# Patient Record
Sex: FEMALE | Race: WHITE | NOT HISPANIC OR LATINO | Employment: OTHER | URBAN - METROPOLITAN AREA
[De-identification: names, ages, dates, MRNs, and addresses within clinical notes are randomized per-mention and may not be internally consistent; named-entity substitution may affect disease eponyms.]

---

## 2017-01-04 ENCOUNTER — OFFICE VISIT - HEALTHEAST (OUTPATIENT)
Dept: INTERNAL MEDICINE | Facility: CLINIC | Age: 62
End: 2017-01-04

## 2017-01-04 DIAGNOSIS — J01.90 ACUTE SINUS INFECTION: ICD-10-CM

## 2017-01-04 ASSESSMENT — MIFFLIN-ST. JEOR: SCORE: 1388.23

## 2017-01-09 ENCOUNTER — COMMUNICATION - HEALTHEAST (OUTPATIENT)
Dept: INTERNAL MEDICINE | Facility: CLINIC | Age: 62
End: 2017-01-09

## 2017-01-16 ENCOUNTER — COMMUNICATION - HEALTHEAST (OUTPATIENT)
Dept: INTERNAL MEDICINE | Facility: CLINIC | Age: 62
End: 2017-01-16

## 2017-01-29 ENCOUNTER — RECORDS - HEALTHEAST (OUTPATIENT)
Dept: ADMINISTRATIVE | Facility: OTHER | Age: 62
End: 2017-01-29

## 2017-02-07 ENCOUNTER — COMMUNICATION - HEALTHEAST (OUTPATIENT)
Dept: SCHEDULING | Facility: CLINIC | Age: 62
End: 2017-02-07

## 2017-02-16 ENCOUNTER — OFFICE VISIT - HEALTHEAST (OUTPATIENT)
Dept: INTERNAL MEDICINE | Facility: CLINIC | Age: 62
End: 2017-02-16

## 2017-02-16 DIAGNOSIS — Z01.818 PRE-OP EXAM: ICD-10-CM

## 2017-02-16 DIAGNOSIS — N81.6 RECTOCELE: ICD-10-CM

## 2017-02-16 DIAGNOSIS — I10 ESSENTIAL HYPERTENSION: ICD-10-CM

## 2017-02-16 DIAGNOSIS — G47.33 OSA (OBSTRUCTIVE SLEEP APNEA): ICD-10-CM

## 2017-02-16 ASSESSMENT — MIFFLIN-ST. JEOR: SCORE: 1383.69

## 2017-02-17 ASSESSMENT — MIFFLIN-ST. JEOR: SCORE: 1379.16

## 2017-02-20 ENCOUNTER — ANESTHESIA - HEALTHEAST (OUTPATIENT)
Dept: SURGERY | Facility: CLINIC | Age: 62
End: 2017-02-20

## 2017-02-21 ENCOUNTER — SURGERY - HEALTHEAST (OUTPATIENT)
Dept: SURGERY | Facility: CLINIC | Age: 62
End: 2017-02-21

## 2017-02-22 ASSESSMENT — MIFFLIN-ST. JEOR: SCORE: 1405.92

## 2017-03-08 ENCOUNTER — COMMUNICATION - HEALTHEAST (OUTPATIENT)
Dept: INTERNAL MEDICINE | Facility: CLINIC | Age: 62
End: 2017-03-08

## 2017-09-24 ENCOUNTER — COMMUNICATION - HEALTHEAST (OUTPATIENT)
Dept: INTERNAL MEDICINE | Facility: CLINIC | Age: 62
End: 2017-09-24

## 2017-10-26 ENCOUNTER — OFFICE VISIT - HEALTHEAST (OUTPATIENT)
Dept: INTERNAL MEDICINE | Facility: CLINIC | Age: 62
End: 2017-10-26

## 2017-10-26 DIAGNOSIS — R50.9 FEVER AND CHILLS: ICD-10-CM

## 2017-10-26 DIAGNOSIS — H92.02 LEFT EAR PAIN: ICD-10-CM

## 2017-10-26 DIAGNOSIS — M54.2 NECK PAIN ON LEFT SIDE: ICD-10-CM

## 2017-10-26 ASSESSMENT — MIFFLIN-ST. JEOR: SCORE: 1342.87

## 2017-12-13 ENCOUNTER — COMMUNICATION - HEALTHEAST (OUTPATIENT)
Dept: INTERNAL MEDICINE | Facility: CLINIC | Age: 62
End: 2017-12-13

## 2018-01-18 ENCOUNTER — COMMUNICATION - HEALTHEAST (OUTPATIENT)
Dept: INTERNAL MEDICINE | Facility: CLINIC | Age: 63
End: 2018-01-18

## 2018-01-23 ENCOUNTER — RECORDS - HEALTHEAST (OUTPATIENT)
Dept: ADMINISTRATIVE | Facility: OTHER | Age: 63
End: 2018-01-23

## 2018-02-07 ENCOUNTER — COMMUNICATION - HEALTHEAST (OUTPATIENT)
Dept: INTERNAL MEDICINE | Facility: CLINIC | Age: 63
End: 2018-02-07

## 2018-02-08 ENCOUNTER — OFFICE VISIT - HEALTHEAST (OUTPATIENT)
Dept: INTERNAL MEDICINE | Facility: CLINIC | Age: 63
End: 2018-02-08

## 2018-02-08 DIAGNOSIS — Z11.59 ENCOUNTER FOR HEPATITIS C SCREENING TEST FOR LOW RISK PATIENT: ICD-10-CM

## 2018-02-08 DIAGNOSIS — Z00.00 ROUTINE GENERAL MEDICAL EXAMINATION AT A HEALTH CARE FACILITY: ICD-10-CM

## 2018-02-08 DIAGNOSIS — R05.9 COUGH WITH FEVER: ICD-10-CM

## 2018-02-08 DIAGNOSIS — M19.90 OSTEOARTHRITIS: ICD-10-CM

## 2018-02-08 DIAGNOSIS — I10 ESSENTIAL HYPERTENSION: ICD-10-CM

## 2018-02-08 DIAGNOSIS — R50.9 COUGH WITH FEVER: ICD-10-CM

## 2018-02-08 DIAGNOSIS — E03.9 HYPOTHYROIDISM: ICD-10-CM

## 2018-02-08 DIAGNOSIS — E78.5 HYPERLIPIDEMIA: ICD-10-CM

## 2018-02-08 DIAGNOSIS — S83.207A ACUTE TORN MENISCUS OF KNEE, LEFT, INITIAL ENCOUNTER: ICD-10-CM

## 2018-02-08 LAB
ALBUMIN SERPL-MCNC: 3.6 G/DL (ref 3.5–5)
ALBUMIN UR-MCNC: NEGATIVE MG/DL
ALP SERPL-CCNC: 109 U/L (ref 45–120)
ALT SERPL W P-5'-P-CCNC: 18 U/L (ref 0–45)
ANION GAP SERPL CALCULATED.3IONS-SCNC: 9 MMOL/L (ref 5–18)
APPEARANCE UR: CLEAR
AST SERPL W P-5'-P-CCNC: 18 U/L (ref 0–40)
BACTERIA #/AREA URNS HPF: ABNORMAL HPF
BILIRUB DIRECT SERPL-MCNC: 0.3 MG/DL
BILIRUB SERPL-MCNC: 0.8 MG/DL (ref 0–1)
BILIRUB UR QL STRIP: NEGATIVE
BUN SERPL-MCNC: 18 MG/DL (ref 8–22)
CALCIUM SERPL-MCNC: 9.7 MG/DL (ref 8.5–10.5)
CHLORIDE BLD-SCNC: 107 MMOL/L (ref 98–107)
CHOLEST SERPL-MCNC: 170 MG/DL
CO2 SERPL-SCNC: 25 MMOL/L (ref 22–31)
COLOR UR AUTO: YELLOW
CREAT SERPL-MCNC: 0.91 MG/DL (ref 0.6–1.1)
FASTING STATUS PATIENT QL REPORTED: YES
FLUAV AG SPEC QL IA: NORMAL
FLUBV AG SPEC QL IA: NORMAL
GFR SERPL CREATININE-BSD FRML MDRD: >60 ML/MIN/1.73M2
GLUCOSE BLD-MCNC: 102 MG/DL (ref 70–125)
GLUCOSE UR STRIP-MCNC: NEGATIVE MG/DL
HDLC SERPL-MCNC: 58 MG/DL
HGB BLD-MCNC: 13.5 G/DL (ref 12–16)
HGB UR QL STRIP: ABNORMAL
KETONES UR STRIP-MCNC: NEGATIVE MG/DL
LDLC SERPL CALC-MCNC: 89 MG/DL
LEUKOCYTE ESTERASE UR QL STRIP: NEGATIVE
NITRATE UR QL: NEGATIVE
PH UR STRIP: 6.5 [PH] (ref 5–8)
POTASSIUM BLD-SCNC: 4.6 MMOL/L (ref 3.5–5)
PROT SERPL-MCNC: 7.2 G/DL (ref 6–8)
RBC #/AREA URNS AUTO: ABNORMAL HPF
SODIUM SERPL-SCNC: 141 MMOL/L (ref 136–145)
SP GR UR STRIP: 1.02 (ref 1–1.03)
SQUAMOUS #/AREA URNS AUTO: ABNORMAL LPF
TRIGL SERPL-MCNC: 113 MG/DL
TSH SERPL DL<=0.005 MIU/L-ACNC: 0.21 UIU/ML (ref 0.3–5)
UROBILINOGEN UR STRIP-ACNC: ABNORMAL
WBC #/AREA URNS AUTO: ABNORMAL HPF

## 2018-02-08 ASSESSMENT — MIFFLIN-ST. JEOR: SCORE: 1347.4

## 2018-02-09 LAB — HCV AB SERPL QL IA: NEGATIVE

## 2018-02-13 ENCOUNTER — COMMUNICATION - HEALTHEAST (OUTPATIENT)
Dept: INTERNAL MEDICINE | Facility: CLINIC | Age: 63
End: 2018-02-13

## 2018-04-02 ENCOUNTER — HOSPITAL ENCOUNTER (OUTPATIENT)
Dept: MAMMOGRAPHY | Facility: CLINIC | Age: 63
Discharge: HOME | End: 2018-04-02
Attending: INTERNAL MEDICINE

## 2018-04-02 DIAGNOSIS — Z12.31 VISIT FOR SCREENING MAMMOGRAM: ICD-10-CM

## 2018-04-04 ENCOUNTER — COMMUNICATION - HEALTHEAST (OUTPATIENT)
Dept: INTERNAL MEDICINE | Facility: CLINIC | Age: 63
End: 2018-04-04

## 2018-04-04 DIAGNOSIS — I10 ESSENTIAL HYPERTENSION: ICD-10-CM

## 2018-10-04 ENCOUNTER — COMMUNICATION - HEALTHEAST (OUTPATIENT)
Dept: INTERNAL MEDICINE | Facility: CLINIC | Age: 63
End: 2018-10-04

## 2018-10-04 DIAGNOSIS — E03.9 HYPOTHYROIDISM: ICD-10-CM

## 2018-12-17 ENCOUNTER — RECORDS - HEALTHEAST (OUTPATIENT)
Dept: ADMINISTRATIVE | Facility: OTHER | Age: 63
End: 2018-12-17

## 2019-01-05 ENCOUNTER — COMMUNICATION - HEALTHEAST (OUTPATIENT)
Dept: INTERNAL MEDICINE | Facility: CLINIC | Age: 64
End: 2019-01-05

## 2019-01-05 DIAGNOSIS — E03.9 HYPOTHYROIDISM: ICD-10-CM

## 2019-01-06 ENCOUNTER — COMMUNICATION - HEALTHEAST (OUTPATIENT)
Dept: INTERNAL MEDICINE | Facility: CLINIC | Age: 64
End: 2019-01-06

## 2019-01-30 ENCOUNTER — COMMUNICATION - HEALTHEAST (OUTPATIENT)
Dept: INTERNAL MEDICINE | Facility: CLINIC | Age: 64
End: 2019-01-30

## 2019-01-30 DIAGNOSIS — E78.5 HYPERLIPIDEMIA LDL GOAL <100: ICD-10-CM

## 2019-03-01 ENCOUNTER — RECORDS - HEALTHEAST (OUTPATIENT)
Dept: ADMINISTRATIVE | Facility: OTHER | Age: 64
End: 2019-03-01

## 2019-04-03 ENCOUNTER — COMMUNICATION - HEALTHEAST (OUTPATIENT)
Dept: INTERNAL MEDICINE | Facility: CLINIC | Age: 64
End: 2019-04-03

## 2019-04-03 DIAGNOSIS — E03.9 HYPOTHYROIDISM: ICD-10-CM

## 2019-04-07 ENCOUNTER — COMMUNICATION - HEALTHEAST (OUTPATIENT)
Dept: INTERNAL MEDICINE | Facility: CLINIC | Age: 64
End: 2019-04-07

## 2019-04-07 DIAGNOSIS — I10 ESSENTIAL HYPERTENSION: ICD-10-CM

## 2019-04-08 ENCOUNTER — COMMUNICATION - HEALTHEAST (OUTPATIENT)
Dept: INTERNAL MEDICINE | Facility: CLINIC | Age: 64
End: 2019-04-08

## 2019-04-08 DIAGNOSIS — F32.A DEPRESSION, UNSPECIFIED DEPRESSION TYPE: ICD-10-CM

## 2019-05-08 ENCOUNTER — COMMUNICATION - HEALTHEAST (OUTPATIENT)
Dept: INTERNAL MEDICINE | Facility: CLINIC | Age: 64
End: 2019-05-08

## 2019-05-08 DIAGNOSIS — F32.A DEPRESSION, UNSPECIFIED DEPRESSION TYPE: ICD-10-CM

## 2019-05-14 ENCOUNTER — OFFICE VISIT - HEALTHEAST (OUTPATIENT)
Dept: INTERNAL MEDICINE | Facility: CLINIC | Age: 64
End: 2019-05-14

## 2019-05-14 DIAGNOSIS — Z01.818 ENCOUNTER FOR PREOPERATIVE EXAMINATION FOR GENERAL SURGICAL PROCEDURE: ICD-10-CM

## 2019-05-14 DIAGNOSIS — S83.207S ACUTE TORN MENISCUS OF KNEE, LEFT, SEQUELA: ICD-10-CM

## 2019-05-14 DIAGNOSIS — I10 ESSENTIAL HYPERTENSION: ICD-10-CM

## 2019-05-14 DIAGNOSIS — Z51.81 MEDICATION MONITORING ENCOUNTER: ICD-10-CM

## 2019-05-14 DIAGNOSIS — E03.9 HYPOTHYROIDISM, UNSPECIFIED TYPE: ICD-10-CM

## 2019-05-14 DIAGNOSIS — E78.5 HYPERLIPIDEMIA, UNSPECIFIED HYPERLIPIDEMIA TYPE: ICD-10-CM

## 2019-05-14 DIAGNOSIS — G47.33 OSA (OBSTRUCTIVE SLEEP APNEA): ICD-10-CM

## 2019-05-14 LAB
ALBUMIN SERPL-MCNC: 4 G/DL (ref 3.5–5)
ALP SERPL-CCNC: 112 U/L (ref 45–120)
ALT SERPL W P-5'-P-CCNC: 19 U/L (ref 0–45)
ANION GAP SERPL CALCULATED.3IONS-SCNC: 12 MMOL/L (ref 5–18)
AST SERPL W P-5'-P-CCNC: 18 U/L (ref 0–40)
ATRIAL RATE - MUSE: 66 BPM
BILIRUB DIRECT SERPL-MCNC: 0.2 MG/DL
BILIRUB SERPL-MCNC: 0.5 MG/DL (ref 0–1)
BUN SERPL-MCNC: 17 MG/DL (ref 8–22)
CALCIUM SERPL-MCNC: 10.3 MG/DL (ref 8.5–10.5)
CHLORIDE BLD-SCNC: 104 MMOL/L (ref 98–107)
CO2 SERPL-SCNC: 24 MMOL/L (ref 22–31)
CREAT SERPL-MCNC: 1.1 MG/DL (ref 0.6–1.1)
DIASTOLIC BLOOD PRESSURE - MUSE: NORMAL MMHG
ERYTHROCYTE [DISTWIDTH] IN BLOOD BY AUTOMATED COUNT: 11.5 % (ref 11–14.5)
GFR SERPL CREATININE-BSD FRML MDRD: 50 ML/MIN/1.73M2
GLUCOSE BLD-MCNC: 90 MG/DL (ref 70–125)
HCT VFR BLD AUTO: 43.1 % (ref 35–47)
HGB BLD-MCNC: 14.4 G/DL (ref 12–16)
INTERPRETATION ECG - MUSE: NORMAL
MCH RBC QN AUTO: 30.1 PG (ref 27–34)
MCHC RBC AUTO-ENTMCNC: 33.5 G/DL (ref 32–36)
MCV RBC AUTO: 90 FL (ref 80–100)
P AXIS - MUSE: 18 DEGREES
PLATELET # BLD AUTO: 276 THOU/UL (ref 140–440)
PMV BLD AUTO: 8.1 FL (ref 7–10)
POTASSIUM BLD-SCNC: 4.5 MMOL/L (ref 3.5–5)
PR INTERVAL - MUSE: 156 MS
PROT SERPL-MCNC: 7.1 G/DL (ref 6–8)
QRS DURATION - MUSE: 74 MS
QT - MUSE: 404 MS
QTC - MUSE: 423 MS
R AXIS - MUSE: -2 DEGREES
RBC # BLD AUTO: 4.79 MILL/UL (ref 3.8–5.4)
SODIUM SERPL-SCNC: 140 MMOL/L (ref 136–145)
SYSTOLIC BLOOD PRESSURE - MUSE: NORMAL MMHG
T AXIS - MUSE: 39 DEGREES
TSH SERPL DL<=0.005 MIU/L-ACNC: 0.24 UIU/ML (ref 0.3–5)
VENTRICULAR RATE- MUSE: 66 BPM
WBC: 7.3 THOU/UL (ref 4–11)

## 2019-05-14 ASSESSMENT — MIFFLIN-ST. JEOR: SCORE: 1410.91

## 2019-05-22 ENCOUNTER — COMMUNICATION - HEALTHEAST (OUTPATIENT)
Dept: INTERNAL MEDICINE | Facility: CLINIC | Age: 64
End: 2019-05-22

## 2019-05-22 DIAGNOSIS — E78.5 HYPERLIPIDEMIA LDL GOAL <100: ICD-10-CM

## 2019-07-07 ENCOUNTER — COMMUNICATION - HEALTHEAST (OUTPATIENT)
Dept: INTERNAL MEDICINE | Facility: CLINIC | Age: 64
End: 2019-07-07

## 2019-07-07 DIAGNOSIS — E03.9 HYPOTHYROIDISM: ICD-10-CM

## 2019-08-12 ENCOUNTER — COMMUNICATION - HEALTHEAST (OUTPATIENT)
Dept: INTERNAL MEDICINE | Facility: CLINIC | Age: 64
End: 2019-08-12

## 2019-08-12 DIAGNOSIS — F32.A DEPRESSION, UNSPECIFIED DEPRESSION TYPE: ICD-10-CM

## 2019-08-27 ENCOUNTER — OFFICE VISIT - HEALTHEAST (OUTPATIENT)
Dept: INTERNAL MEDICINE | Facility: CLINIC | Age: 64
End: 2019-08-27

## 2019-08-27 DIAGNOSIS — I10 ESSENTIAL HYPERTENSION: ICD-10-CM

## 2019-08-27 DIAGNOSIS — Z51.81 MEDICATION MONITORING ENCOUNTER: ICD-10-CM

## 2019-08-27 DIAGNOSIS — E03.9 HYPOTHYROIDISM, UNSPECIFIED TYPE: ICD-10-CM

## 2019-08-27 DIAGNOSIS — Z86.0100 HISTORY OF COLON POLYPS: ICD-10-CM

## 2019-08-27 DIAGNOSIS — Z00.00 ROUTINE GENERAL MEDICAL EXAMINATION AT A HEALTH CARE FACILITY: ICD-10-CM

## 2019-08-27 DIAGNOSIS — M15.0 PRIMARY OSTEOARTHRITIS INVOLVING MULTIPLE JOINTS: ICD-10-CM

## 2019-08-27 DIAGNOSIS — Z11.4 SCREENING FOR HIV (HUMAN IMMUNODEFICIENCY VIRUS): ICD-10-CM

## 2019-08-27 DIAGNOSIS — G47.33 OSA (OBSTRUCTIVE SLEEP APNEA): ICD-10-CM

## 2019-08-27 DIAGNOSIS — E78.5 HYPERLIPIDEMIA, UNSPECIFIED HYPERLIPIDEMIA TYPE: ICD-10-CM

## 2019-08-27 DIAGNOSIS — R13.19 ESOPHAGEAL DYSPHAGIA: ICD-10-CM

## 2019-08-27 LAB
ALBUMIN SERPL-MCNC: 3.9 G/DL (ref 3.5–5)
ALBUMIN UR-MCNC: NEGATIVE MG/DL
ALP SERPL-CCNC: 126 U/L (ref 45–120)
ALT SERPL W P-5'-P-CCNC: 20 U/L (ref 0–45)
ANION GAP SERPL CALCULATED.3IONS-SCNC: 12 MMOL/L (ref 5–18)
APPEARANCE UR: CLEAR
AST SERPL W P-5'-P-CCNC: 19 U/L (ref 0–40)
BILIRUB DIRECT SERPL-MCNC: 0.2 MG/DL
BILIRUB SERPL-MCNC: 0.5 MG/DL (ref 0–1)
BILIRUB UR QL STRIP: NEGATIVE
BUN SERPL-MCNC: 15 MG/DL (ref 8–22)
CALCIUM SERPL-MCNC: 9.5 MG/DL (ref 8.5–10.5)
CHLORIDE BLD-SCNC: 106 MMOL/L (ref 98–107)
CHOLEST SERPL-MCNC: 182 MG/DL
CO2 SERPL-SCNC: 22 MMOL/L (ref 22–31)
COLOR UR AUTO: YELLOW
CREAT SERPL-MCNC: 1.07 MG/DL (ref 0.6–1.1)
ERYTHROCYTE [DISTWIDTH] IN BLOOD BY AUTOMATED COUNT: 11.8 % (ref 11–14.5)
FASTING STATUS PATIENT QL REPORTED: YES
GFR SERPL CREATININE-BSD FRML MDRD: 52 ML/MIN/1.73M2
GLUCOSE BLD-MCNC: 91 MG/DL (ref 70–125)
GLUCOSE UR STRIP-MCNC: NEGATIVE MG/DL
HCT VFR BLD AUTO: 39.9 % (ref 35–47)
HDLC SERPL-MCNC: 57 MG/DL
HGB BLD-MCNC: 13.4 G/DL (ref 12–16)
HGB UR QL STRIP: NEGATIVE
HIV 1+2 AB+HIV1 P24 AG SERPL QL IA: NEGATIVE
KETONES UR STRIP-MCNC: NEGATIVE MG/DL
LDLC SERPL CALC-MCNC: 104 MG/DL
LEUKOCYTE ESTERASE UR QL STRIP: NEGATIVE
MCH RBC QN AUTO: 30.3 PG (ref 27–34)
MCHC RBC AUTO-ENTMCNC: 33.7 G/DL (ref 32–36)
MCV RBC AUTO: 90 FL (ref 80–100)
NITRATE UR QL: NEGATIVE
PH UR STRIP: 5.5 [PH] (ref 4.5–8)
PLATELET # BLD AUTO: 241 THOU/UL (ref 140–440)
PMV BLD AUTO: 8.3 FL (ref 7–10)
POTASSIUM BLD-SCNC: 4.3 MMOL/L (ref 3.5–5)
PROT SERPL-MCNC: 6.6 G/DL (ref 6–8)
RBC # BLD AUTO: 4.44 MILL/UL (ref 3.8–5.4)
SODIUM SERPL-SCNC: 140 MMOL/L (ref 136–145)
SP GR UR STRIP: 1.01 (ref 1–1.03)
TRIGL SERPL-MCNC: 107 MG/DL
TSH SERPL DL<=0.005 MIU/L-ACNC: 3.03 UIU/ML (ref 0.3–5)
UROBILINOGEN UR STRIP-ACNC: NORMAL
WBC: 5.3 THOU/UL (ref 4–11)

## 2019-08-27 ASSESSMENT — MIFFLIN-ST. JEOR: SCORE: 1429.05

## 2019-09-10 ENCOUNTER — RECORDS - HEALTHEAST (OUTPATIENT)
Dept: ADMINISTRATIVE | Facility: OTHER | Age: 64
End: 2019-09-10

## 2019-10-17 ENCOUNTER — RECORDS - HEALTHEAST (OUTPATIENT)
Dept: ADMINISTRATIVE | Facility: OTHER | Age: 64
End: 2019-10-17

## 2019-10-28 ENCOUNTER — COMMUNICATION - HEALTHEAST (OUTPATIENT)
Dept: INTERNAL MEDICINE | Facility: CLINIC | Age: 64
End: 2019-10-28

## 2019-10-29 ENCOUNTER — COMMUNICATION - HEALTHEAST (OUTPATIENT)
Dept: INTERNAL MEDICINE | Facility: CLINIC | Age: 64
End: 2019-10-29

## 2019-10-29 ENCOUNTER — OFFICE VISIT - HEALTHEAST (OUTPATIENT)
Dept: INTERNAL MEDICINE | Facility: CLINIC | Age: 64
End: 2019-10-29

## 2019-10-29 DIAGNOSIS — R42 DIZZINESS: ICD-10-CM

## 2019-10-29 DIAGNOSIS — R68.89 FULLNESS IN HEAD: ICD-10-CM

## 2019-10-29 ASSESSMENT — MIFFLIN-ST. JEOR: SCORE: 1442.66

## 2019-11-01 ENCOUNTER — HOSPITAL ENCOUNTER (OUTPATIENT)
Dept: MRI IMAGING | Facility: CLINIC | Age: 64
Discharge: HOME | End: 2019-11-01

## 2019-11-01 DIAGNOSIS — R42 DIZZINESS: ICD-10-CM

## 2019-11-01 LAB
CREAT BLD-MCNC: 1 MG/DL (ref 0.6–1.1)
GFR SERPL CREATININE-BSD FRML MDRD: 56 ML/MIN/1.73M2

## 2019-11-25 ENCOUNTER — HOSPITAL ENCOUNTER (OUTPATIENT)
Dept: MAMMOGRAPHY | Facility: CLINIC | Age: 64
Discharge: HOME | End: 2019-11-25
Attending: OBSTETRICS & GYNECOLOGY

## 2019-11-25 DIAGNOSIS — Z12.31 VISIT FOR SCREENING MAMMOGRAM: ICD-10-CM

## 2020-01-06 ENCOUNTER — OFFICE VISIT - HEALTHEAST (OUTPATIENT)
Dept: INTERNAL MEDICINE | Facility: CLINIC | Age: 65
End: 2020-01-06

## 2020-01-06 DIAGNOSIS — I10 ESSENTIAL HYPERTENSION: ICD-10-CM

## 2020-01-06 DIAGNOSIS — M17.12 PRIMARY OSTEOARTHRITIS OF LEFT KNEE: ICD-10-CM

## 2020-01-06 DIAGNOSIS — E03.9 HYPOTHYROIDISM, UNSPECIFIED TYPE: ICD-10-CM

## 2020-01-06 DIAGNOSIS — Z01.818 ENCOUNTER FOR PREOPERATIVE EXAMINATION FOR GENERAL SURGICAL PROCEDURE: ICD-10-CM

## 2020-01-06 DIAGNOSIS — E78.5 HYPERLIPIDEMIA, UNSPECIFIED HYPERLIPIDEMIA TYPE: ICD-10-CM

## 2020-01-06 DIAGNOSIS — K21.00 GERD WITH ESOPHAGITIS: ICD-10-CM

## 2020-01-06 DIAGNOSIS — G47.33 OSA (OBSTRUCTIVE SLEEP APNEA): ICD-10-CM

## 2020-01-06 LAB
ALBUMIN UR-MCNC: NEGATIVE MG/DL
ANION GAP SERPL CALCULATED.3IONS-SCNC: 9 MMOL/L (ref 5–18)
APPEARANCE UR: CLEAR
ATRIAL RATE - MUSE: 66 BPM
BILIRUB UR QL STRIP: NEGATIVE
BUN SERPL-MCNC: 12 MG/DL (ref 8–22)
CALCIUM SERPL-MCNC: 9.6 MG/DL (ref 8.5–10.5)
CHLORIDE BLD-SCNC: 107 MMOL/L (ref 98–107)
CO2 SERPL-SCNC: 24 MMOL/L (ref 22–31)
COLOR UR AUTO: YELLOW
CREAT SERPL-MCNC: 0.97 MG/DL (ref 0.6–1.1)
DIASTOLIC BLOOD PRESSURE - MUSE: NORMAL
ERYTHROCYTE [DISTWIDTH] IN BLOOD BY AUTOMATED COUNT: 11.8 % (ref 11–14.5)
GFR SERPL CREATININE-BSD FRML MDRD: 58 ML/MIN/1.73M2
GLUCOSE BLD-MCNC: 103 MG/DL (ref 70–125)
GLUCOSE UR STRIP-MCNC: NEGATIVE MG/DL
HCT VFR BLD AUTO: 42 % (ref 35–47)
HGB BLD-MCNC: 14.2 G/DL (ref 12–16)
HGB UR QL STRIP: NEGATIVE
INTERPRETATION ECG - MUSE: NORMAL
KETONES UR STRIP-MCNC: NEGATIVE MG/DL
LEUKOCYTE ESTERASE UR QL STRIP: NEGATIVE
MCH RBC QN AUTO: 29.7 PG (ref 27–34)
MCHC RBC AUTO-ENTMCNC: 33.8 G/DL (ref 32–36)
MCV RBC AUTO: 88 FL (ref 80–100)
NITRATE UR QL: NEGATIVE
P AXIS - MUSE: 23 DEGREES
PH UR STRIP: 6 [PH] (ref 5–8)
PLATELET # BLD AUTO: 224 THOU/UL (ref 140–440)
PMV BLD AUTO: 8.3 FL (ref 7–10)
POTASSIUM BLD-SCNC: 4.6 MMOL/L (ref 3.5–5)
PR INTERVAL - MUSE: 158 MS
QRS DURATION - MUSE: 72 MS
QT - MUSE: 402 MS
QTC - MUSE: 421 MS
R AXIS - MUSE: -4 DEGREES
RBC # BLD AUTO: 4.79 MILL/UL (ref 3.8–5.4)
SODIUM SERPL-SCNC: 140 MMOL/L (ref 136–145)
SP GR UR STRIP: <=1.005 (ref 1–1.03)
SYSTOLIC BLOOD PRESSURE - MUSE: NORMAL
T AXIS - MUSE: 32 DEGREES
UROBILINOGEN UR STRIP-ACNC: NORMAL
VENTRICULAR RATE- MUSE: 66 BPM
WBC: 5.4 THOU/UL (ref 4–11)

## 2020-01-06 ASSESSMENT — PATIENT HEALTH QUESTIONNAIRE - PHQ9: SUM OF ALL RESPONSES TO PHQ QUESTIONS 1-9: 0

## 2020-01-06 ASSESSMENT — MIFFLIN-ST. JEOR: SCORE: 1438.12

## 2020-03-31 ENCOUNTER — COMMUNICATION - HEALTHEAST (OUTPATIENT)
Dept: INTERNAL MEDICINE | Facility: CLINIC | Age: 65
End: 2020-03-31

## 2020-03-31 DIAGNOSIS — F32.A DEPRESSION, UNSPECIFIED DEPRESSION TYPE: ICD-10-CM

## 2020-04-10 ENCOUNTER — COMMUNICATION - HEALTHEAST (OUTPATIENT)
Dept: INTERNAL MEDICINE | Facility: CLINIC | Age: 65
End: 2020-04-10

## 2020-04-10 DIAGNOSIS — I10 ESSENTIAL HYPERTENSION: ICD-10-CM

## 2020-05-29 ENCOUNTER — COMMUNICATION - HEALTHEAST (OUTPATIENT)
Dept: INTERNAL MEDICINE | Facility: CLINIC | Age: 65
End: 2020-05-29

## 2020-05-29 DIAGNOSIS — E78.5 HYPERLIPIDEMIA LDL GOAL <100: ICD-10-CM

## 2020-06-22 ENCOUNTER — COMMUNICATION - HEALTHEAST (OUTPATIENT)
Dept: INTERNAL MEDICINE | Facility: CLINIC | Age: 65
End: 2020-06-22

## 2020-06-22 DIAGNOSIS — F32.A DEPRESSION, UNSPECIFIED DEPRESSION TYPE: ICD-10-CM

## 2020-07-02 ENCOUNTER — COMMUNICATION - HEALTHEAST (OUTPATIENT)
Dept: INTERNAL MEDICINE | Facility: CLINIC | Age: 65
End: 2020-07-02

## 2020-07-02 DIAGNOSIS — E03.9 HYPOTHYROIDISM: ICD-10-CM

## 2020-08-11 ENCOUNTER — OFFICE VISIT - HEALTHEAST (OUTPATIENT)
Dept: INTERNAL MEDICINE | Facility: CLINIC | Age: 65
End: 2020-08-11

## 2020-08-11 DIAGNOSIS — R13.19 ESOPHAGEAL DYSPHAGIA: ICD-10-CM

## 2020-08-11 DIAGNOSIS — R19.7 DIARRHEA, UNSPECIFIED TYPE: ICD-10-CM

## 2020-08-11 DIAGNOSIS — R10.84 ABDOMINAL PAIN, GENERALIZED: ICD-10-CM

## 2020-08-11 LAB
ALBUMIN SERPL-MCNC: 3.8 G/DL (ref 3.5–5)
ALBUMIN UR-MCNC: NEGATIVE MG/DL
ALP SERPL-CCNC: 143 U/L (ref 45–120)
ALT SERPL W P-5'-P-CCNC: 18 U/L (ref 0–45)
ANION GAP SERPL CALCULATED.3IONS-SCNC: 12 MMOL/L (ref 5–18)
APPEARANCE UR: CLEAR
AST SERPL W P-5'-P-CCNC: 14 U/L (ref 0–40)
BACTERIA #/AREA URNS HPF: ABNORMAL HPF
BILIRUB SERPL-MCNC: 0.5 MG/DL (ref 0–1)
BILIRUB UR QL STRIP: NEGATIVE
BUN SERPL-MCNC: 21 MG/DL (ref 8–22)
CALCIUM SERPL-MCNC: 9.7 MG/DL (ref 8.5–10.5)
CHLORIDE BLD-SCNC: 105 MMOL/L (ref 98–107)
CO2 SERPL-SCNC: 24 MMOL/L (ref 22–31)
COLOR UR AUTO: YELLOW
CREAT SERPL-MCNC: 1.21 MG/DL (ref 0.6–1.1)
ERYTHROCYTE [DISTWIDTH] IN BLOOD BY AUTOMATED COUNT: 11.7 % (ref 11–14.5)
GFR SERPL CREATININE-BSD FRML MDRD: 45 ML/MIN/1.73M2
GLUCOSE BLD-MCNC: 93 MG/DL (ref 70–125)
GLUCOSE UR STRIP-MCNC: NEGATIVE MG/DL
HCT VFR BLD AUTO: 42.8 % (ref 35–47)
HGB BLD-MCNC: 14.2 G/DL (ref 12–16)
HGB UR QL STRIP: ABNORMAL
KETONES UR STRIP-MCNC: NEGATIVE MG/DL
LEUKOCYTE ESTERASE UR QL STRIP: NEGATIVE
MCH RBC QN AUTO: 29.7 PG (ref 27–34)
MCHC RBC AUTO-ENTMCNC: 33.1 G/DL (ref 32–36)
MCV RBC AUTO: 90 FL (ref 80–100)
NITRATE UR QL: NEGATIVE
PH UR STRIP: 5 [PH] (ref 5–8)
PLATELET # BLD AUTO: 234 THOU/UL (ref 140–440)
PMV BLD AUTO: 8.1 FL (ref 7–10)
POTASSIUM BLD-SCNC: 4.2 MMOL/L (ref 3.5–5)
PROT SERPL-MCNC: 7.3 G/DL (ref 6–8)
RBC # BLD AUTO: 4.77 MILL/UL (ref 3.8–5.4)
RBC #/AREA URNS AUTO: ABNORMAL HPF
SODIUM SERPL-SCNC: 141 MMOL/L (ref 136–145)
SP GR UR STRIP: 1.01 (ref 1–1.03)
SQUAMOUS #/AREA URNS AUTO: ABNORMAL LPF
TSH SERPL DL<=0.005 MIU/L-ACNC: 0.33 UIU/ML (ref 0.3–5)
UROBILINOGEN UR STRIP-ACNC: ABNORMAL
WBC #/AREA URNS AUTO: ABNORMAL HPF
WBC: 7.6 THOU/UL (ref 4–11)

## 2020-08-11 RX ORDER — OMEPRAZOLE 20 MG/1
20 TABLET, DELAYED RELEASE ORAL
Qty: 90 TABLET | Refills: 3 | Status: SHIPPED | OUTPATIENT
Start: 2020-08-11 | End: 2021-09-28

## 2020-08-11 ASSESSMENT — MIFFLIN-ST. JEOR: SCORE: 1401.84

## 2020-08-11 ASSESSMENT — PATIENT HEALTH QUESTIONNAIRE - PHQ9: SUM OF ALL RESPONSES TO PHQ QUESTIONS 1-9: 2

## 2020-08-12 ENCOUNTER — COMMUNICATION - HEALTHEAST (OUTPATIENT)
Dept: INTERNAL MEDICINE | Facility: CLINIC | Age: 65
End: 2020-08-12

## 2020-08-12 ENCOUNTER — COMMUNICATION - HEALTHEAST (OUTPATIENT)
Dept: LAB | Facility: CLINIC | Age: 65
End: 2020-08-12

## 2020-08-14 ENCOUNTER — AMBULATORY - HEALTHEAST (OUTPATIENT)
Dept: LAB | Facility: CLINIC | Age: 65
End: 2020-08-14

## 2020-08-14 DIAGNOSIS — R19.7 DIARRHEA, UNSPECIFIED TYPE: ICD-10-CM

## 2020-08-14 DIAGNOSIS — R10.84 ABDOMINAL PAIN, GENERALIZED: ICD-10-CM

## 2020-08-15 LAB
SHIGA TOXIN 1: NEGATIVE
SHIGA TOXIN 2: NEGATIVE

## 2020-08-17 LAB — BACTERIA SPEC CULT: NORMAL

## 2020-10-26 ENCOUNTER — VIRTUAL VISIT (OUTPATIENT)
Dept: FAMILY MEDICINE | Facility: OTHER | Age: 65
End: 2020-10-26

## 2020-10-26 NOTE — PROGRESS NOTES
"Date: 10/26/2020 11:07:32  Clinician: Adams Richard  Clinician NPI: [id number]  Patient: Kandi Kim  Patient : 1955  Patient Address: [address] [place] [address]  Patient Phone: [phone]  Visit Protocol: URI  Patient Summary:  Kandi is a 65 year old ( : 1955 ) female who initiated a [facility] Visit for COVID-19 (Coronavirus) evaluation and screening. When asked the question \"Please sign me up to receive news, health information and promotions from [facility].\", Kandi responded \"No\".    Kandi states her symptoms started 1-2 days ago.   Her symptoms consist of ear pain, a headache, a cough, nasal congestion, rhinitis, myalgia, malaise, a sore throat, and diarrhea.   Symptom details     Nasal secretions: The color of her mucus is clear.    Cough: Kandi coughs a few times an hour and her cough is not more bothersome at night. Phlegm does not come into her throat when she coughs. She believes her cough is caused by post-nasal drip.     Sore throat: Kandi reports having mild throat pain (1-3 on a 10 point pain scale), does not have exudate on her tonsils, and can swallow liquids. She is not sure if the lymph nodes in her neck are enlarged. A rash has not appeared on the skin since the sore throat started.     Headache: She states the headache is mild (1-3 on a 10 point pain scale).      Kandi denies having wheezing, fever, anosmia, vomiting, nausea, facial pain or pressure, chills, teeth pain, and ageusia. She also denies taking antibiotic medication in the past month and having recent facial or sinus surgery in the past 60 days. She is not experiencing dyspnea.   Precipitating events  Within the past week, Kandi has not been exposed to someone with strep throat. She has not recently been exposed to someone with influenza. Kandi has not been in close contact with any high risk individuals.   Pertinent COVID-19 (Coronavirus) information  In the past 14 days, Kandi has not worked in a " congregate living setting.   She does not work or volunteer as healthcare worker or a  and does not work or volunteer in a healthcare facility.   Kandi also has not lived in a congregate living setting in the past 14 days. She does not live with a healthcare worker.   Kandi has not had a close contact with a laboratory-confirmed COVID-19 patient within 14 days of symptom onset.   Since December 2019, Kandi and has not had upper respiratory infection or influenza-like illness. Has not been diagnosed with lab-confirmed COVID-19 test   Pertinent medical history  Kandi does not get yeast infections when she takes antibiotics.   Kandi does not need a return to work/school note.   Weight: 185 lbs   Kandi does not smoke or use smokeless tobacco.   Weight: 185 lbs    MEDICATIONS: omeprazole oral, levothyroxine oral, atorvastatin oral, citalopram oral, lisinopril oral, ALLERGIES: NKDA  Clinician Response:  Dear Kandi,   Your symptoms show that you may have coronavirus (COVID-19). This illness can cause fever, cough and trouble breathing. Many people get a mild case and get better on their own. Some people can get very sick.  What should I do?  We would like to test you for this virus.   1. Please call [phone] to schedule your visit. Explain that [facility]. Be ready to share your [facility] visit ID number.  Please note that if you are assessed for Covid-19 testing and receive an order for testing from [facility], that the scheduling of your Covid test at [facility] may be delayed by three or four days or more due to limited availability for testing. Additional options for testing can be found on the [place] Covid-19 Response website. https://[place].gov/covid19/    The following will serve as your written order for this COVID Test, ordered by me, for the indication of suspected COVID [Z20.828]: The test will be ordered in [employer] our electronic health record, after you are  "scheduled. It will show as ordered and authorized by Donald Dixon MD.  Order: COVID-19 (Coronavirus) PCR for [facility]MPTOMATIC [facility].   2. When it's time for your COVID test:  Stay at least 6 feet away from others. (If someone will drive you to your test, stay in the backseat, as far away from the  as you can.)   Cover your mouth and nose with a mask, tissue or washcloth.  Go straight to the testing site. Don't make any stops on the way there or back.      3.Starting now: Stay home and away from others (self-isolate) until:   You've had no fever---and no medicine that reduces fever---for one full day (24 hours). And...   Your other symptoms have gotten better. For example, your cough or breathing has improved. And...   At least 10 days have passed since your symptoms started.       During this time, don't leave the house except for testing or medical care.   Stay in your own room, even for meals. Use your own bathroom if you can.   Stay away from others in your home. No hugging, kissing or shaking hands. No visitors.  Don't go to work, school or anywhere else.    Clean \"high touch\" surfaces often (doorknobs, counters, handles, etc.). Use a household cleaning spray or wipes. You'll find a full list of  on the EPA website: www.epa.gov/pesticide-registration/list-n-disinfectants-use-against-sars-cov-2.   Cover your mouth and nose with a mask, tissue or washcloth to avoid spreading germs.  Wash your hands and face often. Use soap and water.  Caregivers in these groups are at risk for severe illness due to COVID-19:  o People 65 years and older  o People who live in a nursing home or long-term care facility  o People with chronic disease (lung, heart, cancer, diabetes, kidney, liver, immunologic)  o People who have a weakened immune system, including those who:   Are in cancer treatment  Take medicine that weakens the immune system, such as corticosteroids  Had a bone marrow or organ transplant  Have " an immune deficiency  Have poorly controlled HIV or AIDS  Are obese (body mass index of 40 or higher)  Smoke regularly   o Caregivers should wear gloves while washing dishes, handling laundry and cleaning bedrooms and bathrooms.  o Use caution when washing and drying laundry: Don't shake dirty laundry, and use the warmest water setting that you can.  o For more tips, go to www.cdc.gov/coronavirus/2019-ncov/downloads/10Things.pdf.    How can I take care of myself?    Get lots of rest. Drink extra fluids (unless a doctor has told you not to).   Take Tylenol (acetaminophen) for fever or pain. If you have liver or kidney problems, ask your family doctor if it's okay to take Tylenol.   Adults can take either:    650 mg (two 325 mg pills) every 4 to 6 hours, or...   1,000 mg (two 500 mg pills) every 8 hours as needed.    Note: Don't take more than 3,000 mg in one day. Acetaminophen is found in many medicines (both prescribed and over-the-counter medicines). Read all labels to be sure you don't take too much.   For children, check the Tylenol bottle for the right dose. The dose is based on the child's age or weight.    If you have other health problems (like cancer, heart failure, an organ transplant or severe kidney disease): Call your specialty clinic if you don't feel better in the next 2 days.       Know when to call 911. Emergency warning signs include:    Trouble breathing or shortness of breath Pain or pressure in the chest that doesn't go away Feeling confused like you haven't felt before, or not being able to wake up Bluish-colored lips or face.  Where can I get more information?    [facility] -- About COVID-19: [email or web address]   CDC -- What to Do If You're Sick: www.cdc.gov/coronavirus/2019-ncov/about/steps-when-sick.html   CDC -- Ending Home Isolation: www.cdc.gov/coronavirus/2019-ncov/hcp/disposition-in-home-patients.html   CDC -- Caring for Someone:  www.cdc.gov/coronavirus/2019-ncov/if-you-are-sick/care-for-someone.html   [facility] -- Interim Guidance for Hospital Discharge to Home: www.health.[facility].[place].us/diseases/coronavirus/hcp/hospdischarge.pdf   [facility] clinical trials (COVID-19 research studies): clinicalaffairs.[facility]-clinical-trials    Below are the COVID-19 hotlines at the [place] Department of Health ([facility]). Interpreters are available.    For health questions: Call 422-568-4455 or 1-517.912.3233 (7 a.m. to 7 p.m.) For questions about schools and childcare: Call 291-137-0971 or 1-214.288.8818 (7 a.m. to 7 p.m.)    Diagnosis: Contact with and (suspected) exposure to other viral communicable diseases  Diagnosis ICD: Z20.828

## 2020-11-04 ENCOUNTER — COMMUNICATION - HEALTHEAST (OUTPATIENT)
Dept: INTERNAL MEDICINE | Facility: CLINIC | Age: 65
End: 2020-11-04

## 2020-11-30 ENCOUNTER — VIRTUAL VISIT (OUTPATIENT)
Dept: FAMILY MEDICINE | Facility: OTHER | Age: 65
End: 2020-11-30
Payer: COMMERCIAL

## 2020-11-30 PROCEDURE — 99421 OL DIG E/M SVC 5-10 MIN: CPT | Performed by: PHYSICIAN ASSISTANT

## 2020-11-30 NOTE — PROGRESS NOTES
"Date: 2020 12:25:54  Clinician: Adams Richard  Clinician NPI: [id number]  Patient: Kandi Kim  Patient : 1955  Patient Address: [address] [place] [address]  Patient Phone: [phone]  Visit Protocol: URI  Patient Summary:  Kandi is a 65 year old ( : 1955 ) female who initiated a [facility] Visit for COVID-19 (Coronavirus) evaluation and screening. When asked the question \"Please sign me up to receive news, health information and promotions from [facility].\", Kandi responded \"Yes\".    Kandi states her symptoms started 1-2 days ago.   Her symptoms consist of a cough, nasal congestion, myalgia, chills, malaise, a sore throat, tooth pain, and diarrhea.   Symptom details     Nasal secretions: The color of her mucus is clear.    Cough: Kandi coughs a few times an hour and her cough is not more bothersome at night. Phlegm does not come into her throat when she coughs. She does not believe her cough is caused by post-nasal drip.     Sore throat: Kandi reports having mild throat pain (1-3 on a 10 point pain scale), does not have exudate on her tonsils, and can swallow liquids. The lymph nodes in her neck are not enlarged. A rash has not appeared on the skin since the sore throat started.     Tooth pain: The tooth pain is not caused by a cavity, recent dental work, or other mouth problems.      Kandi denies having ear pain, headache, wheezing, fever, enlarged lymph nodes, nausea, vomiting, rhinitis, facial pain or pressure, ageusia, and anosmia. She also denies taking antibiotic medication in the past month and having recent facial or sinus surgery in the past 60 days. She is not experiencing dyspnea.   Precipitating events  Within the past week, Kandi has not been exposed to someone with strep throat. She has not recently been exposed to someone with influenza. Kandi has been in close contact with the following high risk individuals: adults 65 or older.   Pertinent COVID-19 " (Coronavirus) information  Kandi does not work or volunteer as healthcare worker or a . In the past 14 days, Kandi has not worked or volunteered at a healthcare facility or group living setting.   In the past 14 days, she also has not lived in a congregate living setting.   Kandi has had a close contact with a laboratory-confirmed COVID-19 patient within 14 days of symptom onset. She was not exposed at her work. Date Kandi was exposed to the laboratory-confirmed COVID-19 patient: 11/25/2020   Additional information about contact with COVID-19 (Coronavirus) patient as reported by the patient (free text): My .    Since December 2019, Kandi has been tested for COVID-19 and has had upper respiratory infection (URI) or influenza-like illness.      Result of COVID-19 test: Negative     Date of her COVID-19 test: 10/15/2020     Date(s) of previous URI or influenza-like illness (free-text): 10/14 - 10/20     Symptoms Kandi experienced during previous URI or influenza-like illness as reported by the patient (free-text): Cough, headache,cold        Pertinent medical history  She has not been told by her provider to avoid NSAIDs.   Kandi does not get yeast infections when she takes antibiotics.   Kandi does not have diabetes. She denies having immunosuppressive conditions (e.g., chemotherapy, HIV, organ transplant, long-term use of steroids or other immunosuppressive medications, splenectomy). She does not have severe COPD and congestive heart failure. She does not have asthma.   Kandi does not need a return to work/school note.   Weight: 190 lbs   Kandi does not smoke or use smokeless tobacco.   Weight: 190 lbs    MEDICATIONS: omeprazole oral, levothyroxine oral, atorvastatin oral, citalopram oral, lisinopril oral, ALLERGIES: NKDA  Clinician Response:  Dear Kandi,   Your symptoms show that you may have coronavirus (COVID-19). This illness can cause fever, cough and trouble breathing. Many people get a mild case and  "get better on their own. Some people can get very sick.  What should I do?  We would like to test you for this virus.   1. Please call [phone] to schedule your visit. Explain that you were referred by [facility] to have a COVID-19 test. Be ready to share your [facility].  * If you need to schedule in [facility] please call [phone] or for [employer] employees please call [phone].  * If you need to schedule in the [place] area please call [phone]. [place] employees call [phone].  The following will serve as your written order for this COVID Test, ordered by me, for the indication of suspected COVID [Z20.828]: The test will be ordered in [employer], our electronic health record, after you are scheduled. It will show as ordered and authorized by Donald Dixon MD.  Order: COVID-19 (Coronavirus) PCR for SYMPTOMATIC testing from [facility].   2. When it's time for your COVID test:  Stay at least 6 feet away from others. (If someone will drive you to your test, stay in the backseat, as far away from the  as you can.)   Cover your mouth and nose with a mask, tissue or washcloth.  Go straight to the testing site. Don't make any stops on the way there or back.      3.Starting now: Stay home and away from others (self-isolate) until:   You've had no fever---and no medicine that reduces fever---for one full day (24 hours). And...   Your other symptoms have gotten better. For example, your cough or breathing has improved. And...   At least 10 days have passed since your symptoms started.       During this time, don't leave the house except for testing or medical care.   Stay in your own room, even for meals. Use your own bathroom if you can.   Stay away from others in your home. No hugging, kissing or shaking hands. No visitors.  Don't go to work, school or anywhere else.    Clean \"high touch\" surfaces often (doorknobs, counters, handles, etc.). Use a household cleaning spray or wipes. You'll find a " full list of  on the EPA website: www.epa.gov/pesticide-registration/list-n-disinfectants-use-against-sars-cov-2.   Cover your mouth and nose with a mask, tissue or washcloth to avoid spreading germs.  Wash your hands and face often. Use soap and water.  Caregivers in these groups are at risk for severe illness due to COVID-19:  o People 65 years and older  o People who live in a nursing home or long-term care facility  o People with chronic disease (lung, heart, cancer, diabetes, kidney, liver, immunologic)  o People who have a weakened immune system, including those who:   Are in cancer treatment  Take medicine that weakens the immune system, such as corticosteroids  Had a bone marrow or organ transplant  Have an immune deficiency  Have poorly controlled HIV or AIDS  Are obese (body mass index of 40 or higher)  Smoke regularly   o Caregivers should wear gloves while washing dishes, handling laundry and cleaning bedrooms and bathrooms.  o Use caution when washing and drying laundry: Don't shake dirty laundry, and use the warmest water setting that you can.  o For more tips, go to www.cdc.gov/coronavirus/2019-ncov/downloads/10Things.pdf.    4.Sign up for "[employer]. We know it's scary to hear that you might have COVID-19. We want to track your symptoms to make sure you're okay over the next 2 weeks. Please look for an email from "[employer]---this is a free, online program that we'll use to keep in touch. To sign up, follow the link in the email. Learn more at [email or web address]  How can I take care of myself?   Get lots of rest. Drink extra fluids (unless a doctor has told you not to).   Take Tylenol (acetaminophen) for fever or pain. If you have liver or kidney problems, ask your family doctor if it's okay to take Tylenol.   Adults can take either:    650 mg (two 325 mg pills) every 4 to 6 hours, or...   1,000 mg (two 500 mg pills) every 8 hours as needed.    Note: Don't take more than  3,000 mg in one day. Acetaminophen is found in many medicines (both prescribed and over-the-counter medicines). Read all labels to be sure you don't take too much.   For children, check the Tylenol bottle for the right dose. The dose is based on the child's age or weight.    If you have other health problems (like cancer, heart failure, an organ transplant or severe kidney disease): Call your specialty clinic if you don't feel better in the next 2 days.       Know when to call 911. Emergency warning signs include:    Trouble breathing or shortness of breath Pain or pressure in the chest that doesn't go away Feeling confused like you haven't felt before, or not being able to wake up Bluish-colored lips or face.  Where can I get more information?   [facility] -- About COVID-19: [email or web address]   CDC -- What to Do If You're Sick: www.cdc.gov/coronavirus/2019-ncov/about/steps-when-sick.html   CDC -- Ending Home Isolation: www.cdc.gov/coronavirus/2019-ncov/hcp/disposition-in-home-patients.html   CDC -- Caring for Someone: www.cdc.gov/coronavirus/2019-ncov/if-you-are-sick/care-for-someone.html   [facility] -- Interim Guidance for Hospital Discharge to Home: www.health.[facility].mn.us/diseases/coronavirus/hcp/hospdischarge.pdf   [facility] clinical trials (COVID-19 research studies): clinicalaffairs.[facility]-clinical-trials    Below are the COVID-19 hotlines at the [facility] of Health ([facility]). Interpreters are available.    For health questions: Call 010-129-2872 or 1-910.932.6053 (7 a.m. to 7 p.m.) For questions about schools and childcare: Call 399-554-1926 or 1-789.482.7085 (7 a.m. to 7 p.m.)    Diagnosis: Contact with and (suspected) exposure to other viral communicable diseases  Diagnosis ICD: Z20.828

## 2020-12-03 ENCOUNTER — AMBULATORY - HEALTHEAST (OUTPATIENT)
Dept: INTERNAL MEDICINE | Facility: CLINIC | Age: 65
End: 2020-12-03

## 2020-12-03 DIAGNOSIS — Z20.822 EXPOSURE TO COVID-19 VIRUS: ICD-10-CM

## 2020-12-03 DIAGNOSIS — R05.9 COUGH: ICD-10-CM

## 2020-12-04 ENCOUNTER — AMBULATORY - HEALTHEAST (OUTPATIENT)
Dept: FAMILY MEDICINE | Facility: CLINIC | Age: 65
End: 2020-12-04

## 2020-12-04 DIAGNOSIS — R05.9 COUGH: ICD-10-CM

## 2020-12-04 DIAGNOSIS — Z20.822 EXPOSURE TO COVID-19 VIRUS: ICD-10-CM

## 2020-12-06 ENCOUNTER — COMMUNICATION - HEALTHEAST (OUTPATIENT)
Dept: SCHEDULING | Facility: CLINIC | Age: 65
End: 2020-12-06

## 2020-12-09 ENCOUNTER — COMMUNICATION - HEALTHEAST (OUTPATIENT)
Dept: LAB | Facility: CLINIC | Age: 65
End: 2020-12-09

## 2020-12-09 DIAGNOSIS — E03.9 HYPOTHYROIDISM: ICD-10-CM

## 2020-12-10 ENCOUNTER — AMBULATORY - HEALTHEAST (OUTPATIENT)
Dept: INTERNAL MEDICINE | Facility: CLINIC | Age: 65
End: 2020-12-10

## 2020-12-10 DIAGNOSIS — E03.9 HYPOTHYROIDISM: ICD-10-CM

## 2021-01-19 ENCOUNTER — RECORDS - HEALTHEAST (OUTPATIENT)
Dept: MAMMOGRAPHY | Facility: CLINIC | Age: 66
End: 2021-01-19

## 2021-01-19 DIAGNOSIS — Z12.31 ENCOUNTER FOR SCREENING MAMMOGRAM FOR MALIGNANT NEOPLASM OF BREAST: ICD-10-CM

## 2021-03-05 ENCOUNTER — COMMUNICATION - HEALTHEAST (OUTPATIENT)
Dept: INTERNAL MEDICINE | Facility: CLINIC | Age: 66
End: 2021-03-05

## 2021-03-05 DIAGNOSIS — E03.9 HYPOTHYROIDISM: ICD-10-CM

## 2021-03-06 ENCOUNTER — COMMUNICATION - HEALTHEAST (OUTPATIENT)
Dept: INTERNAL MEDICINE | Facility: CLINIC | Age: 66
End: 2021-03-06

## 2021-03-06 DIAGNOSIS — E78.5 HYPERLIPIDEMIA LDL GOAL <100: ICD-10-CM

## 2021-03-07 RX ORDER — ATORVASTATIN CALCIUM 20 MG/1
TABLET, FILM COATED ORAL
Qty: 90 TABLET | Refills: 1 | Status: SHIPPED | OUTPATIENT
Start: 2021-03-07 | End: 2021-09-02

## 2021-03-09 ENCOUNTER — COMMUNICATION - HEALTHEAST (OUTPATIENT)
Dept: INTERNAL MEDICINE | Facility: CLINIC | Age: 66
End: 2021-03-09

## 2021-03-09 DIAGNOSIS — I10 ESSENTIAL HYPERTENSION: ICD-10-CM

## 2021-03-09 DIAGNOSIS — F32.A DEPRESSION, UNSPECIFIED DEPRESSION TYPE: ICD-10-CM

## 2021-03-10 ENCOUNTER — COMMUNICATION - HEALTHEAST (OUTPATIENT)
Dept: INTERNAL MEDICINE | Facility: CLINIC | Age: 66
End: 2021-03-10

## 2021-03-10 DIAGNOSIS — I10 ESSENTIAL HYPERTENSION: ICD-10-CM

## 2021-03-11 RX ORDER — LISINOPRIL 10 MG/1
10 TABLET ORAL DAILY
Qty: 90 TABLET | Refills: 0 | Status: SHIPPED | OUTPATIENT
Start: 2021-03-11 | End: 2022-03-08

## 2021-03-26 ENCOUNTER — AMBULATORY - HEALTHEAST (OUTPATIENT)
Dept: INTERNAL MEDICINE | Facility: CLINIC | Age: 66
End: 2021-03-26

## 2021-03-26 ENCOUNTER — OFFICE VISIT - HEALTHEAST (OUTPATIENT)
Dept: INTERNAL MEDICINE | Facility: CLINIC | Age: 66
End: 2021-03-26

## 2021-03-26 DIAGNOSIS — M17.0 PRIMARY OSTEOARTHRITIS OF BOTH KNEES: ICD-10-CM

## 2021-03-26 DIAGNOSIS — Z00.00 ROUTINE GENERAL MEDICAL EXAMINATION AT A HEALTH CARE FACILITY: ICD-10-CM

## 2021-03-26 DIAGNOSIS — E03.9 HYPOTHYROIDISM, UNSPECIFIED TYPE: ICD-10-CM

## 2021-03-26 DIAGNOSIS — H91.93 BILATERAL HEARING LOSS, UNSPECIFIED HEARING LOSS TYPE: ICD-10-CM

## 2021-03-26 DIAGNOSIS — E03.9 ACQUIRED HYPOTHYROIDISM: ICD-10-CM

## 2021-03-26 DIAGNOSIS — K21.00 GASTROESOPHAGEAL REFLUX DISEASE WITH ESOPHAGITIS WITHOUT HEMORRHAGE: ICD-10-CM

## 2021-03-26 DIAGNOSIS — I10 ESSENTIAL HYPERTENSION: ICD-10-CM

## 2021-03-26 DIAGNOSIS — Z86.0100 HISTORY OF COLON POLYPS: ICD-10-CM

## 2021-03-26 DIAGNOSIS — G47.33 OSA (OBSTRUCTIVE SLEEP APNEA): ICD-10-CM

## 2021-03-26 DIAGNOSIS — E78.5 HYPERLIPIDEMIA, UNSPECIFIED HYPERLIPIDEMIA TYPE: ICD-10-CM

## 2021-03-26 LAB
ALBUMIN SERPL-MCNC: 4.1 G/DL (ref 3.5–5)
ALBUMIN UR-MCNC: NEGATIVE G/DL
ALP SERPL-CCNC: 138 U/L (ref 45–120)
ALT SERPL W P-5'-P-CCNC: 21 U/L (ref 0–45)
ANION GAP SERPL CALCULATED.3IONS-SCNC: 10 MMOL/L (ref 5–18)
APPEARANCE UR: CLEAR
AST SERPL W P-5'-P-CCNC: 17 U/L (ref 0–40)
BACTERIA #/AREA URNS HPF: ABNORMAL /[HPF]
BILIRUB SERPL-MCNC: 0.6 MG/DL (ref 0–1)
BILIRUB UR QL STRIP: NEGATIVE
BUN SERPL-MCNC: 23 MG/DL (ref 8–22)
CALCIUM SERPL-MCNC: 9.5 MG/DL (ref 8.5–10.5)
CHLORIDE BLD-SCNC: 104 MMOL/L (ref 98–107)
CHOLEST SERPL-MCNC: 182 MG/DL
CO2 SERPL-SCNC: 25 MMOL/L (ref 22–31)
COLOR UR AUTO: YELLOW
CREAT SERPL-MCNC: 1.02 MG/DL (ref 0.6–1.1)
ERYTHROCYTE [DISTWIDTH] IN BLOOD BY AUTOMATED COUNT: 12.5 % (ref 11–14.5)
FASTING STATUS PATIENT QL REPORTED: YES
GFR SERPL CREATININE-BSD FRML MDRD: 55 ML/MIN/1.73M2
GLUCOSE BLD-MCNC: 98 MG/DL (ref 70–125)
GLUCOSE UR STRIP-MCNC: NEGATIVE MG/DL
HCT VFR BLD AUTO: 42.2 % (ref 35–47)
HDLC SERPL-MCNC: 62 MG/DL
HGB BLD-MCNC: 14 G/DL (ref 12–16)
HGB UR QL STRIP: ABNORMAL
KETONES UR STRIP-MCNC: NEGATIVE MG/DL
LDLC SERPL CALC-MCNC: 103 MG/DL
LEUKOCYTE ESTERASE UR QL STRIP: NEGATIVE
MCH RBC QN AUTO: 29.6 PG (ref 27–34)
MCHC RBC AUTO-ENTMCNC: 33.2 G/DL (ref 32–36)
MCV RBC AUTO: 89 FL (ref 80–100)
NITRATE UR QL: NEGATIVE
PH UR STRIP: 5 [PH] (ref 5–8)
PLATELET # BLD AUTO: 258 THOU/UL (ref 140–440)
PMV BLD AUTO: 9.6 FL (ref 7–10)
POTASSIUM BLD-SCNC: 4.7 MMOL/L (ref 3.5–5)
PROT SERPL-MCNC: 7.1 G/DL (ref 6–8)
RBC # BLD AUTO: 4.73 MILL/UL (ref 3.8–5.4)
RBC #/AREA URNS AUTO: ABNORMAL HPF
SODIUM SERPL-SCNC: 139 MMOL/L (ref 136–145)
SP GR UR STRIP: 1.02 (ref 1–1.03)
SQUAMOUS #/AREA URNS AUTO: ABNORMAL LPF
TRIGL SERPL-MCNC: 84 MG/DL
TSH SERPL DL<=0.005 MIU/L-ACNC: 0.13 UIU/ML (ref 0.3–5)
UROBILINOGEN UR STRIP-ACNC: ABNORMAL
WBC #/AREA URNS AUTO: ABNORMAL HPF
WBC: 5.9 THOU/UL (ref 4–11)

## 2021-03-26 RX ORDER — LEVOTHYROXINE SODIUM 112 UG/1
112 TABLET ORAL DAILY
Qty: 90 TABLET | Refills: 3 | Status: SHIPPED | OUTPATIENT
Start: 2021-03-26 | End: 2022-03-22

## 2021-03-26 ASSESSMENT — PATIENT HEALTH QUESTIONNAIRE - PHQ9: SUM OF ALL RESPONSES TO PHQ QUESTIONS 1-9: 0

## 2021-03-26 ASSESSMENT — MIFFLIN-ST. JEOR: SCORE: 1419.98

## 2021-05-03 ENCOUNTER — OFFICE VISIT - HEALTHEAST (OUTPATIENT)
Dept: INTERNAL MEDICINE | Facility: CLINIC | Age: 66
End: 2021-05-03

## 2021-05-03 DIAGNOSIS — Z01.818 PREOP GENERAL PHYSICAL EXAM: ICD-10-CM

## 2021-05-03 DIAGNOSIS — Z98.890 POSTOPERATIVE NAUSEA AND VOMITING: ICD-10-CM

## 2021-05-03 DIAGNOSIS — M25.512 CHRONIC LEFT SHOULDER PAIN: ICD-10-CM

## 2021-05-03 DIAGNOSIS — G89.29 CHRONIC LEFT SHOULDER PAIN: ICD-10-CM

## 2021-05-03 DIAGNOSIS — I10 ESSENTIAL HYPERTENSION: ICD-10-CM

## 2021-05-03 DIAGNOSIS — E03.9 HYPOTHYROIDISM, UNSPECIFIED TYPE: ICD-10-CM

## 2021-05-03 DIAGNOSIS — K21.00 GASTROESOPHAGEAL REFLUX DISEASE WITH ESOPHAGITIS WITHOUT HEMORRHAGE: ICD-10-CM

## 2021-05-03 DIAGNOSIS — R11.2 POSTOPERATIVE NAUSEA AND VOMITING: ICD-10-CM

## 2021-05-03 DIAGNOSIS — G47.33 OSA (OBSTRUCTIVE SLEEP APNEA): ICD-10-CM

## 2021-05-03 LAB
ERYTHROCYTE [DISTWIDTH] IN BLOOD BY AUTOMATED COUNT: 12.3 % (ref 11–14.5)
HCT VFR BLD AUTO: 39.1 % (ref 35–47)
HGB BLD-MCNC: 13.2 G/DL (ref 12–16)
MCH RBC QN AUTO: 29.5 PG (ref 27–34)
MCHC RBC AUTO-ENTMCNC: 33.8 G/DL (ref 32–36)
MCV RBC AUTO: 88 FL (ref 80–100)
PLATELET # BLD AUTO: 280 THOU/UL (ref 140–440)
PMV BLD AUTO: 9.7 FL (ref 7–10)
RBC # BLD AUTO: 4.47 MILL/UL (ref 3.8–5.4)
TSH SERPL DL<=0.005 MIU/L-ACNC: 0.16 UIU/ML (ref 0.3–5)
WBC: 6.2 THOU/UL (ref 4–11)

## 2021-05-03 ASSESSMENT — MIFFLIN-ST. JEOR: SCORE: 1429.05

## 2021-05-04 ENCOUNTER — AMBULATORY - HEALTHEAST (OUTPATIENT)
Dept: INTERNAL MEDICINE | Facility: CLINIC | Age: 66
End: 2021-05-04

## 2021-05-04 DIAGNOSIS — E03.9 HYPOTHYROIDISM, UNSPECIFIED TYPE: ICD-10-CM

## 2021-05-26 ENCOUNTER — COMMUNICATION - HEALTHEAST (OUTPATIENT)
Dept: INTERNAL MEDICINE | Facility: CLINIC | Age: 66
End: 2021-05-26

## 2021-05-26 DIAGNOSIS — E03.9 HYPOTHYROIDISM: ICD-10-CM

## 2021-05-26 ASSESSMENT — PATIENT HEALTH QUESTIONNAIRE - PHQ9
SUM OF ALL RESPONSES TO PHQ QUESTIONS 1-9: 0
SUM OF ALL RESPONSES TO PHQ QUESTIONS 1-9: 2

## 2021-05-27 VITALS
TEMPERATURE: 97.2 F | DIASTOLIC BLOOD PRESSURE: 80 MMHG | HEIGHT: 62 IN | SYSTOLIC BLOOD PRESSURE: 124 MMHG | HEART RATE: 81 BPM | OXYGEN SATURATION: 98 % | BODY MASS INDEX: 37.36 KG/M2 | WEIGHT: 203 LBS

## 2021-05-27 ASSESSMENT — PATIENT HEALTH QUESTIONNAIRE - PHQ9: SUM OF ALL RESPONSES TO PHQ QUESTIONS 1-9: 0

## 2021-05-27 NOTE — TELEPHONE ENCOUNTER
RN cannot approve Refill Request    RN can NOT refill this medication overdue for office visits and/or labs.    Duglas Neri, [facility] Triage/Med Refill 4/4/2019    Requested Prescriptions   Pending Prescriptions Disp Refills     levothyroxine (SYNTHROID, LEVOTHROID) 125 MCG tablet [Pharmacy Med Name: LEVOTHYROXINE 125 MCG TABLET] 90 tablet 0     Sig: TAKE 1 TABLET BY MOUTH EVERY DAY    Thyroid Hormones Protocol Failed - 4/3/2019  9:33 AM       Failed - Provider visit in past 12 months or next 3 months    Last office visit with prescriber/PCP: 10/26/2017 Ho Quezada MD OR same dept: Visit date not found OR same specialty: 10/26/2017 Ho Quezada MD  Last physical: 2/8/2018 Last [facility]TM visit: Visit date not found   Next visit within 3 mo: Visit date not found  Next physical within 3 mo: Visit date not found  Prescriber OR PCP: Ho Quezada MD  Last diagnosis associated with med order: 1. Hypothyroidism  - levothyroxine (SYNTHROID, LEVOTHROID) 125 MCG tablet [Pharmacy Med Name: LEVOTHYROXINE 125 MCG TABLET]; TAKE 1 TABLET BY MOUTH EVERY DAY  Dispense: 90 tablet; Refill: 0    If protocol passes may refill for 12 months if within 3 months of last provider visit (or a total of 15 months).            Failed - TSH on file in past 12 months for patient age 12 & older    TSH   Date Value Ref Range Status   02/08/2018 0.21 (L) 0.30 - 5.00 uIU/mL Final

## 2021-05-27 NOTE — TELEPHONE ENCOUNTER
RN cannot approve Refill Request    RN can NOT refill this medication PCP messaged that patient is overdue for Office Visit.       Leticia Batista, [facility] Triage/Med Refill 4/9/2019    Requested Prescriptions   Pending Prescriptions Disp Refills     citalopram (CELEXA) 20 MG tablet [Pharmacy Med Name: CITALOPRAM HBR 20 MG TABLET] 90 tablet 3     Sig: TAKE 1 TABLET BY MOUTH EVERY DAY       [facility] Refill Protocol  Failed - 4/8/2019  1:07 AM        Failed - PCP or prescribing provider visit in last year     Last office visit with prescriber/PCP: 10/26/2017 Ho Quezada MD OR same dept: Visit date not found OR same specialty: 10/26/2017 Ho Quezada MD  Last physical: 2/8/2018 Last MTM visit: Visit date not found   Next visit within 3 mo: Visit date not found  Next physical within 3 mo: Visit date not found  Prescriber OR PCP: Ho Quezada MD  Last diagnosis associated with med order: There are no diagnoses linked to this encounter.  If protocol passes may refill for 12 months if within 3 months of last provider visit (or a total of 15 months).

## 2021-05-28 NOTE — PROGRESS NOTES
Preoperative Exam    Scheduled Procedure: Left Knee Meniscus Repair  Surgery Date:  5/20/2019  Surgery [facility]: [facility] fax [phone]    Surgeon:  Dr Smith    Assessment/Plan:     1. Encounter for preoperative examination for general surgical procedure  No contraindications to proceeding with planned surgery and anesthesia.  Overall risk is low.  She will hold aspirin and NSAIDs for 1 week prior to surgery.  DVT prophylaxis postoperatively including aspirin 325 mg twice daily or per usual protocol.  She has experienced nausea following anesthesia and prophylaxis should be provided.  - HM2(CBC w/o Differential)  - Electrocardiogram Perform - Clinic  - Basic Metabolic Panel    2. Acute torn meniscus of knee, left, sequela  Chronic left knee pain secondary to torn meniscus not responding to conservative treatment needing arthroscopic knee surgery    3. Essential hypertension  Excellent blood pressure control with current dose of lisinopril.  She will hold her ACE inhibitor on the morning of surgery.    4. HONEY (obstructive sleep apnea)  She does not require CPAP.  She uses a mouthguard.    5. Hypothyroidism, unspecified type  We will recheck TSH and adjust dose of Synthroid if necessary  - Thyroid Stimulating Hormone (TSH)    6. Hyperlipidemia, unspecified hyperlipidemia type  She continues on atorvastatin daily    7. Medication monitoring encounter  Monitor LFTs while on statin  - Hepatic Profile        Surgical Procedure Risk: Intermediate (reported cardiac risk generally 1-5%)  Have you had prior anesthesia?: Yes  Have you or any family members had a previous anesthesia reaction:   Nausea following anesthesia  Do you or any family members have a history of a clotting or bleeding disorder?: No  Cardiac Risk Assessment: no increased risk for major cardiac complications    Patient approved for surgery with general or local anesthesia.        Functional Status: Independent  Patient plans to recover at home with  family.     Subjective:      Kandi Kim is a 63 y.o. female who presents for a preoperative consultation.  Chronic left knee pain secondary to torn medial meniscus no longer responding to conservative treatment including cortisone injections and therapy with plans for arthroscopic knee surgery next week.    No personal or family history of DVT or pulmonary embolus.    She has experienced nausea following anesthesia but does well when given premedication    No history of coronary artery disease or congestive heart failure.  Denies any exertional chest pain.  Hypertension well-controlled with lisinopril.      All other systems reviewed and are negative, other than those listed in the HPI.    Pertinent History  Do you have difficulty breathing or chest pain after walking up a flight of stairs: No  History of obstructive sleep apnea: Yes: uses mouth guard  Steroid use in the last 6 months: No  Frequent Aspirin/NSAID use: Yes: Stopped Aspirin 81 mg May 12  Prior Blood Transfusion: No  Prior Blood Transfusion Reaction: No  If for some reason prior to, during or after the procedure, if it is medically indicated, would you be willing to have a blood transfusion?:  There is no transfusion refusal.    Current Outpatient Medications   Medication Sig Dispense Refill     aspirin 81 MG EC tablet Take 81 mg by mouth daily.       atorvastatin (LIPITOR) 20 MG tablet Take 1 tablet (20 mg total) by mouth daily. 90 tablet 0     citalopram (CELEXA) 20 MG tablet TAKE 1 TABLET BY MOUTH EVERY DAY 90 tablet 0     levothyroxine (SYNTHROID, LEVOTHROID) 125 MCG tablet Take 1 tablet (125 mcg total) by mouth daily. Follow-up appointment needed for future refills.  Please schedule annual physical 90 tablet 0     lisinopril (PRINIVIL,ZESTRIL) 10 MG tablet TAKE 1 TABLET (10 MG TOTAL) BY MOUTH DAILY. 90 tablet 3     No current facility-administered medications for this visit.         No Known Allergies    Patient Active Problem List    Diagnosis     HTN (hypertension)     Hypothyroidism     Hyperlipidemia     HONEY (obstructive sleep apnea)     Postmenopausal symptoms     Migraines     Osteoarthritis     Chronic sinusitis     History of colon polyps     Acute torn meniscus of knee, left, sequela       Past Medical History:   Diagnosis Date     Acute torn meniscus of knee, left, initial encounter 2/8/2018     Acute torn meniscus of knee, left, sequela 5/14/2019     Chronic sinusitis      Diverticulitis 08/09/2016    CT scan normal     History of colon polyps     Colonoscopy December 2018 with multiple polyps.  Repeat in 3 years.  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     Migraines      HONEY (obstructive sleep apnea)      Osteoarthritis     knees, hands     PONV (postoperative nausea and vomiting)      Postmenopausal symptoms      Primary osteoarthritis of right knee 04/12/2016    R TKA     Rectal bleeding 8/9/2016     Rectocele 11/11/2016    Surgery is planned     Screening     DEXA normal 2016     Situational anxiety        Past Surgical History:   Procedure Laterality Date     BUNIONECTOMY       COLPORRHAPHY N/A 2/21/2017    Procedure: POSTERIOR COLPORRHAPHY;  Surgeon: Roxana Frias MD;  Location: [facility];  Service:      KNEE ARTHROPLASTY Right 04/2016     KNEE ARTHROSCOPY Bilateral 2011 2014     MIDDLE EAR SURGERY       NEPHRECTOMY Left     Kidney donor     OOPHORECTOMY       TUBAL LIGATION       VAGINAL HYSTERECTOMY N/A 2/21/2017    Procedure: TOTAL VAGINAL HYSTERECTOMY, BILATERAL SALPINGO-OOPHORECTOMY;  Surgeon: Roxana Frias MD;  Location: [facility];  Service:        Social History     Socioeconomic History     Marital status:      Spouse name: Not on file     Number of children: Not on file     Years of education: Not on file     Highest education level: Not on file   Occupational History     Not on file  "  Social Needs     Financial resource strain: Not on file     Food insecurity:     Worry: Not on file     Inability: Not on file     Transportation needs:     Medical: Not on file     Non-medical: Not on file   Tobacco Use     Smoking status: Never Smoker     Smokeless tobacco: Never Used   Substance and Sexual Activity     Alcohol use: Yes     Comment: infrequent     Drug use: No     Sexual activity: Not on file   Lifestyle     Physical activity:     Days per week: Not on file     Minutes per session: Not on file     Stress: Not on file   Relationships     Social connections:     Talks on phone: Not on file     Gets together: Not on file     Attends Holiness service: Not on file     Active member of club or organization: Not on file     Attends meetings of clubs or organizations: Not on file     Relationship status: Not on file     Intimate partner violence:     Fear of current or ex partner: Not on file     Emotionally abused: Not on file     Physically abused: Not on file     Forced sexual activity: Not on file   Other Topics Concern     Not on file   Social History Narrative    , [place]     1 child    3M             Objective:     Vitals:    05/14/19 1359   BP: 122/74   Pulse: 77   SpO2: 95%   Weight: 199 lb (90.3 kg)   Height: 5' 2\" (1.575 m)         Physical Exam:  HEENT normal  RESPIRATORY: Breathing pattern was normal and the chest moved symmetrically.   Lung sounds were normal and there were no rales or wheezes.  CARDIOVASCULAR: Heart rate and rhythm were normal.  S1 and S2 were normal and there were no extra sounds or murmurs. Peripheral pulses in arms and legs were normal.  Jugular venous pressure was normal.  There was no peripheral edema.  No carotid bruits.  GASTROINTESTINAL: The abdomen was normal in contour.  Bowel sounds were present.   Palpation detected no tenderness, mass, or enlarged organs.   SKIN/HAIR/NAILS: No cyanosis or pallor  NEUROLOGIC: Grossly nonfocal  PSYCHIATRIC:  Mood and " affect were normal     Patient Instructions     Hold all supplements, aspirin and NSAIDs for 7 days prior to surgery.  Follow your surgeon's direction on when to stop eating and drinking prior to surgery.  Your surgeon will be managing your pain after your surgery.    Hold all of your usual medications in the morning of surgery including lisinopril      EKG: Normal sinus rhythm.  No significant ST or T wave abnormality.  Poor R wave progression across anterior leads but no significant change from previous EKG.    Labs:  Hemoglobin 14.4 platelet 276  Potassium 4.5 creatinine 1.10    Immunization History   Administered Date(s) Administered     Influenza, inj, historic,unspecified 09/26/2016, 09/26/2017     Tdap 01/03/2013     ZOSTER, LIVE 10/24/2017           Electronically signed by Ho Quezada MD 05/14/19 2:01 PM

## 2021-05-28 NOTE — TELEPHONE ENCOUNTER
RN cannot approve Refill Request    RN can NOT refill this medication PCP messaged that patient is overdue for Office Visit.      Leticia Batisat, [facility] Connection Triage/Med Refill 5/8/2019    Requested Prescriptions   Pending Prescriptions Disp Refills     citalopram (CELEXA) 20 MG tablet [Pharmacy Med Name: CITALOPRAM HBR 20 MG TABLET] 30 tablet 0     Sig: TAKE 1 TABLET BY MOUTH EVERY DAY       [facility] Refill Protocol  Failed - 5/8/2019  9:05 AM        Failed - PCP or prescribing provider visit in last year     Last office visit with prescriber/PCP: Visit date not found OR same dept: Visit date not found OR same specialty: 10/26/2017 Ho Quezada MD  Last physical: Visit date not found Last MTM visit: Visit date not found   Next visit within 3 mo: Visit date not found  Next physical within 3 mo: Visit date not found  Prescriber OR PCP: Charles Dasilva MD  Last diagnosis associated with med order: 1. Depression, unspecified depression type  - citalopram (CELEXA) 20 MG tablet [Pharmacy Med Name: CITALOPRAM HBR 20 MG TABLET]; TAKE 1 TABLET BY MOUTH EVERY DAY  Dispense: 30 tablet; Refill: 0    If protocol passes may refill for 12 months if within 3 months of last provider visit (or a total of 15 months).

## 2021-05-28 NOTE — PATIENT INSTRUCTIONS - HE
Hold all supplements, aspirin and NSAIDs for 7 days prior to surgery.  Follow your surgeon's direction on when to stop eating and drinking prior to surgery.  Your surgeon will be managing your pain after your surgery.    Hold all of your usual medications in the morning of surgery including lisinopril

## 2021-05-29 NOTE — TELEPHONE ENCOUNTER
Refill Approved    Rx renewed per Medication Renewal Policy. Medication was last renewed on 1/30/19.    Leticia Batista, [facility] Triage/Med Refill 5/23/2019     Requested Prescriptions   Pending Prescriptions Disp Refills     atorvastatin (LIPITOR) 20 MG tablet [Pharmacy Med Name: ATORVASTATIN 20 MG TABLET] 90 tablet 0     Sig: TAKE 1 TABLET BY MOUTH EVERY DAY       Statins Refill Protocol (Hmg CoA Reductase Inhibitors) Passed - 5/22/2019 11:12 AM        Passed - PCP or prescribing provider visit in past 12 months      Last office visit with prescriber/PCP: 10/26/2017 Ho Quezada MD OR same dept: Visit date not found OR same specialty: 10/26/2017 Ho Quezada MD  Last physical: 5/14/2019 Last MTM visit: Visit date not found   Next visit within 3 mo: Visit date not found  Next physical within 3 mo: Visit date not found  Prescriber OR PCP: Ho Quezada MD  Last diagnosis associated with med order: 1. Hyperlipidemia LDL goal <100  - atorvastatin (LIPITOR) 20 MG tablet [Pharmacy Med Name: ATORVASTATIN 20 MG TABLET]; TAKE 1 TABLET BY MOUTH EVERY DAY  Dispense: 90 tablet; Refill: 0    If protocol passes may refill for 12 months if within 3 months of last provider visit (or a total of 15 months).

## 2021-05-30 ENCOUNTER — RECORDS - HEALTHEAST (OUTPATIENT)
Dept: ADMINISTRATIVE | Facility: CLINIC | Age: 66
End: 2021-05-30

## 2021-05-30 VITALS — BODY MASS INDEX: 35.7 KG/M2 | HEIGHT: 62 IN | WEIGHT: 194 LBS

## 2021-05-30 VITALS — BODY MASS INDEX: 36.42 KG/M2 | WEIGHT: 197.9 LBS | HEIGHT: 62 IN

## 2021-05-30 VITALS — BODY MASS INDEX: 35.51 KG/M2 | HEIGHT: 62 IN | WEIGHT: 193 LBS

## 2021-05-30 NOTE — TELEPHONE ENCOUNTER
Refill Approved    Rx renewed per Medication Renewal Policy. Medication was last renewed on 4/4/2019 for 90/0.  Last OV 5/14/2019  Vandana Aburto, [facility] Triage/Med Refill 7/8/2019     Requested Prescriptions   Pending Prescriptions Disp Refills     levothyroxine (SYNTHROID, LEVOTHROID) 125 MCG tablet [Pharmacy Med Name: LEVOTHYROXINE 125 MCG TABLET] 90 tablet 0     Sig: TAKE 1 TABLET BY MOUTH DAILY. FOLLOW-UP APPOINTMENT NEEDED FOR FUTURE REFILLS       Thyroid Hormones Protocol Passed - 7/7/2019  8:31 AM        Passed - Provider visit in past 12 months or next 3 months     Last office visit with prescriber/PCP: 10/26/2017 Ho Quezada MD OR same dept: Visit date not found OR same specialty: 10/26/2017 Ho Quezada MD  Last physical: 5/14/2019 Last MTM visit: Visit date not found   Next visit within 3 mo: Visit date not found  Next physical within 3 mo: Visit date not found  Prescriber OR PCP: Ho Quezada MD  Last diagnosis associated with med order: 1. Hypothyroidism  - levothyroxine (SYNTHROID, LEVOTHROID) 125 MCG tablet [Pharmacy Med Name: LEVOTHYROXINE 125 MCG TABLET]; TAKE 1 TABLET BY MOUTH DAILY. FOLLOW-UP APPOINTMENT NEEDED FOR FUTURE REFILLS  Dispense: 90 tablet; Refill: 0    If protocol passes may refill for 12 months if within 3 months of last provider visit (or a total of 15 months).             Passed - TSH on file in past 12 months for patient age 12 & older     TSH   Date Value Ref Range Status   05/14/2019 0.24 (L) 0.30 - 5.00 uIU/mL Final

## 2021-05-31 VITALS — HEIGHT: 62 IN | BODY MASS INDEX: 33.86 KG/M2 | WEIGHT: 184 LBS

## 2021-05-31 NOTE — TELEPHONE ENCOUNTER
Refill Approved    Rx renewed per Medication Renewal Policy. Medication was last renewed on 5.14.19 has px scheduled end of August.    Rica Trimble, [facility] Connection Triage/Med Refill 8/12/2019     Requested Prescriptions   Pending Prescriptions Disp Refills     citalopram (CELEXA) 20 MG tablet [Pharmacy Med Name: CITALOPRAM HBR 20 MG TABLET] 90 tablet 0     Sig: TAKE 1 TABLET BY MOUTH EVERY DAY       [facility] Refill Protocol  Passed - 8/12/2019  1:07 AM        Passed - PCP or prescribing provider visit in last year     Last office visit with prescriber/PCP: 10/26/2017 Ho Quezada MD OR same dept: Visit date not found OR same specialty: 10/26/2017 Ho Quezada MD  Last physical: 5/14/2019 Last MTM visit: Visit date not found   Next visit within 3 mo: Visit date not found  Next physical within 3 mo: Visit date not found  Prescriber OR PCP: Ho Quezada MD  Last diagnosis associated with med order: 1. Depression, unspecified depression type  - citalopram (CELEXA) 20 MG tablet [Pharmacy Med Name: CITALOPRAM HBR 20 MG TABLET]; TAKE 1 TABLET BY MOUTH EVERY DAY  Dispense: 90 tablet; Refill: 0    If protocol passes may refill for 12 months if within 3 months of last provider visit (or a total of 15 months).

## 2021-06-01 VITALS — HEIGHT: 62 IN | WEIGHT: 185 LBS | BODY MASS INDEX: 34.04 KG/M2

## 2021-06-02 NOTE — PROGRESS NOTES
Office Visit   Kandi Kim   64 y.o. female    Date of Visit: 10/29/2019    Chief Complaint   Patient presents with     Follow-up     dizziness x 2 weeks-pt states she is under a lot of stress at work, but the pain is concentrated on the left side of her head,pt did go to her dentist today for a mouth guard        Assessment and Plan   1. Dizziness  This is of unclear etiology but she is having an odd sensation in the left side of her head.  Is not a headache but a fullness as well as these episodes of dizziness.  Is been going on a couple of weeks.  Her neurologic evaluation today is normal.  Her pulse is regular but slightly tachycardic.  Blood pressure is excellent and recent blood work was all satisfactory.  At this point I think it would be beneficial to get an MRI and have set that up for her.  I will get back to her with the results.  She is in agreement with this plan.  - MR Brain With Without Contrast; Future    2. Fullness in head         No follow-ups on file.     History of Present Illness   This 64 y.o. old female comes in with dizziness and head fullness.  Is been going on for a couple of weeks.  The new symptom for her.  She feels a little bit unsteady at times.  Has a strange sensation on the left side of her brain.  Has not really had a headache and no other focal neurologic symptoms.  No numbness or tingling no weakness or speech difficulty.  No vision change.  These episodes are not improving and continue.  She has not had any fevers or chills and no cold symptoms.  She was seen in urgent care over the weekend and had blood work drawn that was all normal.  An EKG was normal.  Her blood pressure is well controlled and she has not had any chest pain or palpitations.    Review of Systems: As above, systems otherwise reviewed and negative.     Medications, Allergies and Problem List   Patient Active Problem List   Diagnosis     HTN (hypertension)     Hypothyroidism     Hyperlipidemia     HONEY  "(obstructive sleep apnea)     Postmenopausal symptoms     Migraines     Osteoarthritis     Chronic sinusitis     History of colon polyps     Acute torn meniscus of knee, left, sequela     GERD with esophagitis     Current Outpatient Medications   Medication Sig Dispense Refill     naproxen (NAPROSYN) 500 MG tablet Take 500 mg by mouth.       aspirin 81 MG EC tablet Take 81 mg by mouth daily.       atorvastatin (LIPITOR) 20 MG tablet TAKE 1 TABLET BY MOUTH EVERY DAY 90 tablet 3     citalopram (CELEXA) 20 MG tablet TAKE 1 TABLET BY MOUTH EVERY DAY 90 tablet 0     levothyroxine (SYNTHROID, LEVOTHROID) 125 MCG tablet Take 1 tablet (125 mcg total) by mouth Daily at 6:00 am. 90 tablet 3     lisinopril (PRINIVIL,ZESTRIL) 10 MG tablet TAKE 1 TABLET (10 MG TOTAL) BY MOUTH DAILY. 90 tablet 3     omeprazole (PRILOSEC OTC) 20 MG tablet Take 1 tablet (20 mg total) by mouth daily before breakfast. 28 tablet 1     No current facility-administered medications for this visit.      No Known Allergies       Physical Exam     /68   Pulse (!) 102   Ht 5' 2\" (1.575 m)   Wt 206 lb (93.4 kg)   LMP  (LMP Unknown)   SpO2 97% Comment: RA  BMI 37.68 kg/m      General:  Patient is alert and in no apparent distress.  Neck:  Supple with no adenopathy or thyroid abnormality noted.  Cardiovascular: Slight tachycardia but regular rhythm and normal S1-S2 with no murmurs rubs or gallops..  Pulmonary:  Lungs are clear to auscultation bilaterally with normal respiratory effort.  Neurologic Cranial nerves are intact.  Normal and symmetric.  Deep tendon reflexes are normal and symmetric.  Romberg is negative.  No focal deficits.  Psychiatric:  Pleasant, no confusion or agitation   Skin:  Warm and well perfused with no rashes or abnormalities.         Additional Information   Social History     Tobacco Use     Smoking status: Never Smoker     Smokeless tobacco: Never Used   Substance Use Topics     Alcohol use: Yes     Comment: infrequent     " Drug use: No              Shavonne Knutson MD

## 2021-06-02 NOTE — TELEPHONE ENCOUNTER
Spoke with the patient and relayed message below from Dr. Quezada.  She verbalized understanding and had been scheduled to see Dr. Knutson tomorrow afternoon at 2:20 pm.  Patient had no further questions at this time.  Lucrecia HU CMA/EDWIN....................12:55 PM

## 2021-06-02 NOTE — TELEPHONE ENCOUNTER
New Appointment Needed  What is the reason for the visit:    dizziness  Provider Preference: PCP only  How soon do you need to be seen?: this week, anytime is ok  Waitlist offered?: No  Okay to leave a detailed message:  Yes

## 2021-06-02 NOTE — TELEPHONE ENCOUNTER
This is a different kind of contrast that does no effect the kidneys.  MRI's are much better with the contrast which is gadolinium.  Thanks.

## 2021-06-02 NOTE — TELEPHONE ENCOUNTER
I do not have any openings this week.  If I get any cancellations, I will work.  Into the schedule.  However for this specific symptom, I would be comfortable her seeing 1 of my colleagues.

## 2021-06-02 NOTE — TELEPHONE ENCOUNTER
Pt is questioning if she should have her MRI w/Contrast because she only has one Kidney.     Please place new order if it should be W/O Contrast      Thank you

## 2021-06-03 ENCOUNTER — COMMUNICATION - HEALTHEAST (OUTPATIENT)
Dept: INTERNAL MEDICINE | Facility: CLINIC | Age: 66
End: 2021-06-03

## 2021-06-03 VITALS
SYSTOLIC BLOOD PRESSURE: 114 MMHG | OXYGEN SATURATION: 97 % | BODY MASS INDEX: 37.91 KG/M2 | WEIGHT: 206 LBS | DIASTOLIC BLOOD PRESSURE: 68 MMHG | HEART RATE: 102 BPM | HEIGHT: 62 IN

## 2021-06-03 VITALS — WEIGHT: 199 LBS | HEIGHT: 62 IN | BODY MASS INDEX: 36.62 KG/M2

## 2021-06-03 VITALS — WEIGHT: 203 LBS | BODY MASS INDEX: 37.36 KG/M2 | HEIGHT: 62 IN

## 2021-06-03 DIAGNOSIS — F32.A DEPRESSION, UNSPECIFIED DEPRESSION TYPE: ICD-10-CM

## 2021-06-04 VITALS
DIASTOLIC BLOOD PRESSURE: 80 MMHG | BODY MASS INDEX: 36.25 KG/M2 | WEIGHT: 197 LBS | HEIGHT: 62 IN | OXYGEN SATURATION: 97 % | SYSTOLIC BLOOD PRESSURE: 110 MMHG | HEART RATE: 104 BPM

## 2021-06-04 VITALS
WEIGHT: 205 LBS | HEART RATE: 82 BPM | OXYGEN SATURATION: 99 % | BODY MASS INDEX: 37.73 KG/M2 | SYSTOLIC BLOOD PRESSURE: 128 MMHG | HEIGHT: 62 IN | DIASTOLIC BLOOD PRESSURE: 80 MMHG

## 2021-06-04 RX ORDER — CITALOPRAM HYDROBROMIDE 20 MG/1
TABLET ORAL
Qty: 90 TABLET | Refills: 3 | Status: SHIPPED | OUTPATIENT
Start: 2021-06-04 | End: 2022-06-08

## 2021-06-04 NOTE — PROGRESS NOTES
Preoperative Exam    Scheduled Procedure: Total Left Knee Replacement  Surgery Date:  01/22/20  Surgery Location: [facility] fax [phone]    Surgeon:  Dr Malone    Assessment/Plan:     1. Encounter for preoperative examination for general surgical procedure  Overall risk is low and good candidate for surgery.  No contraindications to proceeding with planned surgery and general anesthesia.  She will avoid aspirin and NSAIDs for 1 week prior to surgery.  DVT prophylaxis with aspirin twice daily for 4 weeks or per usual protocol.  - Urinalysis-UC if Indicated  - HM2(CBC w/o Differential)  - Electrocardiogram Perform - Clinic  - Basic Metabolic Panel    2. Primary osteoarthritis of left knee  Worsening pain involving left knee secondary to severe osteoarthritis with plans for left total knee arthroplasty later this month    3. Essential hypertension  Excellent blood pressure control with lisinopril.  She will hold her usual dose in the morning of surgery but this should be resumed postoperatively  - Electrocardiogram Perform - Clinic  - Basic Metabolic Panel    4. GERD with esophagitis  Well-controlled with omeprazole.  She will take her usual dose on the morning of surgery and this should be continued postoperatively    5. HONEY (obstructive sleep apnea)  She wears a mouthguard    6. Hypothyroidism, unspecified type  Continue Synthroid.  She will take on the morning of surgery and this should be continued postoperatively    7. Hyperlipidemia, unspecified hyperlipidemia type  She takes atorvastatin daily.  Continue postoperatively        Surgical Procedure Risk: Intermediate (reported cardiac risk generally 1-5%)  Have you had prior anesthesia?: Yes  Have you or any family members had a previous anesthesia reaction:  No  Do you or any family members have a history of a clotting or bleeding disorder?: No  Cardiac Risk Assessment: no increased risk for major cardiac complications    APPROVAL GIVEN to proceed  with proposed procedure, without further diagnostic evaluation    Please Note:  She uses a mouthguard at night for sleep apnea    Functional Status: Independent  Patient plans to recover at home with family.     Subjective:      Kandi Kim is a 64 y.o. female who presents for a preoperative consultation.  Worsening left knee pain secondary to severe osteoarthritis.  Plans for left total knee arthroplasty later this month.  She had right knee replacement recently with no problems.  She has tolerated anesthesia in previous surgeries without difficulty.    No personal or family history of DVT or pulmonary embolus    No history of coronary artery disease.  Denies any exertional chest pain.  Able to walk over 4 blocks without any difficulty.  No chronic dyspnea.        All other systems reviewed and are negative, other than those listed in the HPI.    Pertinent History  Do you have difficulty breathing or chest pain after walking up a flight of stairs: No  History of obstructive sleep apnea: Yes: patient sleeps with mouth guard  Steroid use in the last 6 months: Yes: cortizone injections  Frequent Aspirin/NSAID use: Yes: aspirin 81 mg daily  Prior Blood Transfusion: No  Prior Blood Transfusion Reaction: No  If for some reason prior to, during or after the procedure, if it is medically indicated, would you be willing to have a blood transfusion?:  There is no transfusion refusal.    Current Outpatient Medications   Medication Sig Dispense Refill     aspirin 81 MG EC tablet Take 81 mg by mouth daily.       atorvastatin (LIPITOR) 20 MG tablet TAKE 1 TABLET BY MOUTH EVERY DAY 90 tablet 3     citalopram (CELEXA) 20 MG tablet TAKE 1 TABLET BY MOUTH EVERY DAY 90 tablet 0     levothyroxine (SYNTHROID, LEVOTHROID) 125 MCG tablet Take 1 tablet (125 mcg total) by mouth Daily at 6:00 am. 90 tablet 3     lisinopril (PRINIVIL,ZESTRIL) 10 MG tablet TAKE 1 TABLET (10 MG TOTAL) BY MOUTH DAILY. 90 tablet 3     omeprazole  (PRILOSEC OTC) 20 MG tablet Take 1 tablet (20 mg total) by mouth daily before breakfast. 28 tablet 1     No current facility-administered medications for this visit.         No Known Allergies    Patient Active Problem List   Diagnosis     HTN (hypertension)     Hypothyroidism     Hyperlipidemia     HONEY (obstructive sleep apnea)     Postmenopausal symptoms     Migraines     Osteoarthritis     Chronic sinusitis     History of colon polyps     Acute torn meniscus of knee, left, sequela     GERD with esophagitis     Primary osteoarthritis of left knee       Past Medical History:   Diagnosis Date     Acute torn meniscus of knee, left, initial encounter 2/8/2018     Acute torn meniscus of knee, left, sequela 5/14/2019     Chronic sinusitis      Diverticulitis 08/09/2016    CT scan normal     GERD with esophagitis     Complaints of dysphagia with EGD showing esophagitis.  Negative for eosinophilic esophagitis September 2019     History of colon polyps     Colonoscopy December 2018 with multiple polyps.  Repeat in 3 years.  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     Migraines      HONEY (obstructive sleep apnea)      Osteoarthritis     knees, hands     PONV (postoperative nausea and vomiting)      Postmenopausal symptoms      Primary osteoarthritis of right knee 04/12/2016    R TKA     Rectal bleeding 8/9/2016     Rectocele 11/11/2016    Surgery is planned     Screening     DEXA normal 2016     Situational anxiety        Past Surgical History:   Procedure Laterality Date     BUNIONECTOMY       COLPORRHAPHY N/A 2/21/2017    Procedure: POSTERIOR COLPORRHAPHY;  Surgeon: Roxana Frias MD;  Location: [facility];  Service:      KNEE ARTHROPLASTY Right 04/2016     KNEE ARTHROSCOPY Bilateral 2011 2014     MIDDLE EAR SURGERY       NEPHRECTOMY Left     Kidney donor     OOPHORECTOMY       TUBAL LIGATION       VAGINAL  "HYSTERECTOMY N/A 2/21/2017    Procedure: TOTAL VAGINAL HYSTERECTOMY, BILATERAL SALPINGO-OOPHORECTOMY;  Surgeon: Roxana Frias MD;  Location: [facility] Main OR;  Service:        Social History     Socioeconomic History     Marital status:      Spouse name: Not on file     Number of children: Not on file     Years of education: Not on file     Highest education level: Not on file   Occupational History     Not on file   Social Needs     Financial resource strain: Not on file     Food insecurity:     Worry: Not on file     Inability: Not on file     Transportation needs:     Medical: Not on file     Non-medical: Not on file   Tobacco Use     Smoking status: Never Smoker     Smokeless tobacco: Never Used   Substance and Sexual Activity     Alcohol use: Yes     Comment: infrequent     Drug use: No     Sexual activity: Not on file   Lifestyle     Physical activity:     Days per week: Not on file     Minutes per session: Not on file     Stress: Not on file   Relationships     Social connections:     Talks on phone: Not on file     Gets together: Not on file     Attends Yazidism service: Not on file     Active member of club or organization: Not on file     Attends meetings of clubs or organizations: Not on file     Relationship status: Not on file     Intimate partner violence:     Fear of current or ex partner: Not on file     Emotionally abused: Not on file     Physically abused: Not on file     Forced sexual activity: Not on file   Other Topics Concern     Not on file   Social History Narrative    , [place]     1 child    3M             Objective:     Vitals:    01/06/20 0820   BP: 128/80   Pulse: 82   SpO2: 99%   Weight: 205 lb (93 kg)   Height: 5' 2\" (1.575 m)         Physical Exam:  HEENT normal  RESPIRATORY: Breathing pattern was normal and the chest moved symmetrically.   Lung sounds were normal and there were no rales or wheezes.  CARDIOVASCULAR: Heart rate and rhythm were normal.  S1 and S2 were " normal and there were no extra sounds or murmurs. Peripheral pulses in arms and legs were normal.  Jugular venous pressure was normal.  There was no peripheral edema.  No carotid bruits.  GASTROINTESTINAL: The abdomen was normal in contour.  Bowel sounds were present.   Palpation detected no tenderness, mass, or enlarged organs.   SKIN/HAIR/NAILS: No cyanosis or pallor  NEUROLOGIC: Grossly nonfocal  PSYCHIATRIC:  Mood and affect were normal     Patient Instructions      Before Your Surgery       Call your surgeon if there is any change in your health. This includes signs of a cold or flu (such as a sore throat, runny nose, cough, rash or fever).       Do not smoke, drink alcohol or take over the counter medicine (unless your surgeon or primary care doctor tells you to) for the 24 hours before and after surgery.       If you take prescribed drugs: Follow your doctor s orders about which medicines to take and which to stop until after surgery.      Eating and drinking prior to surgery: follow the instructions from your surgeon.      Take a shower or bath the night before surgery. Use the soap your surgeon gave you to gently clean your skin. If you do not have soap from your surgeon, use your regular soap. Do not shave or scrub the surgery site. Wear clean pajamas and have clean sheets on your bed.             Hold all supplements, aspirin and NSAIDs including Naprosyn for 7 days prior to surgery.    Follow your surgeon's direction on when to stop eating and drinking prior to surgery.    Your surgeon will be managing your pain after your surgery.      Remove all jewelry and metal piercings before your surgery.     Remove nail polish from fingers before surgery.    Take your usual dose of omeprazole and citalopram on the morning of surgery.  Hold other medications including lisinopril.    Bring your mouthguard with you during hospitalization      EKG: Normal sinus rhythm.  No ST or T wave abnormality.  Question of poor  R wave progression across anterior leads previously seen but no change from previous EKG.    I personally reviewed and interpreted her EKG    Labs:  Potassium 4.6 creatinine 0.97  Hemoglobin 14.2 platelet 224  UA normal      Immunization History   Administered Date(s) Administered     Influenza, inj, historic,unspecified 09/26/2016, 09/26/2017, 10/21/2019     Tdap 01/03/2013     ZOSTER, LIVE 10/24/2017           Electronically signed by Ho Quezada MD 01/06/20 8:22 AM

## 2021-06-04 NOTE — PATIENT INSTRUCTIONS - HE
Before Your Surgery       Call your surgeon if there is any change in your health. This includes signs of a cold or flu (such as a sore throat, runny nose, cough, rash or fever).       Do not smoke, drink alcohol or take over the counter medicine (unless your surgeon or primary care doctor tells you to) for the 24 hours before and after surgery.       If you take prescribed drugs: Follow your doctor s orders about which medicines to take and which to stop until after surgery.      Eating and drinking prior to surgery: follow the instructions from your surgeon.      Take a shower or bath the night before surgery. Use the soap your surgeon gave you to gently clean your skin. If you do not have soap from your surgeon, use your regular soap. Do not shave or scrub the surgery site. Wear clean pajamas and have clean sheets on your bed.             Hold all supplements, aspirin and NSAIDs including Naprosyn for 7 days prior to surgery.    Follow your surgeon's direction on when to stop eating and drinking prior to surgery.    Your surgeon will be managing your pain after your surgery.      Remove all jewelry and metal piercings before your surgery.     Remove nail polish from fingers before surgery.    Take your usual dose of omeprazole and citalopram on the morning of surgery.  Hold other medications including lisinopril.    Bring your mouthguard with you during hospitalization

## 2021-06-05 VITALS
HEART RATE: 82 BPM | SYSTOLIC BLOOD PRESSURE: 122 MMHG | HEIGHT: 62 IN | WEIGHT: 201 LBS | BODY MASS INDEX: 36.99 KG/M2 | DIASTOLIC BLOOD PRESSURE: 80 MMHG | TEMPERATURE: 96.8 F | OXYGEN SATURATION: 97 %

## 2021-06-07 NOTE — TELEPHONE ENCOUNTER
Refill Approved    Rx renewed per Medication Renewal Policy. Medication was last renewed on 04/08/2019    Radha Metz, [facility] [place] Connection Triage/Med Refill 4/10/2020     Requested Prescriptions   Pending Prescriptions Disp Refills     lisinopriL (PRINIVIL,ZESTRIL) 10 MG tablet [Pharmacy Med Name: LISINOPRIL 10 MG TABLET] 90 tablet 3     Sig: TAKE 1 TABLET BY MOUTH EVERY DAY       Ace Inhibitors Refill Protocol Passed - 4/10/2020 12:09 AM        Passed - PCP or prescribing provider visit in past 12 months       Last office visit with prescriber/PCP: Visit date not found OR same dept: 10/29/2019 Shavonne Knutson MD OR same specialty: 10/29/2019 Shavonne Knutson MD  Last physical: Visit date not found Last MTM visit: Visit date not found   Next visit within 3 mo: Visit date not found  Next physical within 3 mo: Visit date not found  Prescriber OR PCP: Charles Dasilva MD  Last diagnosis associated with med order: 1. Essential hypertension  - lisinopriL (PRINIVIL,ZESTRIL) 10 MG tablet [Pharmacy Med Name: LISINOPRIL 10 MG TABLET]; TAKE 1 TABLET BY MOUTH EVERY DAY  Dispense: 90 tablet; Refill: 3    If protocol passes may refill for 12 months if within 3 months of last provider visit (or a total of 15 months).             Passed - Serum Potassium in past 12 months     Lab Results   Component Value Date    Potassium 4.6 01/06/2020             Passed - Blood pressure filed in past 12 months     BP Readings from Last 1 Encounters:   01/06/20 128/80             Passed - Serum Creatinine in past 12 months     Creatinine   Date Value Ref Range Status   01/06/2020 0.97 0.60 - 1.10 mg/dL Final

## 2021-06-08 NOTE — TELEPHONE ENCOUNTER
Refill Approved    Rx renewed per Medication Renewal Policy. Medication was last renewed on 5/23/19.    Leticia Batista, [facility] Triage/Med Refill 6/2/2020     Requested Prescriptions   Pending Prescriptions Disp Refills     atorvastatin (LIPITOR) 20 MG tablet [Pharmacy Med Name: ATORVASTATIN 20 MG TABLET] 90 tablet 3     Sig: TAKE 1 TABLET BY MOUTH EVERY DAY       Statins Refill Protocol (Hmg CoA Reductase Inhibitors) Passed - 5/29/2020 12:30 AM        Passed - PCP or prescribing provider visit in past 12 months      Last office visit with prescriber/PCP: 10/26/2017 Ho Quezada MD OR same dept: 10/29/2019 Shavonne Knutson MD OR same specialty: 10/29/2019 Shavonne Knutson MD  Last physical: 1/6/2020 Last MTM visit: Visit date not found   Next visit within 3 mo: Visit date not found  Next physical within 3 mo: Visit date not found  Prescriber OR PCP: Ho Quezada MD  Last diagnosis associated with med order: 1. Hyperlipidemia LDL goal <100  - atorvastatin (LIPITOR) 20 MG tablet [Pharmacy Med Name: ATORVASTATIN 20 MG TABLET]; TAKE 1 TABLET BY MOUTH EVERY DAY  Dispense: 90 tablet; Refill: 3    If protocol passes may refill for 12 months if within 3 months of last provider visit (or a total of 15 months).

## 2021-06-08 NOTE — PROGRESS NOTES
Preoperative Consultation   Kandi iKm   61 y.o.  female    Date of visit: 2/16/2017  Physician: Ho Quezada MD    This is a preoperative consultation requested by Dr. Melgar in preparation for ELAN/BSO and rectocele repair on February 21, 2017 at [facility], fax [phone].       Assessment and Plan   Kandi Kim was seen in preoperative consultation in preparation for EALN/BSO and rectocele repair.  This is a intermediate risk surgery and the patient has no increased risk for major cardiac complications.   No history of coronary artery disease or chronic respiratory problems.  No limitations on exercise.  No exertional chest pain or chronic dyspnea.  Excellent control of blood pressure.  She has experienced nausea with general anesthesia.  She has no personal or family history of DVT or pulmonary embolus.    She will remain off of aspirin and NSAIDs for 1 week prior to surgery.  She will take her lisinopril with a small sip of water on the morning of surgery.  Anesthesia should be notified of her history of nausea following general anesthesia.  DVT prophylaxis per usual protocol including  pneumoboots and subcutaneous heparin with early ambulation.    1. Pre-op exam    2. Rectocele    3. Essential hypertension    4. HONEY (obstructive sleep apnea)         Patient Profile   Social History     Social History Narrative    , [place]     1 child    3M            Past Medical History   Patient Active Problem List   Diagnosis     HTN (hypertension)     Hypothyroidism     Hyperlipidemia     HONEY (obstructive sleep apnea)     Postmenopausal symptoms     Migraines     Osteoarthritis     Chronic sinusitis     Rectocele       Past Surgical History  She has a past surgical history that includes Bunionectomy; Tubal ligation; Knee Arthroplasty (Right, 04/2016); Nephrectomy (Left); Middle ear surgery; Colonoscopy; and Knee arthroscopy (Bilateral, 2011 2014).     History of Present Illness    This 61 y.o. old woman with history of hypertension and obstructive sleep apnea who was scheduled to have a ELAN/BSO with rectocele repair next week.  She is here for preop evaluation.  Her blood pressure remains under excellent control.  She has no history of coronary artery disease or chronic respiratory problems.  No exertional chest pain or dyspnea.  No limitations on exercise.  She has experienced nausea following general anesthesia.  No family or personal history of DVT or pulmonary embolus.     Recent antiplatelet use: no  Personal or family history of bleeding or clotting disorders: no  Steroid use in the past year: no  Personal or family history of difficulty with anesthesia: yes Nausea postoperatively  Current cardiopulmonary symptoms: no      Review of Systems: A comprehensive review of systems was negative except as noted.     Medications and Allergies   Current Outpatient Prescriptions   Medication Sig Dispense Refill     aspirin 81 MG EC tablet Take 81 mg by mouth daily.       atorvastatin (LIPITOR) 20 MG tablet TAKE 1 TABLET EVERY DAY 90 tablet 3     citalopram (CELEXA) 20 MG tablet TAKE 1 TABLET (20 MG TOTAL) BY MOUTH DAILY. 90 tablet 3     levothyroxine (SYNTHROID, LEVOTHROID) 125 MCG tablet TAKE 1 TABLET BY MOUTH EVERYDAY 90 tablet 3     lisinopril (PRINIVIL,ZESTRIL) 10 MG tablet Take 1 tablet (10 mg total) by mouth daily. 90 tablet 3     fluticasone (FLONASE) 50 mcg/actuation nasal spray 2 sprays into each nostril daily. 16 g 2     No current facility-administered medications for this visit.      No Known Allergies     Family and Social History   Family History   Problem Relation Age of Onset     Stroke Mother      Breast cancer Sister         Social History   Substance Use Topics     Smoking status: Never Smoker     Smokeless tobacco: None     Alcohol use Yes      Comment: infrequent        Physical Exam   General Appearance:   Well-appearing middle-aged woman    Visit Vitals     /74  "(Patient Site: Right Arm, Patient Position: Sitting, Cuff Size: Adult Large)     Pulse 61     Ht 5' 2\" (1.575 m)     Wt 193 lb (87.5 kg)     LMP  (LMP Unknown)     SpO2 98%     BMI 35.3 kg/m2       HEENT: Normal  RESPIRATORY: Breathing pattern was normal and the chest moved symmetrically.  Percussion/auscultatory percussion was normal.  Lung sounds were normal and there were no abnormal sounds.  CARDIOVASCULAR: Heart rate and rhythm were normal.  S1 and S2 were normal and there were no extra sounds or murmurs. Peripheral pulses in arms and legs were normal.  Jugular venous pressure was normal.  There was no peripheral edema.  No carotid bruits.  SKIN/HAIR/NAILS: No cyanosis or pallor  NEUROLOGIC: Grossly nonfocal  PSYCHIATRIC:  Mood and affect were normal and the patient had normal recent and remote memory. The patient's judgment and insight were normal.         Additional Tests   Laboratory: Hemoglobin 13.9      Total time spent with the patient today was 30 minutes of which > 50% was spent in counseling and coordination of care     Ho Quezada MD  Internal Medicine  Contact me at [phone]  "

## 2021-06-08 NOTE — PROGRESS NOTES
[facility] Follow Up Note    Kandi Kim   61 y.o. female    Date of Visit: 1/4/2017    Chief Complaint   Patient presents with     Sinusitis     sinus pressure, teeth hurt, headaches     Subjective  Kandi is a patient Dr. Ho Pereira coming in for an acute sinus infection.    She has a history of sinus infections, but not for many years.  She is a nonsmoker.  No lung disease or asthma.  No chronic sinusitis, but she has used Flonase in the past but not currently.    She had a short case of stomach flu over [place] the past quickly.  No further GI symptoms now.    3 days ago patient had acute onset nasal congestion with nasal drainage, pain in the bilateral maxillary sinus area.  Upper teeth hurt.  Minimal cough, nonproductive.  No fevers or shortness of breath.  Minor ear congestion without pain.    She was treated for diverticulitis in August.  She denies a history of C. diff colitis.    No known sick contacts or significant travel.    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     Migraines      HONEY (obstructive sleep apnea)      Osteoarthritis      knees, hands     Postmenopausal symptoms      Primary osteoarthritis of right knee 04/12/2016     R TKA     Rectal bleeding 8/9/2016     Rectocele 11/11/2016     Surgery is planned     Screening      DEXA normal     Situational anxiety      PSHx:    Past Surgical History   Procedure Laterality Date     Bunionectomy       Tubal ligation       Knee arthroplasty Right 04/2016     Nephrectomy Left      Kidney donor     Middle ear surgery       Colonoscopy       Normal 2008     Knee arthroscopy Bilateral 2011 2014     Immunizations:   Immunization History   Administered Date(s) Administered     Influenza, inj, historic 09/26/2016     Tdap 01/03/2013  "      ROS A comprehensive review of systems was performed and was otherwise negative    Medications, allergies, and problem list were reviewed and updated    Exam  Visit Vitals     /78     Pulse 74     Temp 98.3  F (36.8  C)     Ht 5' 2\" (1.575 m)     Wt 194 lb (88 kg)     LMP  (LMP Unknown)     SpO2 98%     BMI 35.48 kg/m2     She appears mildly ill.  Tympanic membranes within normal limits.  Minimal pharyngitis no exudate.  No JVD.  Lungs are clear.    Assessment/Plan  1. Acute sinus infection  Acute sinusitis, likely complication of viral URI.  Treat with Flonase and Augmentin.  I did discuss risk of antibiotics including diarrhea and C. diff colitis.  Patient except these risks and wishes to proceed.    Patient should be feeling better over the next few days and much better by next week.  He was told if any worsening, or if not better by next week to return for further evaluation.  - fluticasone (FLONASE) 50 mcg/actuation nasal spray; 2 sprays into each nostril daily.  Dispense: 16 g; Refill: 2  - amoxicillin-clavulanate (AUGMENTIN) 875-125 mg per tablet; Take 1 tablet by mouth 2 (two) times a day for 10 days.  Dispense: 20 tablet; Refill: 1    Return if symptoms worsen or fail to improve.   There are no Patient Instructions on file for this visit.  Suleiman Chou MD        Current Outpatient Prescriptions   Medication Sig Dispense Refill     aspirin 81 MG EC tablet Take 81 mg by mouth daily.       atorvastatin (LIPITOR) 20 MG tablet Take 20 mg by mouth bedtime.       citalopram (CELEXA) 20 MG tablet Take 1 tablet (20 mg total) by mouth daily. 90 tablet 3     levothyroxine (SYNTHROID, LEVOTHROID) 125 MCG tablet TAKE 1 TABLET BY MOUTH EVERYDAY 90 tablet 3     lisinopril (PRINIVIL,ZESTRIL) 10 MG tablet Take 1 tablet (10 mg total) by mouth daily. 90 tablet 3     amoxicillin-clavulanate (AUGMENTIN) 875-125 mg per tablet Take 1 tablet by mouth 2 (two) times a day for 10 days. 20 tablet 1     fluticasone " (FLONASE) 50 mcg/actuation nasal spray 2 sprays into each nostril daily. 16 g 2     No current facility-administered medications for this visit.      No Known Allergies  Social History   Substance Use Topics     Smoking status: Never Smoker     Smokeless tobacco: None     Alcohol use Yes      Comment: infrequent

## 2021-06-09 NOTE — ANESTHESIA POSTPROCEDURE EVALUATION
Patient: Kandi Kim  TOTAL VAGINAL HYSTERECTOMY, BILATERAL SALPINGO-OOPHORECTOMY, POSTERIOR COLPORRHAPHY  Anesthesia type: general    Patient location: PACU  Last vitals:   Vitals:    02/21/17 1010   BP: 154/79   Pulse: (!) 107   Resp: 19   Temp:    SpO2: 100%     Post vital signs: stable  Level of consciousness: awake and responds to simple questions  Post-anesthesia pain: pain controlled  Post-anesthesia nausea and vomiting: no  Pulmonary: unassisted, return to baseline  Cardiovascular: stable and blood pressure at baseline  Hydration: adequate  Anesthetic events: no    QCDR Measures:  ASA# 11 - Cynthia-op Cardiac Arrest: ASA11B - Patient did NOT experience unanticipated cardiac arrest  ASA# 12 - Cynthia-op Mortality Rate: ASA12B - Patient did NOT die  ASA# 13 - PACU Re-Intubation Rate: ASA13B - Patient did NOT require a new airway mgmt  ASA# 10 - Composite Anes Safety: ASA10A - No serious adverse event  ASA# 38 - New Corneal Injury: ASA38A - No new exposure keratitis or corneal abrasion in PACU    Additional Notes:

## 2021-06-09 NOTE — TELEPHONE ENCOUNTER
Refill Approved    Rx renewed per Medication Renewal Policy. Medication was last renewed on 7/8/2019.    Esetla Page, [facility] Triage/Med Refill 7/2/2020     Requested Prescriptions   Pending Prescriptions Disp Refills     levothyroxine (SYNTHROID, LEVOTHROID) 125 MCG tablet [Pharmacy Med Name: LEVOTHYROXINE 125 MCG TABLET] 90 tablet 3     Sig: TAKE 1 TABLET (125 MCG TOTAL) BY MOUTH DAILY AT 6:00 AM.       Thyroid Hormones Protocol Passed - 7/2/2020 12:13 AM        Passed - Provider visit in past 12 months or next 3 months     Last office visit with prescriber/PCP: 10/26/2017 Ho Quezada MD OR same dept: 10/29/2019 Shavonne Knutson MD OR same specialty: 10/29/2019 Shavonne Knutson MD  Last physical: 1/6/2020 Last MTM visit: Visit date not found   Next visit within 3 mo: Visit date not found  Next physical within 3 mo: Visit date not found  Prescriber OR PCP: Ho Quezada MD  Last diagnosis associated with med order: 1. Hypothyroidism  - levothyroxine (SYNTHROID, LEVOTHROID) 125 MCG tablet [Pharmacy Med Name: LEVOTHYROXINE 125 MCG TABLET]; Take 1 tablet (125 mcg total) by mouth Daily at 6:00 am.  Dispense: 90 tablet; Refill: 3    If protocol passes may refill for 12 months if within 3 months of last provider visit (or a total of 15 months).             Passed - TSH on file in past 12 months for patient age 12 & older     TSH   Date Value Ref Range Status   08/27/2019 3.03 0.30 - 5.00 uIU/mL Final

## 2021-06-09 NOTE — TELEPHONE ENCOUNTER
Refill Approved    Rx renewed per Medication Renewal Policy. Medication was last renewed on 4/1/20.    Leticia Batista, [facility] Triage/Med Refill 6/22/2020     Requested Prescriptions   Pending Prescriptions Disp Refills     citalopram (CELEXA) 20 MG tablet [Pharmacy Med Name: CITALOPRAM HBR 20 MG TABLET] 90 tablet 0     Sig: TAKE 1 TABLET BY MOUTH EVERY DAY       [facility] Refill Protocol  Passed - 6/22/2020 12:10 AM        Passed - PCP or prescribing provider visit in last year     Last office visit with prescriber/PCP: 10/26/2017 Ho Quezada MD OR same dept: 10/29/2019 Shavonne Knutson MD OR same specialty: 10/29/2019 Shavonne Knutson MD  Last physical: 1/6/2020 Last MTM visit: Visit date not found   Next visit within 3 mo: Visit date not found  Next physical within 3 mo: Visit date not found  Prescriber OR PCP: Ho Quezada MD  Last diagnosis associated with med order: 1. Depression, unspecified depression type  - citalopram (CELEXA) 20 MG tablet [Pharmacy Med Name: CITALOPRAM HBR 20 MG TABLET]; TAKE 1 TABLET BY MOUTH EVERY DAY  Dispense: 90 tablet; Refill: 0    If protocol passes may refill for 12 months if within 3 months of last provider visit (or a total of 15 months).

## 2021-06-09 NOTE — ANESTHESIA PREPROCEDURE EVALUATION
Anesthesia Evaluation      Patient summary reviewed   History of anesthetic complications (PONV)     Airway   Mallampati: I  Neck ROM: full   Pulmonary - normal exam   (+) sleep apnea on other, moderate,                          Cardiovascular - negative ROS and normal exam  (+) hypertension well controlled, , hypercholesterolemia,     ECG reviewed        Neuro/Psych - negative ROS     Endo/Other - negative ROS   (+) hypothyroidism, arthritis,      GI/Hepatic/Renal - negative ROS           Dental - normal exam                        Anesthesia Plan  Planned anesthetic: general endotracheal    ASA 2   Induction: intravenous   Anesthetic plan and risks discussed with: patient    Post-op plan: routine recovery

## 2021-06-09 NOTE — ANESTHESIA CARE TRANSFER NOTE
Last vitals:   Vitals:    02/21/17 0952   BP: 155/87   Pulse: (!) 123   Resp: 14   Temp: 36.6  C (97.8  F)   SpO2: 95%     Patient's level of consciousness is awake  Spontaneous respirations: yes  Maintains airway independently: yes  Dentition unchanged: yes  Oropharynx: oropharynx clear of all foreign objects    QCDR Measures:  ASA# 20 - Surgical Safety Checklist: ASA20A - Safety Checks Done  PQRS# 430 - Adult PONV Prevention: 4558F - Pt received => 2 anti-emetic agents (different classes) preop & intraop  ASA# 8 - Peds PONV Prevention: NA - Not pediatric patient, not GA or 2 or more risk factors NOT present  PQRS# 424 - Cynthia-op Temp Management: 4559F - At least one body temp DOCUMENTED => 35.5C or 95.9F within required timeframe  PQRS# 426 - PACU Transfer Protocol:NA - Patient did not go to PACU  ASA# 14 - Acute Post-op Pain: ASA14B - Patient did NOT experience pain >= 7 out of 10    I completed my SBAR handoff to the receiving nurse per policy and procedure.

## 2021-06-10 NOTE — PROGRESS NOTES
Office Visit   Kandi Kim   65 y.o. female    Date of Visit: 8/11/2020    Chief Complaint   Patient presents with     Abdominal Pain     Diarrhea, gas for a couple weeks        Assessment and Plan   1. Diarrhea, unspecified type  She has been having diarrhea now work including liver tests, thyroid and CBC.  We will get a urine test and send her stool for culture and C. difficile.  I discussed the brat diet with her today.  She will start with a very bland diet.  If she starts to develop worsening abdominal pain then we would maybe need to consider a CT scan and she will let me know.  Also if her C. difficile is negative we did discuss starting Imodium.  She is in agreement with this plan.  - HM2(CBC w/o Differential)  - Thyroid Carson  - Comprehensive Metabolic Panel  - Urinalysis-UC if Indicated  - Culture, Stool; Future  - C. difficile Toxigenic by PCR; Future    2. Abdominal pain, generalized  Her exam is benign today.  I think the pain is more when she needs to have a bowel movement.  If it starts to be more constant again we may need to get a CT scan to look for diverticulitis.  - HM2(CBC w/o Differential)  - Thyroid Carson  - Comprehensive Metabolic Panel  - Urinalysis-UC if Indicated  - Culture, Stool; Future  - C. difficile Toxigenic by PCR; Future    3. Esophageal dysphagia  - omeprazole (PRILOSEC OTC) 20 MG tablet; Take 1 tablet (20 mg total) by mouth daily before breakfast.  Dispense: 90 tablet; Refill: 3         No follow-ups on file.     History of Present Illness   This 65 y.o. old female comes in due to diarrhea and abdominal discomfort.  The last 3 to 4 weeks she has been having episodes of loose watery stools.  She gets a lot of abdominal cramping.  It was worse when it started and was for several days ago.  She did not have any fevers or chills or other symptoms of infection.  She will have some days without too much diarrhea than others that are worse.  Yesterday was a bad day and she  "had multiple loose watery stools.  He had a decreased appetite.  She gets some abdominal pain that seems to improve after a bowel movement.  No vomiting.  Some mild nausea.  Again no fevers or chills.    Review of Systems: As above, systems otherwise reviewed and negative.     Medications, Allergies and Problem List   Patient Active Problem List   Diagnosis     HTN (hypertension)     Hypothyroidism     Hyperlipidemia     HONEY (obstructive sleep apnea)     Postmenopausal symptoms     Migraines     Osteoarthritis     Chronic sinusitis     History of colon polyps     Acute torn meniscus of knee, left, sequela     GERD with esophagitis     Primary osteoarthritis of left knee     Current Outpatient Medications   Medication Sig Dispense Refill     aspirin 81 MG EC tablet Take 81 mg by mouth daily.       atorvastatin (LIPITOR) 20 MG tablet TAKE 1 TABLET BY MOUTH EVERY DAY 90 tablet 2     citalopram (CELEXA) 20 MG tablet TAKE 1 TABLET BY MOUTH EVERY DAY 90 tablet 2     levothyroxine (SYNTHROID, LEVOTHROID) 125 MCG tablet TAKE 1 TABLET (125 MCG TOTAL) BY MOUTH DAILY AT 6:00 AM. 90 tablet 1     lisinopriL (PRINIVIL,ZESTRIL) 10 MG tablet TAKE 1 TABLET BY MOUTH EVERY DAY 90 tablet 3     omeprazole (PRILOSEC OTC) 20 MG tablet Take 1 tablet (20 mg total) by mouth daily before breakfast. 90 tablet 3     No current facility-administered medications for this visit.      No Known Allergies       Physical Exam     /80 (Patient Site: Left Arm, Patient Position: Sitting)   Pulse (!) 104   Ht 5' 2\" (1.575 m)   Wt 197 lb (89.4 kg)   LMP  (LMP Unknown)   SpO2 97%   BMI 36.03 kg/m      General:  Patient is alert and in no apparent distress.  Cardiovascular: Slight tachycardia but regular rhythm, normal S1/S2, no murmurs, rubs, or gallop.  Pulmonary:  Lungs are clear to auscultation bilaterally with normal respiratory effort.  Gastrointestinal:  Abdomen is soft, non-tender, non-distended, with no organomegaly, rebound or " yajaira.           Additional Information   Social History     Tobacco Use     Smoking status: Never Smoker     Smokeless tobacco: Never Used   Substance Use Topics     Alcohol use: Yes     Comment: infrequent     Drug use: No            Shavonne Knutson MD

## 2021-06-10 NOTE — TELEPHONE ENCOUNTER
Left detailed message for the patient relaying message below from the lab regarding the requested kit.  Asked that she call with any further questions.  Lucrecia HU CMA/EDWIN....................12:09 PM

## 2021-06-13 NOTE — TELEPHONE ENCOUNTER
RN cannot approve Refill Request    RN can NOT refill this medication med is not covered by policy/route to provider. Last office visit: 10/26/2017 Ho Quezada MD Last Physical: 1/6/2020 Last MTM visit: Visit date not found Last visit same specialty: Visit date not found.  Next visit within 3 mo: Visit date not found  Next physical within 3 mo: Visit date not found      Leticia Batista, [facility] Connection Triage/Med Refill 12/10/2020    Requested Prescriptions   Pending Prescriptions Disp Refills     levothyroxine (SYNTHROID, LEVOTHROID) 125 MCG tablet [Pharmacy Med Name: LEVOTHYROXINE 125 MCG TABLET] 90 tablet 0     Sig: TAKE 1 TABLET (125 MCG TOTAL) BY MOUTH DAILY AT 6:00 AM.       There is no refill protocol information for this order

## 2021-06-13 NOTE — PROGRESS NOTES
Office Visit - Follow Up   Kandi Kim   62 y.o. female    Date of Visit: 10/26/2017    Chief Complaint   Patient presents with     Neck Pain     left side     ear pain     left        Assessment and Plan   1. Fever and chills associated with left-sided neck pain and recent ear pain.  Unclear etiology of what sounds like infectious process.  May have been insect/spider bite.  Cannot exclude sinus infection or otitis.  May now have some mild lymphadenitis.  Will check CBC and begin doxycycline 100 mg twice daily for 10 days.  - HM1(CBC and Differential)      No Follow-up on file.     History of Present Illness   This 62 y.o. old woman with history of hypertension is here with pain involving the left side of her neck that began 10 days ago.  There is some associated fevers and chills.  She also had left ear pain for a couple days and a slight sore throat.  She felt a lump in her neck which has since resolved.  Unsure if this was an insect bite.  Even 10 days after onset of symptoms, the left side of her neck remains sore.  It hurts to touch.  Denies any cough or dyspnea.  Left ear pain has resolved.  She was traveling in [place] when symptoms started.    Review of Systems:  Otherwise, a comprehensive review of systems was negative except as noted.     Medications, Allergies and Problem List   Patient Active Problem List   Diagnosis     HTN (hypertension)     Hypothyroidism     Hyperlipidemia     HONEY (obstructive sleep apnea)     Postmenopausal symptoms     Migraines     Osteoarthritis     Chronic sinusitis     Rectocele     Fever and chills     Left ear pain     Neck pain on left side       She has a past surgical history that includes Bunionectomy; Tubal ligation; Knee Arthroplasty (Right, 04/2016); Nephrectomy (Left); Middle ear surgery; Colonoscopy; Knee arthroscopy (Bilateral, 2011 2014); Vaginal hysterectomy (N/A, 2/21/2017); and Colporrhaphy (N/A, 2/21/2017).    No Known Allergies    Current  "Outpatient Prescriptions   Medication Sig Dispense Refill     acetaminophen (TYLENOL) 500 MG tablet Take 1,000 mg by mouth every 6 (six) hours as needed for pain.       aspirin 81 MG EC tablet Take 81 mg by mouth daily.       atorvastatin (LIPITOR) 20 MG tablet Take 20 mg by mouth bedtime.       citalopram (CELEXA) 20 MG tablet Take 20 mg by mouth daily.       levothyroxine (SYNTHROID, LEVOTHROID) 125 MCG tablet TAKE 1 TABLET BY MOUTH EVERYDAY 90 tablet 3     lisinopril (PRINIVIL,ZESTRIL) 10 MG tablet TAKE 1 TABLET (10 MG TOTAL) BY MOUTH DAILY. 90 tablet 3     doxycycline (VIBRA-TABS) 100 MG tablet Take 1 tablet (100 mg total) by mouth 2 (two) times a day for 10 days. 20 tablet 0     No current facility-administered medications for this visit.         Physical Exam   General Appearance:   Well-appearing middle-aged woman    /70 (Patient Site: Left Arm, Patient Position: Sitting, Cuff Size: Adult Regular)  Pulse 71  Ht 5' 2\" (1.575 m)  Wt 184 lb (83.5 kg)  LMP  (LMP Unknown)  SpO2 98%  BMI 33.65 kg/m2    HEENT: Normal, TMs normal, oropharynx normal no erythema or lesions  Neck without any lymphadenopathy or masses  Respiratory: Normal respiratory effort.  Lungs are clear with no rales or wheezes.  Heart: Regular rate and rhythm   Skin without rashes or lesions         Additional Information   Social History   Substance Use Topics     Smoking status: Never Smoker     Smokeless tobacco: Never Used     Alcohol use Yes      Comment: ezio Quezada MD  "

## 2021-06-15 NOTE — TELEPHONE ENCOUNTER
Refill Request  Medication name:   Requested Prescriptions     Pending Prescriptions Disp Refills     levothyroxine (SYNTHROID, LEVOTHROID) 125 MCG tablet 90 tablet 0     Sig: Take 1 tablet (125 mcg total) by mouth Daily at 6:00 am.     Who prescribed the medication: pcp  Last refill on medication: 12/11/2020  Requested Pharmacy: [facility]  Last appointment with PCP: 1/6/2020 - 8/11/2020 with Eamon  Next appointment: Appointment scheduled for N/A    Ashley Conway CMA    Last TSH 8/11/2020

## 2021-06-15 NOTE — TELEPHONE ENCOUNTER
Refill Request  Medication name:   Requested Prescriptions     Pending Prescriptions Disp Refills     lisinopriL (PRINIVIL,ZESTRIL) 10 MG tablet 90 tablet 3     Sig: Take 1 tablet (10 mg total) by mouth daily.     Who prescribed the medication: Dr. Blair  Last refill on medication: 4/10/2020  Requested Pharmacy: [facility]  Last appointment with PCP: Not found  Next appointment: Appointment scheduled for 3/26/21    Bronwyn Sanchez [facility]

## 2021-06-15 NOTE — PROGRESS NOTES
Office Visit - Physical   Kandi Kim   62 y.o.  female    Date of visit: 2/8/2018  Physician: Ho Quezada MD     Assessment and Plan   1. Routine general medical examination at a health care facility  Immunizations are reviewed and everything is up-to-date.  Living will discussed.  Non-smoker.  Uses alcohol in moderation.  Regular exercise discussed.  Up to date with colonoscopies and this should be repeated in August of this year.  She will follow-up with her OB/GYN and continue mammograms annually.  Last DEXA was 2016 and was normal and this should be repeated 2 years.  Continue annual eye exams and seeing dentist.  Skin exam performed and recommending regular use of sunblock.  Hepatitis C antibody for screening.  Will screen for diabetes with fasting glucose.      2. Essential hypertension  Excellent blood pressure control with current dose of lisinopril  - Basic Metabolic Panel  - Urinalysis  - Hemoglobin    3. Hyperlipidemia  Recheck lipid profile on atorvastatin and monitor LFTs.  Continue aspirin 81 mg daily  - Lipid Cascade  - Hepatic Profile    4. Hypothyroidism  Recheck TSH and adjust dose of Synthroid  - Thyroid Stimulating Hormone (TSH)    5. Acute torn meniscus of knee, left, initial encounter  Injury to left knee found to have torn meniscus on MRI.  Already had cortisone injection with only temporary partial improvement.  Deciding between surgery and repeating cortisone injection.  Being evaluated by orthopedics    6. Cough with fever  High suspicion for influenza.  Checking swab and consider starting Tamiflu    Negative, treated with Z-Terry and Robitussin AC  - Influenza A/B Rapid Test    7. Osteoarthritis  History of right total knee arthroplasty, doing well    8. Encounter for hepatitis C screening test for low risk patient    - Hepatitis C Antibody (Anti-HCV)    Return in about 1 year (around 2/8/2019) for Annual physical.     Chief Complaint   Chief Complaint   Patient presents  with     Annual Exam     Cough     Nasal Congestion        Patient Profile   Social History     Social History Narrative    , [place]     1 child    3M            Past Medical History   Patient Active Problem List   Diagnosis     HTN (hypertension)     Hypothyroidism     Hyperlipidemia     HONEY (obstructive sleep apnea)     Postmenopausal symptoms     Migraines     Osteoarthritis     Chronic sinusitis     Acute torn meniscus of knee, left, initial encounter       Past Surgical History  She has a past surgical history that includes Bunionectomy; Tubal ligation; Knee Arthroplasty (Right, 04/2016); Nephrectomy (Left); Middle ear surgery; Colonoscopy; Knee arthroscopy (Bilateral, 2011 2014); Vaginal hysterectomy (N/A, 2/21/2017); and Colporrhaphy (N/A, 2/21/2017).     History of Present Illness   This 62 y.o. old woman is here for annual physical and to follow-up chronic medical problems.  Additionally, recent sinus infection treated with Augmentin for 10 days.  Symptoms resolved.  However, she has now developed increasing cough associated with body aches and feels feverish.  Generalized malaise.  She did have her flu shot this year.  No dyspnea or wheezing.  No nausea.    Recent injury to left knee found to have torn meniscus on MRI.  Had cortisone injection with only temporary, partial improvement.  Deciding between repeating cortisone injection and possible surgery.    Review of Systems: A comprehensive review of systems was negative except as noted.  General: No chronic fatigue, unexpected weight loss or weight gain, fevers, chills, or night sweats  Eyes: No significant change in vision.  Seeing ophthalmologist regularly.  ENT: No ear or sinus infections.  No change in hearing.  No tinnitus.  Respiratory: No wheezing, dyspnea on exertion, or chronic cough  Cardiovascular: No chest pain, palpitations, dizziness, or syncope.  No peripheral edema.  GI: No abdominal pain, reflux symptoms, dysphagia, nausea,  "vomiting, constipation, or diarrhea  : No change in frequency or incontinence.  No hematuria.  Skin: No new rashes or lesions.  Neurologic: No headaches, seizures, dizziness, weakness, or numbness.  Musculoskeletal: Left knee pain  Lymphatic: No swollen lymph nodes  Psychiatric: No depression, anxiety, or sleep disorder.       Medications and Allergies   Current Outpatient Prescriptions   Medication Sig Dispense Refill     acetaminophen (TYLENOL) 500 MG tablet Take 1,000 mg by mouth every 6 (six) hours as needed for pain.       aspirin 81 MG EC tablet Take 81 mg by mouth daily.       atorvastatin (LIPITOR) 20 MG tablet TAKE 1 TABLET EVERY DAY 90 tablet 3     citalopram (CELEXA) 20 MG tablet TAKE 1 TABLET (20 MG TOTAL) BY MOUTH DAILY. 90 tablet 3     levothyroxine (SYNTHROID, LEVOTHROID) 125 MCG tablet TAKE 1 TABLET BY MOUTH EVERYDAY 90 tablet 3     lisinopril (PRINIVIL,ZESTRIL) 10 MG tablet TAKE 1 TABLET (10 MG TOTAL) BY MOUTH DAILY. 90 tablet 3     No current facility-administered medications for this visit.      No Known Allergies     Family and Social History   Family History   Problem Relation Age of Onset     Stroke Mother      Breast cancer Sister         Social History   Substance Use Topics     Smoking status: Never Smoker     Smokeless tobacco: Never Used     Alcohol use Yes      Comment: infrequent        Physical Exam   General Appearance:   Well-appearing middle-aged woman    /70 (Patient Site: Left Arm, Patient Position: Sitting, Cuff Size: Adult Large)  Pulse 85  Temp 98.2  F (36.8  C)  Ht 5' 2\" (1.575 m)  Wt 185 lb (83.9 kg)  LMP  (LMP Unknown)  SpO2 98%  BMI 33.84 kg/m2    EYES: Eyelids, conjunctiva, and sclera were normal. Pupils were normal. Cornea, iris, and lens were normal bilaterally.  HEAD, EARS, NOSE, MOUTH, AND THROAT: Head and face were normal. Nose appearance was normal and there was no discharge. Oropharynx was normal.  NECK: Neck appearance was normal. There were no neck " masses and the thyroid was not enlarged and no nodules are felt.  No lymphadenopathy.  RESPIRATORY: Breathing pattern was normal and the chest moved symmetrically.  Percussion/auscultatory percussion was normal.  Lung sounds were normal and there were no rales or wheezes.  CARDIOVASCULAR: Heart rate and rhythm were normal.  S1 and S2 were normal and there were no extra sounds or murmurs. Peripheral pulses in arms and legs were normal.  Jugular venous pressure was normal.  There was no peripheral edema.  No carotid bruits.  GASTROINTESTINAL: The abdomen was normal in contour.  Bowel sounds were present.  Percussion detected no organ enlargement or tenderness.  Palpation detected no tenderness, mass, or enlarged organs.   MUSCULOSKELETAL: Skeletal configuration was normal and muscle mass was normal for age. Joint appearance was overall normal.  LYMPHATIC: There were no enlarged nodes.  SKIN/HAIR/NAILS: Skin color was normal.  There were no skin lesions.  Hair and nails were normal.  NEUROLOGIC: The patient was alert and oriented to person, place, time, and circumstance. Speech was normal. Cranial nerves were normal. Motor strength was normal for age. The patient was normally coordinated.  Sensation intact.  PSYCHIATRIC:  Mood and affect were normal and the patient had normal recent and remote memory. The patient's judgment and insight were normal.       Additional Information        Ho Quezada MD  Internal Medicine  Contact me at [phone]

## 2021-06-15 NOTE — TELEPHONE ENCOUNTER
LMOM that rx was refilled and that she should make an appt for a physical before the end of May.  Cindy Irene, CMA

## 2021-06-15 NOTE — TELEPHONE ENCOUNTER
Please contact Kandi on Monday and remind her that she is overdue for annual physical.  I refilled 3 months of her Synthroid.  I would like her to schedule her appointment before the end of May.

## 2021-06-15 NOTE — TELEPHONE ENCOUNTER
Refill Approved    Rx renewed per Medication Renewal Policy. Medication was last renewed on 6/2/20.    Papa Fernando, [facility] Triage/Med Refill 3/7/2021     Requested Prescriptions   Pending Prescriptions Disp Refills     atorvastatin (LIPITOR) 20 MG tablet [Pharmacy Med Name: ATORVASTATIN 20 MG TABLET] 90 tablet 2     Sig: TAKE 1 TABLET BY MOUTH EVERY DAY       Statins Refill Protocol (Hmg CoA Reductase Inhibitors) Passed - 3/6/2021 11:15 AM        Passed - PCP or prescribing provider visit in past 12 months      Last office visit with prescriber/PCP: 10/26/2017 Ho Quezada MD OR same dept: 8/11/2020 Shavonne Knutson MD OR same specialty: 8/11/2020 Shavonne Knutson MD  Last physical: 1/6/2020 Last MTM visit: Visit date not found   Next visit within 3 mo: Visit date not found  Next physical within 3 mo: Visit date not found  Prescriber OR PCP: Ho Quezada MD  Last diagnosis associated with med order: 1. Hyperlipidemia LDL goal <100  - atorvastatin (LIPITOR) 20 MG tablet [Pharmacy Med Name: ATORVASTATIN 20 MG TABLET]; TAKE 1 TABLET BY MOUTH EVERY DAY  Dispense: 90 tablet; Refill: 2    If protocol passes may refill for 12 months if within 3 months of last provider visit (or a total of 15 months).

## 2021-06-16 PROBLEM — H91.93 BILATERAL HEARING LOSS: Status: ACTIVE | Noted: 2021-03-26

## 2021-06-16 PROBLEM — M17.0 PRIMARY OSTEOARTHRITIS OF BOTH KNEES: Status: ACTIVE | Noted: 2021-03-26

## 2021-06-16 PROBLEM — M25.512 CHRONIC LEFT SHOULDER PAIN: Status: ACTIVE | Noted: 2021-05-03

## 2021-06-16 PROBLEM — G89.29 CHRONIC LEFT SHOULDER PAIN: Status: ACTIVE | Noted: 2021-05-03

## 2021-06-16 PROBLEM — M17.12 PRIMARY OSTEOARTHRITIS OF LEFT KNEE: Status: ACTIVE | Noted: 2020-01-06

## 2021-06-16 NOTE — PATIENT INSTRUCTIONS - HE
Annual flu shot every fall    Recommending new shingles vaccine, Shingrix.  You should wait for at least 2-4 weeks after getting your second Covid vaccine before doing this.    Annual mammogram for breast cancer screening    Colonoscopy will be due December 2021    I would recommend seeing a dermatologist to get a total body skin exam and to have your lip evaluated.  Contact dermatology consultants at [phone].  Dr. Isidra Rangel is an excellent dermatologist.

## 2021-06-17 NOTE — PATIENT INSTRUCTIONS - HE
Patient Instructions by Ho Quezada MD at 8/27/2019  9:00 AM     Author: Ho Quezada MD Service: -- Author Type: Physician    Filed: 8/27/2019  9:32 AM Encounter Date: 8/27/2019 Status: Addendum    : Ho Quezada MD (Physician)    Related Notes: Original Note by Ho Quezada MD (Physician) filed at 8/27/2019  9:31 AM         Patient Education   Understanding USDA MyPlate  The USDA (US Department of Agriculture) has guidelines to help you make healthy food choices. These are called MyPlate. MyPlate shows the food groups that make up healthy meals using the image of a place setting. Before you eat, think about the healthiest choices for what to put onto your plate or into your cup or bowl. To learn more about building a healthy plate, visit www.choosemyplate.gov.       The Food Groups    Fruits: Any fruit or 100% fruit juice counts as part of the Fruit Group. Fruits may be fresh, canned, frozen, or dried, and may be whole, cut-up, or pureed. Make half your plate fruits and vegetables.    Vegetables: Any vegetable or 100% vegetable juice counts as a member of the Vegetable Group. Vegetables may be fresh, frozen, canned, or dried. They can be served raw or cooked and may be whole, cut-up, or mashed. Make half your plate fruits and vegetables.     Grains: All foods made from grains are part of the Grains Group. These include wheat, rice, oats, cornmeal, and barley such as bread, pasta, oatmeal, cereal, tortillas, and grits. Grains should be no more than a quarter of your plate. At least half of your grains should be whole grains.    Protein: This group includes meat, poultry, seafood, beans and peas, eggs, processed soy products (like tofu), nuts (including nut butters), and seeds. Make protein choices no more than a quarter of your plate. Meat and poultry choices should be lean or low fat.    Dairy: All fluid milk products and foods made from milk that contain calcium, like  yogurt and cheese are part of the Dairy Group. (Foods that have little calcium, such as cream, butter, and cream cheese, are not part of the group.) Most dairy choices should be low-fat or fat-free.    Oils: These are fats that are liquid at room temperature. They include canola, corn, olive, soybean, and sunflower oil. Foods that are mainly oil include mayonnaise, certain salad dressings, and soft margarines. You should have only 5 to 7 teaspoons of oils a day. You probably already get this much from the food you eat.  Use [employer] to Help Build Your Meals  The [employer] can help you plan and track your meals and activity. You can look up individual foods to see or compare their nutritional value. You can get guidelines for what and how much you should eat. You can compare your food choices. And you can assess personal physical activities and see ways you can improve. Go to www.Coupay.gov/b3 biocker/.    8609-1520 [employer]. [address] [place] [address], [place] [address]. All rights reserved. This information is not intended as a substitute for professional medical care. Always follow your healthcare professional's instructions.        Recommending new shingles vaccine, Shingrix.  Check with your insurance and pharmacy for coverage  Continue annual mammograms  Follow-up with OB/GYN every year  Colonoscopy will be due 2021  DEXA for osteoporosis screening at age 65

## 2021-06-17 NOTE — PROGRESS NOTES
[facility]  [address] [facility] [address]  Dept: [phone]  Dept Fax: [phone]  Primary Provider: Ho Quezada MD  Pre-op Performing Provider: HO QUEZADA      PREOPERATIVE EVALUATION:  Today's date: 5/3/2021    Kandi Kim is a 65 y.o. female who presents for a preoperative evaluation.    Surgical Information:  Surgery/Procedure: Left Arthroscopic Shoulder  Surgery Location: [facility]  Surgeon: Dr Malone  Surgery Date: 5-  Time of Surgery: 11:00 AM  Where patient plans to recover: At home with family  Fax number for surgical facility: [phone]    Type of Anesthesia Anticipated: General    Assessment & Plan      The proposed surgical procedure is considered INTERMEDIATE risk.    Preop general physical exam  She is cleared to proceed with surgery and anesthesia.  Overall risk is low.  She will avoid aspirin and NSAIDs for 1 week prior to surgery.  DVT prophylaxis with early ambulation.  - HM2 (CBC w/o Differential)    Chronic left shoulder pain  Chronic left shoulder pain with torn tendon with plans for arthroscopic shoulder surgery    Postoperative nausea and vomiting  She has a history of nausea postoperatively and may need premedication    Essential hypertension  Good blood pressure control.  She will take her normal dose of lisinopril on the morning of surgery  - HM2 (CBC w/o Differential)    Gastroesophageal reflux disease with esophagitis without hemorrhage  Reflux well-controlled with omeprazole and she will take her usual dose on the morning of surgery with a small sip of water    Hypothyroidism, unspecified type  Rechecking TSH after dose was decreased at her last visit.  She will take her usual dose of Synthroid on the morning of surgery with a small sip of water  - Thyroid Stimulating Hormone (TSH)    HONEY (obstructive sleep apnea)  Diagnosis of mild sleep apnea but not wearing CPAP             Medication  Instructions:  Avoid aspirin and OTC NSAIDs such as ibuprofen, Motrin, Advil and Aleve for 1 week prior to surgery    Take all of your usual morning medications except aspirin on the morning of surgery with a small sip of water      RECOMMENDATION:  APPROVAL GIVEN to proceed with proposed procedure, without further diagnostic evaluation.                Subjective     HPI related to upcoming procedure: 65-year-old woman with hypertension, hypothyroidism and sleep apnea with osteoarthritis.  Chronic left shoulder pain with plans for arthroscopic shoulder surgery next week.    She has tolerated previous surgery and anesthesia without significant problems although has had some nausea from anesthesia in the distant past.  Has tolerated or recent surgical procedures without problem.    No history of coronary artery disease and no exertional chest pain.    She does have mild sleep apnea but does not wear CPAP    No personal or family history of DVT or pulmonary embolus    Preop Questions 5/3/2021   Have you ever had a heart attack or stroke? No   Have you ever had surgery on your heart or blood vessels, such as a stent placement, a coronary artery bypass, or surgery on an artery in your head, neck, heart, or legs? No   Do you have chest pain with activity? No   Do you have a history of  heart failure? No   Do you currently have a cold, bronchitis or symptoms of other infection? No   Do you have a cough, shortness of breath, or wheezing? No   Do you or anyone in your family have previous history of blood clots? No   Do you or does anyone in your family have a serious bleeding problem such as prolonged bleeding following surgeries or cuts? No   Have you ever had problems with anemia or been told to take iron pills? No   Have you had any abnormal blood loss such as black, tarry or bloody stools, or abnormal vaginal bleeding? No   Have you ever had a blood transfusion? No   Are you willing to have a blood transfusion if it is  medically needed before, during, or after your surgery? Yes   Have you or any of your relatives ever had problems with anesthesia? No   Do you have sleep apnea, excessive snoring or daytime drowsiness? YES -sleep apnea   Do you have a CPAP machine? No   Do you have any artifical heart valves or other implanted medical devices like a pacemaker, defibrillator, or continuous glucose monitor? No   Do you have artificial joints? YES -she has had both knees replaced   Are you allergic to latex? No     Health Care Directive:  Patient does not have a Health Care Directive or Living Will:         Review of Systems  CONSTITUTIONAL: NEGATIVE for fever, chills, change in weight  RESP: NEGATIVE for significant cough or SOB  CV: NEGATIVE for chest pain, palpitations or peripheral edema  ROS otherwise negative    Patient Active Problem List    Diagnosis Date Noted     Bilateral hearing loss 03/26/2021     Primary osteoarthritis of both knees 03/26/2021     Primary osteoarthritis of left knee 01/06/2020     GERD with esophagitis      History of colon polyps      Solitary kidney, acquired 04/26/2016     HTN (hypertension)      Hypothyroidism      Hyperlipidemia      HONEY (obstructive sleep apnea)      Postmenopausal symptoms      Migraines      Chronic sinusitis      Past Medical History:   Diagnosis Date     Acute torn meniscus of knee, left, initial encounter 02/08/2018     Acute torn meniscus of knee, left, sequela 05/14/2019     Bilateral hearing loss 03/26/2021    Hearing aids     Chronic sinusitis      Diverticulitis 08/09/2016    CT scan normal     GERD with esophagitis     Complaints of dysphagia with EGD showing esophagitis.  Negative for eosinophilic esophagitis September 2019     History of colon polyps     Colonoscopy December 2018 with multiple polyps.  Repeat in 3 years.  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      Migraines      HONEY (obstructive sleep apnea)      Osteoarthritis     knees, hands     PONV (postoperative nausea and vomiting)      Postmenopausal symptoms      Primary osteoarthritis of both knees 03/26/2021    Bilateral TKA     Primary osteoarthritis of right knee 04/12/2016    R TKA     Rectal bleeding 08/09/2016     Rectocele 11/11/2016    Surgery is planned     Screening     DEXA normal 2016     Situational anxiety      Past Surgical History:   Procedure Laterality Date     BUNIONECTOMY       COLPORRHAPHY N/A 2/21/2017    Procedure: POSTERIOR COLPORRHAPHY;  Surgeon: Roxana Frias MD;  Location: [facility];  Service:      KNEE ARTHROPLASTY Right 04/2016     KNEE ARTHROSCOPY Bilateral 2011 2014     MIDDLE EAR SURGERY       NEPHRECTOMY Left     Kidney donor     OOPHORECTOMY       TUBAL LIGATION       VAGINAL HYSTERECTOMY N/A 2/21/2017    Procedure: TOTAL VAGINAL HYSTERECTOMY, BILATERAL SALPINGO-OOPHORECTOMY;  Surgeon: Roxana Frias MD;  Location: [facility];  Service:      Current Outpatient Medications   Medication Sig Dispense Refill     aspirin 81 MG EC tablet Take 81 mg by mouth daily.       atorvastatin (LIPITOR) 20 MG tablet TAKE 1 TABLET BY MOUTH EVERY DAY 90 tablet 1     citalopram (CELEXA) 20 MG tablet Take 1 tablet (20 mg total) by mouth daily. 90 tablet 0     levothyroxine (SYNTHROID) 112 MCG tablet Take 1 tablet (112 mcg total) by mouth daily. 90 tablet 3     lisinopriL (PRINIVIL,ZESTRIL) 10 MG tablet Take 1 tablet (10 mg total) by mouth daily. 90 tablet 0     omeprazole (PRILOSEC OTC) 20 MG tablet Take 1 tablet (20 mg total) by mouth daily before breakfast. 90 tablet 3     No current facility-administered medications for this visit.        No Known Allergies    Social History     Tobacco Use     Smoking status: Never Smoker     Smokeless tobacco: Never Used   Substance Use Topics     Alcohol use: Yes     Comment: infrequent      Family History   Problem Relation Age of Onset      "Stroke Mother      Breast cancer Sister 62     Breast cancer Cousin         age in 50's     Social History     Substance and Sexual Activity   Drug Use No        Objective     /80 (Patient Site: Right Arm, Patient Position: Sitting, Cuff Size: Adult Large)   Pulse 81   Temp 97.2  F (36.2  C) (Tympanic)   Ht 5' 2\" (1.575 m)   Wt 203 lb (92.1 kg)   LMP  (LMP Unknown)   SpO2 98%   BMI 37.13 kg/m    Physical Exam  GENERAL APPEARANCE: healthy, alert and no distress  HENT: Normal  RESP: lungs clear to auscultation - no rales, rhonchi or wheezes  CV: regular rate and rhythm, normal S1 S2, no S3 or S4 and no murmur, click or rub  ABDOMEN: soft, nontender, no HSM or masses and bowel sounds normal  NEURO: Normal strength and tone, sensory exam grossly normal, mentation intact and speech normal        PRE-OP Diagnostics:   Labs-hemoglobin 13.2 platelet 280    No EKG required, no history of coronary heart disease, significant arrhythmia, peripheral arterial disease or other structural heart disease.    REVISED CARDIAC RISK INDEX (RCRI)   The patient has the following serious cardiovascular risks for perioperative complications:   - No serious cardiac risks = 0 points    RCRI INTERPRETATION: 0 points: Class I (very low risk - 0.4% complication rate)         Signed Electronically by: Ho Quezada MD    Copy of this evaluation report is provided to requesting physician.                "

## 2021-06-17 NOTE — PATIENT INSTRUCTIONS - HE

## 2021-06-22 NOTE — TELEPHONE ENCOUNTER
Due to be seen and labs     Medication was last renewed on 10/4/18.    Leticia Batista, [facility] Triage/Med Refill 1/5/2019     Requested Prescriptions   Pending Prescriptions Disp Refills     levothyroxine (SYNTHROID, LEVOTHROID) 125 MCG tablet 90 tablet 0     Sig: Take 1 tablet (125 mcg total) by mouth daily.    Thyroid Hormones Protocol Passed - 1/5/2019 12:59 PM       Passed - Provider visit in past 12 months or next 3 months    Last office visit with prescriber/PCP: 10/26/2017 Ho Quezada MD OR same dept: Visit date not found OR same specialty: 10/26/2017 Ho Quezada MD  Last physical: 2/8/2018 Last MTM visit: Visit date not found   Next visit within 3 mo: Visit date not found  Next physical within 3 mo: Visit date not found  Prescriber OR PCP: Ho Quezada MD  Last diagnosis associated with med order: 1. Hypothyroidism  - levothyroxine (SYNTHROID, LEVOTHROID) 125 MCG tablet; Take 1 tablet (125 mcg total) by mouth daily.  Dispense: 90 tablet; Refill: 0    If protocol passes may refill for 12 months if within 3 months of last provider visit (or a total of 15 months).            Passed - TSH on file in past 12 months for patient age 12 & older    TSH   Date Value Ref Range Status   02/08/2018 0.21 (L) 0.30 - 5.00 uIU/mL Final

## 2021-06-22 NOTE — TELEPHONE ENCOUNTER
Due to be seen     Medication was last renewed on 12/15/17.    Leticia Batista, [facility] Connection Triage/Med Refill 1/7/2019     Requested Prescriptions   Pending Prescriptions Disp Refills     citalopram (CELEXA) 20 MG tablet [Pharmacy Med Name: CITALOPRAM HBR 20 MG TABLET] 90 tablet 3     Sig: TAKE 1 TABLET EVERY DAY    SSRI Refill Protocol  Passed - 1/6/2019  8:43 AM       Passed - PCP or prescribing provider visit in last year    Last office visit with prescriber/PCP: Visit date not found OR same dept: Visit date not found OR same specialty: 10/26/2017 Ho Quezada MD  Last physical: Visit date not found Last MTM visit: Visit date not found   Next visit within 3 mo: Visit date not found  Next physical within 3 mo: Visit date not found  Prescriber OR PCP: Pedro Hopson MD  Last diagnosis associated with med order: There are no diagnoses linked to this encounter.  If protocol passes may refill for 12 months if within 3 months of last provider visit (or a total of 15 months).

## 2021-06-22 NOTE — TELEPHONE ENCOUNTER
Returning Call  Reason for call:  Pharmacy returning call to check on the status of this request    Information relayed to patient:  Pending providers review and approval.   Patient has additional questions:  No  If YES, what are your questions/concerns:  none  Okay to leave a detailed message?: Yes

## 2021-06-22 NOTE — TELEPHONE ENCOUNTER
Returning Call  Reason for call:  Pharmacy returning call to check on the status of this call  Information relayed to patient:  Pending providers review and approval.   Patient has additional questions:  No  If YES, what are your questions/concerns:  none  Okay to leave a detailed message?: Yes

## 2021-06-23 NOTE — TELEPHONE ENCOUNTER
Refill Approved    Rx renewed per Medication Renewal Policy. Medication was last renewed on 1/22/2018 with 3 refills.  Last office visit: 2/8/2018 with Dr DENG Quezada.  Message sent to pharmacy: due for office visit in February.     Michelle Hernández, [facility] Triage/Med Refill 1/30/2019     Requested Prescriptions   Pending Prescriptions Disp Refills     atorvastatin (LIPITOR) 20 MG tablet [Pharmacy Med Name: ATORVASTATIN 20 MG TABLET] 90 tablet 3     Sig: Take 1 tablet (20 mg total) by mouth daily.    Statins Refill Protocol (Hmg CoA Reductase Inhibitors) Passed - 1/30/2019  1:23 AM       Passed - PCP or prescribing provider visit in past 12 months     Last office visit with prescriber/PCP: 10/26/2017 Ho Quezada MD OR same dept: Visit date not found OR same specialty: 10/26/2017 Ho Quezada MD  Last physical: 2/8/2018 Last MTM visit: Visit date not found   Next visit within 3 mo: Visit date not found  Next physical within 3 mo: Visit date not found  Prescriber OR PCP: Ho Quezada MD  Last diagnosis associated with med order: 1. Hyperlipidemia LDL goal <100  - atorvastatin (LIPITOR) 20 MG tablet [Pharmacy Med Name: ATORVASTATIN 20 MG TABLET]; TAKE 1 TABLET EVERY DAY  Dispense: 90 tablet; Refill: 3    If protocol passes may refill for 12 months if within 3 months of last provider visit (or a total of 15 months).

## 2021-06-25 NOTE — TELEPHONE ENCOUNTER
RN cannot approve Refill Request    RN can NOT refill this medication Dose change. This dose was discontinued.. Last office visit: 10/26/2017 Ho Quezada MD Last Physical: 5/3/2021 Last MTM visit: Visit date not found Last visit same specialty: 8/11/2020 Shavonne Knutson MD.  Next visit within 3 mo: Visit date not found  Next physical within 3 mo: Visit date not found      Lubna Whalen, [facility] Connection Triage/Med Refill 5/27/2021    Requested Prescriptions   Pending Prescriptions Disp Refills     levothyroxine (SYNTHROID, LEVOTHROID) 125 MCG tablet [Pharmacy Med Name: LEVOTHYROXINE 125 MCG TABLET] 90 tablet 0     Sig: TAKE 1 TABLET BY MOUTH DAILY AT 6:00 AM. APPOINTMENT NEEDED FOR FURTHER REFILLS       Thyroid Hormones Protocol Passed - 5/26/2021  8:02 AM        Passed - Provider visit in past 12 months or next 3 months     Last office visit with prescriber/PCP: 10/26/2017 Ho Quezada MD OR same dept: Visit date not found OR same specialty: 8/11/2020 Shavonne Knutson MD  Last physical: 5/3/2021 Last MTM visit: Visit date not found   Next visit within 3 mo: Visit date not found  Next physical within 3 mo: Visit date not found  Prescriber OR PCP: Ho Quezada MD  Last diagnosis associated with med order: 1. Hypothyroidism  - levothyroxine (SYNTHROID, LEVOTHROID) 125 MCG tablet [Pharmacy Med Name: LEVOTHYROXINE 125 MCG TABLET]; TAKE 1 TABLET BY MOUTH DAILY AT 6:00 AM. APPOINTMENT NEEDED FOR FURTHER REFILLS  Dispense: 90 tablet; Refill: 0    If protocol passes may refill for 12 months if within 3 months of last provider visit (or a total of 15 months).             Passed - TSH on file in past 12 months for patient age 12 & older     TSH   Date Value Ref Range Status   05/03/2021 0.16 (L) 0.30 - 5.00 uIU/mL Final

## 2021-06-25 NOTE — TELEPHONE ENCOUNTER
Refill Approved    Rx renewed per Medication Renewal Policy. Medication was last renewed on 3/9/21.    Papa Fernando, [facility] Triage/Med Refill 6/4/2021     Requested Prescriptions   Pending Prescriptions Disp Refills     citalopram (CELEXA) 20 MG tablet [Pharmacy Med Name: CITALOPRAM HBR 20 MG TABLET] 90 tablet 0     Sig: TAKE 1 TABLET BY MOUTH EVERY DAY       SSRI Refill Protocol  Passed - 6/3/2021  8:06 AM        Passed - PCP or prescribing provider visit in last year     Last office visit with prescriber/PCP: 10/26/2017 Ho Quezada MD OR same dept: Visit date not found OR same specialty: 8/11/2020 Shavonne Knutson MD  Last physical: 5/3/2021 Last MTM visit: Visit date not found   Next visit within 3 mo: Visit date not found  Next physical within 3 mo: Visit date not found  Prescriber OR PCP: Ho Quezada MD  Last diagnosis associated with med order: 1. Depression, unspecified depression type  - citalopram (CELEXA) 20 MG tablet [Pharmacy Med Name: CITALOPRAM HBR 20 MG TABLET]; TAKE 1 TABLET BY MOUTH EVERY DAY  Dispense: 90 tablet; Refill: 0    If protocol passes may refill for 12 months if within 3 months of last provider visit (or a total of 15 months).

## 2021-06-27 NOTE — PROGRESS NOTES
Progress Notes by Ho Quezada MD at 8/27/2019  9:00 AM     Author: Ho Quezada MD Service: -- Author Type: Physician    Filed: 8/27/2019  9:39 AM Encounter Date: 8/27/2019 Status: Signed    : Ho Quezada MD (Physician)       FEMALE PREVENTATIVE EXAM    Assessment and Plan:       1. Routine general medical examination at a health care facility  Immunizations are reviewed and recommending Shingrix and continue annual flu shots.  Living will has been discussed.  Non-smoker.  Uses alcohol in moderation.  Regular exercise discussed.  Up to date with colonoscopies and this should be repeated in 2021.  Continue annual mammograms.  Continue to follow-up with OB/GYN annually.  Last DEXA was 2016 and was normal and this should be repeated at age 67.  She sees an ophthalmologist regularly and gets glaucoma screening.  Skin exam performed and recommending regular use of sunblock.  Hepatitis C antibody for screening was normal.  Will screen for diabetes with fasting glucose.      2. Esophageal dysphagia  Increasing reflux symptoms with dysphagia over the past 2 months.  No previous history of reflux.  No unexpected weight loss.  She can try OTC Prilosec 20 mg every morning but she needs an EGD and will get this scheduled.  - HM2(CBC w/o Differential)  - Ambulatory Referral for Upper GI Endoscopy    3. Essential hypertension  Blood pressure looks well controlled with current dose of lisinopril  - Basic Metabolic Panel  - Urinalysis-UC if Indicated    4. Hyperlipidemia, unspecified hyperlipidemia type  Recheck lipid profile on atorvastatin.  Continues aspirin 81 mg daily.  - Lipid Cascade    5. Hypothyroidism, unspecified type  Recheck TSH.  Currently skipping dose every other Sunday as TSH was slightly suppressed at last visit.  She has gained 13 pounds since then.  - Thyroid Stimulating Hormone (TSH)    6. HONEY (obstructive sleep apnea)  Using mouthguard    7. History of colon polyps  Multiple  polyps on colonoscopy December 2018, repeat after 3 years    8. Primary osteoarthritis involving multiple joints  History of knee replacement.    9. Medication monitoring encounter  Monitor LFTs while on statin  - Hepatic Profile    10. Screening for HIV (human immunodeficiency virus)    - HIV Antigen/Antibody Screening [place]    11.  Mood is stable with current dose of citalopram    Over 20 minutes was spent addressing these chronic and new medical problems beyond time spent performing annual physical with over 50% of the time spent counseling and coordination of care    Next follow up:  No follow-ups on file.    Immunization Review  Adult Imm Review: Recommending Shingrix and flu shot        I discussed the following with the patient:   Adult Healthy Living: Importance of regular exercise  Healthy nutrition     The patient was counseled and encouraged to consider modifying their diet and eating habits. She was provided with information on recommended healthy diet options.        Subjective:   Chief Complaint: Kandi Kim is an 64 y.o. female here for a preventative health visit.     HPI: In addition to her annual physical and follow-up of chronic medical problems, she is developed a new problem with dysphagia.  No previous history of reflux but over the past 1 to 2 months, she has noticed more difficulty swallowing especially solid food.  Also having increasing reflux symptoms.  No abdominal pain.  No melena or hematochezia.  No unexpected weight loss.  No previous problems with swallowing.    Healthy Habits  Are you taking a daily aspirin? Yes  Do you typically exercising at least 40 min, 3-4 times per week?  NO  Do you usually eat at least 4 servings of fruit and vegetables a day, include whole grains and fiber and avoid regularly eating high fat foods? NO  Have you had an eye exam in the past two years? Yes  Do you see a dentist twice per year? Yes  Do you have any concerns regarding sleep?  "No    Safety Screen  If you own firearms, are they secured in a locked gun cabinet or with trigger locks? Yes  Do you feel you are safe where you are living?: Yes (8/27/2019  8:59 AM)  Do you feel you are safe in your relationship(s)?: Yes (8/27/2019  8:59 AM)      Review of Systems:  Please see above.  The rest of the review of systems are negative for all systems.     Pap History:   Last 3 PAP and HPV Results:   Cancer Screening       Status Date      MAMMOGRAM Next Due 4/2/2020      Done 4/2/2018 MAMMO SCREENING BILATERAL     Patient has more history with this topic...    PAP SMEAR Next Due 11/4/2021      Done 11/4/2016     COLONOSCOPY Next Due 12/17/2028      Done 12/17/2018 COLONOSCOPY EXTERNAL RESULT     Patient has more history with this topic...              History     Reviewed By Date/Time Sections Reviewed    Meghana Araiza CMA 8/27/2019  8:59 AM Tobacco, Alcohol, Drug Use, Sexual Activity            Objective:   Vital Signs:   Visit Vitals  /76 (Patient Site: Left Arm, Patient Position: Sitting, Cuff Size: Adult Large)   Pulse 69   Ht 5' 2\" (1.575 m)   Wt 203 lb (92.1 kg)   LMP  (LMP Unknown)   SpO2 97%   BMI 37.13 kg/m           PHYSICAL EXAM  EYES: Eyelids, conjunctiva, and sclera were normal. Pupils were normal. Cornea, iris, and lens were normal bilaterally.  HEAD, EARS, NOSE, MOUTH, AND THROAT: Head and face were normal. Nose appearance was normal and there was no discharge. Oropharynx was normal.  NECK: Neck appearance was normal. There were no neck masses and the thyroid was not enlarged and no nodules are felt.  No lymphadenopathy.  RESPIRATORY: Breathing pattern was normal and the chest moved symmetrically.  Percussion/auscultatory percussion was normal.  Lung sounds were normal and there were no rales or wheezes.  CARDIOVASCULAR: Heart rate and rhythm were normal.  S1 and S2 were normal and there were no extra sounds or murmurs. Peripheral pulses in arms and legs were normal.  Jugular " venous pressure was normal.  There was no peripheral edema.  No carotid bruits.  GASTROINTESTINAL: The abdomen was normal in contour.  Bowel sounds were present.   Palpation detected no tenderness, mass, or enlarged organs.   MUSCULOSKELETAL: Skeletal configuration was normal and muscle mass was normal for age. Joint appearance was overall normal.  LYMPHATIC: There were no enlarged nodes.  SKIN/HAIR/NAILS: Skin color was normal.  Hair and nails were normal.There were no skin lesions.  NEUROLOGIC: The patient was alert and oriented to person, place, time, and circumstance. Speech was normal. Cranial nerves were normal. Motor strength was normal for age. The patient was normally coordinated.  Sensation intact.  PSYCHIATRIC:  Mood and affect were normal and the patient had normal recent and remote memory. The patient's judgment and insight were normal.             Medication List           Accurate as of 8/27/19  9:38 AM. If you have any questions, ask your nurse or doctor.               START taking these medications    omeprazole 20 MG tablet  Also known as:  PriLOSEC OTC  INSTRUCTIONS:  Take 1 tablet (20 mg total) by mouth daily before breakfast.  Started by:  Ho Quezada MD           CONTINUE taking these medications    aspirin 81 MG EC tablet  INSTRUCTIONS:  Take 81 mg by mouth daily.        atorvastatin 20 MG tablet  Also known as:  LIPITOR  INSTRUCTIONS:  TAKE 1 TABLET BY MOUTH EVERY DAY  Doctor's comments:  DX Code E 78.5        citalopram 20 MG tablet  Also known as:  celeXA  INSTRUCTIONS:  TAKE 1 TABLET BY MOUTH EVERY DAY        levothyroxine 125 MCG tablet  Also known as:  SYNTHROID, LEVOTHROID  INSTRUCTIONS:  Take 1 tablet (125 mcg total) by mouth Daily at 6:00 am.        lisinopril 10 MG tablet  Also known as:  PRINIVIL,ZESTRIL  INSTRUCTIONS:  TAKE 1 TABLET (10 MG TOTAL) BY MOUTH DAILY.              Where to Get Your Medications      You can get these medications from any pharmacy    You don't  need a prescription for these medications    omeprazole 20 MG tablet         Additional Screenings Completed Today:

## 2021-06-30 NOTE — PROGRESS NOTES
Progress Notes by Ho Quezada MD at 3/26/2021  8:40 AM     Author: Ho Quezada MD Service: -- Author Type: Physician    Filed: 3/26/2021 12:27 PM Encounter Date: 3/26/2021 Status: Signed    : Ho Quezada MD (Physician)       FEMALE PREVENTATIVE EXAM    Assessment and Plan:     Patient has been advised of split billing requirements and indicates understanding: Yes    1. Routine general medical examination at a health care facility  Immunizations are reviewed and recommending Shingrix when she has her Covid vaccine completed. Continue annual flu shots. Living will has been discussed.  Non-smoker.  Uses alcohol in moderation.  Regular exercise discussed.  Up to date with colonoscopies and this should be repeated in December 2021 with multiple polyps removed at her last colonoscopy. Continue annual mammogram. She has had complete hysterectomy.   Recommending that she sees an ophthalmologist every year. Skin exam performed and recommending regular use of sunblock. Recommending total body skin exam with dermatologist every 1 to 2 years. Hepatitis C antibody for screening was normal.  Will screen for diabetes with fasting glucose.     2. Essential hypertension  Blood pressure is well controlled taking lisinopril 10 mg daily  - Comprehensive Metabolic Panel  - HM2(CBC w/o Differential)  - Urinalysis-UC if Indicated    3. Gastroesophageal reflux disease with esophagitis without hemorrhage  Reflux is well controlled taking omeprazole 20 mg every morning. No dysphagia.    4. HONEY (obstructive sleep apnea)  Wearing mouth appliance faithfully    5. Acquired hypothyroidism  Recheck TSH and adjust dose of Synthroid as needed  - Thyroid Stimulating Hormone (TSH)    6. Primary osteoarthritis of both knees  She has now had both knees replaced with the left completed in early 2020. Doing well    7. Hyperlipidemia, unspecified hyperlipidemia type  Recheck lipid profile on atorvastatin. Continues  aspirin 81 mg daily. Other rating statin without side effects. No exertional chest pain.  - Lipid Cascade    8. Bilateral hearing loss, unspecified hearing loss type  She now has hearing aids    9. History of colon polyps  Multiple polyps removed on her colonoscopy in December 2018. This should be repeated in December of this year.    10. Small cyst on lower lip is benign. She can have this evaluated by dermatology at time of total body skin exam.    Over 30 minutes was spent addressing these chronic and new medical problems beyond time spent performing annual physical    Next follow up:  Return in 1 year (on 3/26/2022) for Annual physical.    Immunization Review  Adult Imm Review: Discussed Covid vaccine and getting Shingrix completed once this is done        I discussed the following with the patient:   Adult Healthy Living: Importance of regular exercise  Healthy nutrition        Subjective:   Chief Complaint: Kandi Kim is an 65 y.o. female here for a preventative health visit.    Patient has been advised of split billing requirements and indicates understanding: Yes  HPI: 65-year-old woman here for her annual preventive visit and to follow-up chronic medical problems. See assessment and plan for details.    Healthy Habits  Are you taking a daily aspirin? Yes  Do you typically exercising at least 40 min, 3-4 times per week?  NO  Do you usually eat at least 4 servings of fruit and vegetables a day, include whole grains and fiber and avoid regularly eating high fat foods? Yes  Have you had an eye exam in the past two years? Yes  Do you see a dentist twice per year? Yes  Do you have any concerns regarding sleep? No    Safety Screen  If you own firearms, are they secured in a locked gun cabinet or with trigger locks? Yes  Do you feel you are safe where you are living?: Yes (3/26/2021  8:21 AM)  Do you feel you are safe in your relationship(s)?: Yes (3/26/2021  8:21 AM)      Review of Systems:  Please see  "above.  The rest of the review of systems are negative for all systems.       Cancer Screening       Status Date      MAMMOGRAM Next Due 1/19/2023      Done 1/19/2021 MAMMO SCREENING BILATERAL     Patient has more history with this topic...    PAP SMEAR This plan is no longer active.      Done 11/4/2016               History     Reviewed By Date/Time Sections Reviewed    Ho Quezada MD 3/26/2021  8:58 AM Medical, Surgical, Tobacco, Alcohol, Drug Use, Sexual Activity, Family, Social Documentation    Cindy Irene CMA 3/26/2021  8:25 AM Tobacco            Objective:   Vital Signs:   Visit Vitals  /80 (Patient Site: Right Arm, Patient Position: Sitting, Cuff Size: Adult Large)   Pulse 82   Temp 96.8  F (36  C) (Tympanic)   Ht 5' 2\" (1.575 m)   Wt 201 lb (91.2 kg)   LMP  (LMP Unknown)   SpO2 97%   BMI 36.76 kg/m           PHYSICAL EXAM  EYES: Eyelids, conjunctiva, and sclera were normal. Pupils were normal. Cornea, iris, and lens were normal bilaterally.  HEAD, EARS, NOSE, MOUTH, AND THROAT: Head and face were normal. TMs and external auditory canals are normal  NECK: Neck appearance was normal. There were no neck masses and the thyroid was not enlarged and no nodules are felt.  No lymphadenopathy.  RESPIRATORY: Breathing pattern was normal and the chest moved symmetrically.   Lung sounds were normal and there were no rales or wheezes.  CARDIOVASCULAR: Heart rate and rhythm were normal.  S1 and S2 were normal and there were no extra sounds or murmurs. Peripheral pulses in arms and legs were normal.  Jugular venous pressure was normal.  There was no peripheral edema.  No carotid bruits.  GASTROINTESTINAL:  Bowel sounds were present.   Palpation detected no tenderness, mass, or enlarged organs.   MUSCULOSKELETAL: Skeletal configuration was normal and muscle mass was normal for age. Joint appearance was overall normal.  LYMPHATIC: There were no enlarged nodes.  SKIN/HAIR/NAILS: Skin color was normal.  " Hair and nails were normal.There were no skin lesions. Benign cystlike lesion on lower lip.  NEUROLOGIC: The patient was alert and oriented to person, place, time, and circumstance. Speech was normal. Cranial nerves were normal. Motor strength was normal for age. The patient was normally coordinated.  Sensation intact.  PSYCHIATRIC:  Mood and affect were normal and the patient had normal recent and remote memory. The patient's judgment and insight were normal.             Medication List          Accurate as of March 26, 2021 12:26 PM. If you have any questions, ask your nurse or doctor.            CONTINUE taking these medications    aspirin 81 MG EC tablet  INSTRUCTIONS: Take 81 mg by mouth daily.        atorvastatin 20 MG tablet  Also known as: LIPITOR  INSTRUCTIONS: TAKE 1 TABLET BY MOUTH EVERY DAY  Doctor's comments: DX Code Needed  PLEASE SEND NEW RX ATORVASTATIN.        citalopram 20 MG tablet  Also known as: celeXA  INSTRUCTIONS: Take 1 tablet (20 mg total) by mouth daily.        levothyroxine 125 MCG tablet  Also known as: SYNTHROID, LEVOTHROID  INSTRUCTIONS: Take 1 tablet (125 mcg total) by mouth Daily at 6:00 am. Appointment needed for further refills        lisinopriL 10 MG tablet  Also known as: PRINIVIL,ZESTRIL  INSTRUCTIONS: Take 1 tablet (10 mg total) by mouth daily.        omeprazole 20 MG tablet  Also known as: PriLOSEC OTC  INSTRUCTIONS: Take 1 tablet (20 mg total) by mouth daily before breakfast.               Additional Screenings Completed Today:

## 2021-07-03 NOTE — ADDENDUM NOTE
Addendum Note by Wan Gill MD at 2/21/2017 12:27 PM     Author: Wan Gill MD Service: -- Author Type: Physician    Filed: 2/21/2017 12:27 PM Date of Service: 2/21/2017 12:27 PM Status: Signed    : Wan Gill MD (Physician)       Addendum  created 02/21/17 1227 by Wan Gill MD    Order sets accessed

## 2021-07-04 ENCOUNTER — COMMUNICATION - HEALTHEAST (OUTPATIENT)
Dept: INTERNAL MEDICINE | Facility: CLINIC | Age: 66
End: 2021-07-04

## 2021-07-04 DIAGNOSIS — I10 ESSENTIAL HYPERTENSION: ICD-10-CM

## 2021-07-04 RX ORDER — LISINOPRIL 10 MG/1
10 TABLET ORAL DAILY
Qty: 90 TABLET | Refills: 2 | Status: SHIPPED | OUTPATIENT
Start: 2021-07-04 | End: 2022-02-08

## 2021-07-04 NOTE — TELEPHONE ENCOUNTER
Telephone Encounter by Julia Mahajan, RN at 7/4/2021 11:59 AM     Author: Julia Mahajan, RN Service: -- Author Type: Registered Nurse    Filed: 7/4/2021 12:00 PM Encounter Date: 7/3/2021 Status: Signed    : Julia Mahajan, RN (Registered Nurse)       Refill Approved    Rx renewed per Medication Renewal Policy. Medication was last renewed on 3/11/21, last OV 5/3/21.    Julia Mahajan, [facility] Triage/Med Refill 7/4/2021     Requested Prescriptions   Pending Prescriptions Disp Refills   ? lisinopriL (PRINIVIL,ZESTRIL) 10 MG tablet [Pharmacy Med Name: LISINOPRIL 10 MG TABLET] 90 tablet 0     Sig: TAKE 1 TABLET BY MOUTH EVERY DAY       [facility] Inhibitors Refill Protocol Passed - 7/3/2021 10:41 AM        Passed - PCP or prescribing provider visit in past 12 months       Last office visit with prescriber/PCP: 10/26/2017 Ho Quezada MD OR same dept: Visit date not found OR same specialty: 8/11/2020 Shavonne Knutson MD  Last physical: 5/3/2021 Last MTM visit: Visit date not found   Next visit within 3 mo: Visit date not found  Next physical within 3 mo: Visit date not found  Prescriber OR PCP: Ho Quezada MD  Last diagnosis associated with med order: 1. Essential hypertension  - lisinopriL (PRINIVIL,ZESTRIL) 10 MG tablet [Pharmacy Med Name: LISINOPRIL 10 MG TABLET]; TAKE 1 TABLET BY MOUTH EVERY DAY  Dispense: 90 tablet; Refill: 0    If protocol passes may refill for 12 months if within 3 months of last provider visit (or a total of 15 months).             Passed - Serum Potassium in past 12 months     Lab Results   Component Value Date    Potassium 4.7 03/26/2021             Passed - Blood pressure filed in past 12 months     BP Readings from Last 1 Encounters:   05/03/21 124/80             Passed - Serum Creatinine in past 12 months     Creatinine   Date Value Ref Range Status   03/26/2021 1.02 0.60 - 1.10 mg/dL Final

## 2021-07-25 ENCOUNTER — TRANSFERRED RECORDS (OUTPATIENT)
Dept: HEALTH INFORMATION MANAGEMENT | Facility: CLINIC | Age: 66
End: 2021-07-25

## 2021-09-01 DIAGNOSIS — E78.5 HYPERLIPIDEMIA LDL GOAL <100: ICD-10-CM

## 2021-09-02 RX ORDER — ATORVASTATIN CALCIUM 20 MG/1
20 TABLET, FILM COATED ORAL DAILY
Qty: 90 TABLET | Refills: 2 | Status: SHIPPED | OUTPATIENT
Start: 2021-09-02 | End: 2022-06-06

## 2021-09-02 NOTE — TELEPHONE ENCOUNTER
"Last Written Prescription Date:  3/7/21  Last Fill Quantity: 90,  # refills: 1   Last office visit provider:  5/3/21     Requested Prescriptions   Pending Prescriptions Disp Refills     atorvastatin (LIPITOR) 20 MG tablet [Pharmacy Med Name: ATORVASTATIN 20 MG TABLET] 90 tablet 1     Sig: TAKE 1 TABLET BY MOUTH EVERY DAY       Statins Protocol Passed - 9/1/2021  2:31 PM        Passed - LDL on file in past 12 months     Recent Labs   Lab Test 03/26/21  0937                Passed - No abnormal creatine kinase in past 12 months     No lab results found.             Passed - Recent (12 mo) or future (30 days) visit within the authorizing provider's specialty     Patient has had an office visit with the authorizing provider or a provider within the authorizing providers department within the previous 12 mos or has a future within next 30 days. See \"Patient Info\" tab in inbasket, or \"Choose Columns\" in Meds & Orders section of the refill encounter.              Passed - Medication is active on med list        Passed - Patient is age 18 or older        Passed - No active pregnancy on record        Passed - No positive pregnancy test in past 12 months             Papa Fernando RN 09/02/21 9:34 AM  "

## 2021-11-10 ENCOUNTER — MYC MEDICAL ADVICE (OUTPATIENT)
Dept: INTERNAL MEDICINE | Facility: CLINIC | Age: 66
End: 2021-11-10
Payer: COMMERCIAL

## 2021-11-10 DIAGNOSIS — R13.19 ESOPHAGEAL DYSPHAGIA: ICD-10-CM

## 2021-11-10 RX ORDER — OMEPRAZOLE 20 MG/1
20 TABLET, DELAYED RELEASE ORAL
Qty: 90 TABLET | Refills: 3 | Status: SHIPPED | OUTPATIENT
Start: 2021-11-10 | End: 2021-11-19

## 2021-11-19 ENCOUNTER — MYC MEDICAL ADVICE (OUTPATIENT)
Dept: INTERNAL MEDICINE | Facility: CLINIC | Age: 66
End: 2021-11-19
Payer: COMMERCIAL

## 2021-11-19 DIAGNOSIS — R13.19 ESOPHAGEAL DYSPHAGIA: ICD-10-CM

## 2021-11-19 DIAGNOSIS — K21.00 GASTROESOPHAGEAL REFLUX DISEASE WITH ESOPHAGITIS WITHOUT HEMORRHAGE: Primary | ICD-10-CM

## 2021-11-19 RX ORDER — OMEPRAZOLE 20 MG/1
20 TABLET, DELAYED RELEASE ORAL
Qty: 90 TABLET | Refills: 3 | Status: SHIPPED | OUTPATIENT
Start: 2021-11-19 | End: 2022-02-08

## 2021-11-19 NOTE — TELEPHONE ENCOUNTER
Please change omeprazole tablet to omeprazole CAPSULE.  Then sign order and send to pharmacy again.  Thank you!      Medication:   Disp Refills Start End [facility]   omeprazole (PRILOSEC OTC) 20 MG EC tablet 90 tablet 3 11/10/2021  No   Sig - Route: Take 1 tablet (20 mg) by mouth daily before breakfast - Oral       Pharmacy:  [facility]/pharmacy #[id number] - [place], [place] - [address] [place] [address]  [phone]    Last OV:  5/03/2021

## 2022-02-08 ENCOUNTER — OFFICE VISIT (OUTPATIENT)
Dept: INTERNAL MEDICINE | Facility: CLINIC | Age: 67
End: 2022-02-08
Payer: COMMERCIAL

## 2022-02-08 VITALS
HEART RATE: 82 BPM | DIASTOLIC BLOOD PRESSURE: 80 MMHG | WEIGHT: 205 LBS | OXYGEN SATURATION: 98 % | SYSTOLIC BLOOD PRESSURE: 114 MMHG | BODY MASS INDEX: 37.73 KG/M2 | HEIGHT: 62 IN

## 2022-02-08 DIAGNOSIS — E03.9 HYPOTHYROIDISM, UNSPECIFIED TYPE: Primary | ICD-10-CM

## 2022-02-08 DIAGNOSIS — K21.00 GASTROESOPHAGEAL REFLUX DISEASE WITH ESOPHAGITIS WITHOUT HEMORRHAGE: ICD-10-CM

## 2022-02-08 DIAGNOSIS — Z23 HIGH PRIORITY FOR 2019-NCOV VACCINE: ICD-10-CM

## 2022-02-08 DIAGNOSIS — I10 PRIMARY HYPERTENSION: ICD-10-CM

## 2022-02-08 LAB — TSH SERPL DL<=0.005 MIU/L-ACNC: 0.44 UIU/ML (ref 0.3–5)

## 2022-02-08 PROCEDURE — 84443 ASSAY THYROID STIM HORMONE: CPT | Performed by: INTERNAL MEDICINE

## 2022-02-08 PROCEDURE — 99214 OFFICE O/P EST MOD 30 MIN: CPT | Performed by: INTERNAL MEDICINE

## 2022-02-08 PROCEDURE — 91305 COVID-19,PF,PFIZER (12+ YRS): CPT | Performed by: INTERNAL MEDICINE

## 2022-02-08 PROCEDURE — 36415 COLL VENOUS BLD VENIPUNCTURE: CPT | Performed by: INTERNAL MEDICINE

## 2022-02-08 PROCEDURE — 0054A COVID-19,PF,PFIZER (12+ YRS): CPT | Performed by: INTERNAL MEDICINE

## 2022-02-08 ASSESSMENT — PATIENT HEALTH QUESTIONNAIRE - PHQ9: SUM OF ALL RESPONSES TO PHQ QUESTIONS 1-9: 0

## 2022-02-08 ASSESSMENT — MIFFLIN-ST. JEOR: SCORE: 1433.12

## 2022-02-08 NOTE — PROGRESS NOTES
"   Assessment & Plan     Hypothyroidism, unspecified type  Hypothyroidism and continues on levothyroxine 112 mcg daily.  However, she has had a difficult time losing any weight despite exercising 4 times per week.  Also noticing some hair loss.  We will recheck TSH to make sure dose is adequate.  - TSH  - TSH    Primary hypertension  Blood pressure is looking well controlled taking lisinopril 10 mg daily    Gastroesophageal reflux disease with esophagitis without hemorrhage  She is noticing some breakthrough symptoms with difficulty swallowing despite taking omeprazole 20 mg every morning.  Similar symptoms to what she was having when she had EGD and was found to have esophagitis.  Symptoms had been well controlled until recently.  No obvious changes in diet.  No weight gain.  I am recommending repeating EGD.  - omeprazole (PRILOSEC) 20 MG DR capsule  Dispense: 90 capsule; Refill: 3    High priority for 2019-nCoV vaccine  She is due for her booster and we discussed the benefits of getting this done today and she is agreeable.  - COVID-19,PF,PFIZER (12+ Yrs GRAY LABEL)       BMI:   Estimated body mass index is 37.49 kg/m  as calculated from the following:    Height as of this encounter: 1.575 m (5' 2\").    Weight as of this encounter: 93 kg (205 lb).                Return in about 5 months (around 7/8/2022) for Routine preventive.    Ho Quezada MD  [facility]          Subjective       HPI 66-year-old woman here to discuss difficulties losing weight and hair loss with underlying hypothyroidism and to discuss GERD and increasing symptoms of dysphagia.  See assessment and plan for details of visit    Current Outpatient Medications   Medication     aspirin 81 MG EC tablet     atorvastatin (LIPITOR) 20 MG tablet     citalopram (CELEXA) 20 MG tablet     levothyroxine (SYNTHROID) 112 MCG tablet     lisinopriL (PRINIVIL,ZESTRIL) 10 MG tablet     omeprazole (PRILOSEC) 20 MG DR" "capsule     No current facility-administered medications for this visit.        Review of Systems  No abdominal pain.  No nausea.        Objective    Vitals:    02/08/22 1439   BP: 114/80   BP Location: Right arm   Patient Position: Sitting   Pulse: 82   SpO2: 98%   Weight: 93 kg (205 lb)   Height: 1.575 m (5' 2\")        Physical Exam  Well-appearing middle-aged woman      "

## 2022-02-13 ENCOUNTER — HOSPITAL ENCOUNTER (EMERGENCY)
Facility: CLINIC | Age: 67
Discharge: HOME | End: 2022-02-14
Attending: EMERGENCY MEDICINE | Admitting: EMERGENCY MEDICINE
Payer: COMMERCIAL

## 2022-02-13 DIAGNOSIS — R07.89 ATYPICAL CHEST PAIN: ICD-10-CM

## 2022-02-13 LAB
ALBUMIN SERPL-MCNC: 3.8 G/DL (ref 3.5–5)
ALP SERPL-CCNC: 133 U/L (ref 45–120)
ALT SERPL W P-5'-P-CCNC: 19 U/L (ref 0–45)
ANION GAP SERPL CALCULATED.3IONS-SCNC: 9 MMOL/L (ref 5–18)
AST SERPL W P-5'-P-CCNC: 17 U/L (ref 0–40)
BASOPHILS # BLD AUTO: 0 10E3/UL (ref 0–0.2)
BASOPHILS NFR BLD AUTO: 1 %
BILIRUB SERPL-MCNC: 0.3 MG/DL (ref 0–1)
BUN SERPL-MCNC: 16 MG/DL (ref 8–22)
CALCIUM SERPL-MCNC: 9 MG/DL (ref 8.5–10.5)
CHLORIDE BLD-SCNC: 109 MMOL/L (ref 98–107)
CO2 SERPL-SCNC: 21 MMOL/L (ref 22–31)
CREAT SERPL-MCNC: 0.99 MG/DL (ref 0.6–1.1)
D DIMER PPP FEU-MCNC: 0.53 UG/ML FEU (ref 0–0.5)
EOSINOPHIL # BLD AUTO: 0.2 10E3/UL (ref 0–0.7)
EOSINOPHIL NFR BLD AUTO: 4 %
ERYTHROCYTE [DISTWIDTH] IN BLOOD BY AUTOMATED COUNT: 12.1 % (ref 10–15)
GFR SERPL CREATININE-BSD FRML MDRD: 63 ML/MIN/1.73M2
GLUCOSE BLD-MCNC: 106 MG/DL (ref 70–125)
HCT VFR BLD AUTO: 38.3 % (ref 35–47)
HGB BLD-MCNC: 12.7 G/DL (ref 11.7–15.7)
HOLD SPECIMEN: NORMAL
IMM GRANULOCYTES # BLD: 0 10E3/UL
IMM GRANULOCYTES NFR BLD: 0 %
LIPASE SERPL-CCNC: 55 U/L (ref 0–52)
LYMPHOCYTES # BLD AUTO: 2.4 10E3/UL (ref 0.8–5.3)
LYMPHOCYTES NFR BLD AUTO: 39 %
MCH RBC QN AUTO: 29.3 PG (ref 26.5–33)
MCHC RBC AUTO-ENTMCNC: 33.2 G/DL (ref 31.5–36.5)
MCV RBC AUTO: 88 FL (ref 78–100)
MONOCYTES # BLD AUTO: 0.6 10E3/UL (ref 0–1.3)
MONOCYTES NFR BLD AUTO: 10 %
NEUTROPHILS # BLD AUTO: 2.8 10E3/UL (ref 1.6–8.3)
NEUTROPHILS NFR BLD AUTO: 46 %
NRBC # BLD AUTO: 0 10E3/UL
NRBC BLD AUTO-RTO: 0 /100
PLATELET # BLD AUTO: 243 10E3/UL (ref 150–450)
POTASSIUM BLD-SCNC: 4.1 MMOL/L (ref 3.5–5)
PROT SERPL-MCNC: 6.5 G/DL (ref 6–8)
RBC # BLD AUTO: 4.34 10E6/UL (ref 3.8–5.2)
SODIUM SERPL-SCNC: 139 MMOL/L (ref 136–145)
TROPONIN I SERPL-MCNC: <0.01 NG/ML (ref 0–0.29)
WBC # BLD AUTO: 6 10E3/UL (ref 4–11)

## 2022-02-13 PROCEDURE — 85025 COMPLETE CBC W/AUTO DIFF WBC: CPT | Performed by: EMERGENCY MEDICINE

## 2022-02-13 PROCEDURE — 36415 COLL VENOUS BLD VENIPUNCTURE: CPT | Performed by: EMERGENCY MEDICINE

## 2022-02-13 PROCEDURE — 83690 ASSAY OF LIPASE: CPT | Performed by: EMERGENCY MEDICINE

## 2022-02-13 PROCEDURE — 93005 ELECTROCARDIOGRAM TRACING: CPT | Performed by: EMERGENCY MEDICINE

## 2022-02-13 PROCEDURE — 99284 EMERGENCY DEPT VISIT MOD MDM: CPT

## 2022-02-13 PROCEDURE — 84484 ASSAY OF TROPONIN QUANT: CPT | Performed by: EMERGENCY MEDICINE

## 2022-02-13 PROCEDURE — 85379 FIBRIN DEGRADATION QUANT: CPT | Performed by: EMERGENCY MEDICINE

## 2022-02-13 PROCEDURE — 84155 ASSAY OF PROTEIN SERUM: CPT | Performed by: EMERGENCY MEDICINE

## 2022-02-13 ASSESSMENT — MIFFLIN-ST. JEOR: SCORE: 1410.44

## 2022-02-14 ENCOUNTER — TRANSFERRED RECORDS (OUTPATIENT)
Dept: HEALTH INFORMATION MANAGEMENT | Facility: CLINIC | Age: 67
End: 2022-02-14
Payer: COMMERCIAL

## 2022-02-14 VITALS
TEMPERATURE: 98.2 F | SYSTOLIC BLOOD PRESSURE: 131 MMHG | BODY MASS INDEX: 36.8 KG/M2 | DIASTOLIC BLOOD PRESSURE: 76 MMHG | OXYGEN SATURATION: 98 % | HEIGHT: 62 IN | HEART RATE: 71 BPM | RESPIRATION RATE: 25 BRPM | WEIGHT: 200 LBS

## 2022-02-14 LAB
ATRIAL RATE - MUSE: 73 BPM
DIASTOLIC BLOOD PRESSURE - MUSE: NORMAL MMHG
INTERPRETATION ECG - MUSE: NORMAL
P AXIS - MUSE: 16 DEGREES
PR INTERVAL - MUSE: 176 MS
QRS DURATION - MUSE: 72 MS
QT - MUSE: 410 MS
QTC - MUSE: 451 MS
R AXIS - MUSE: 41 DEGREES
SYSTOLIC BLOOD PRESSURE - MUSE: NORMAL MMHG
T AXIS - MUSE: 46 DEGREES
VENTRICULAR RATE- MUSE: 73 BPM

## 2022-02-14 NOTE — ED TRIAGE NOTES
Left sided chest pain that started about 3 hours ago. Pain does not radiate anywhere. No other symptoms. Denies cardiac history.

## 2022-02-14 NOTE — ED PROVIDER NOTES
NAME: Kandi Kim  AGE: 66 year old female  YOB: 1955  MRN: [id number]  EVALUATION DATE & TIME: 2/13/2022  9:56 PM    PCP: Ho Quezada    ED PROVIDER: Link Chowdhury M.D.      Chief Complaint   Patient presents with     Chest Pain     FINAL IMPRESSION:  1. Atypical chest pain      MEDICAL DECISION MAKING:    10:25 PM I met with the patient, obtained history, performed an initial exam, and discussed options and plan for diagnostics and treatment here in the ED.   11:09 PM I rechecked and updated patient.  11:57 PM I rechecked and updated patient on results. We discussed the plan for discharge and the patient is agreeable. Reviewed supportive cares, symptomatic treatment, outpatient follow up, and reasons to return to the Emergency Department. Patient to be discharged by ED RN.     Patient was clinically assessed and consented to treatment. After assessment, medical decision making and workup were discussed with the patient. The patient was agreeable to plan for testing, workup, and treatment.  Pertinent Labs & Imaging studies reviewed. (See chart for details)     Kandi Kim is a 66 year old female who presents with chest pain.   Differential diagnosis includes but not limited to acute coronary syndrome, pulmonary embolism, pleuritis, GERD with esophagitis.  Patient with acute chest pain now resolving and atypical in nature.  Patient otherwise well-appearing no acute distress and on palpation of the left chest pain is slightly reproducible.  Patient does have history of esophageal irritation and GERD which could be contributing to her symptoms.  Symptoms almost completely gone now and will proceed with work-up.  EKG did not show any signs of acute ischemia.  Labs were remarkable for negative troponin, negative D-dimer, unremarkable metabolic panel, lipase, and normal CBC.  Lung sounds were clear bilaterally and patient without any symptoms on recheck and discussion of her  labs felt well enough to go home.  I discussed with her risk factors and given low risk factors as well as negative work-up so far here felt comfortable with patient going home and proceed with outpatient testing.  Patient was given referral for rapid access and at this time will cancel  x-ray given her clear lung sounds and no symptoms at this time.  Patient comfortable with plan for discharge home and follow-up with her primary doctor as well as with GI for repeat endoscopy that she is due for.    0 minutes of critical care time    MEDICATIONS GIVEN IN THE EMERGENCY:  Medications - No data to display    NEW PRESCRIPTIONS STARTED AT TODAY'S ER VISIT:  Discharge Medication List as of 2/14/2022 12:01 AM             =================================================================    HPI    Patient information was obtained from: Patient    Use of : N/A       Kandi Kim is a 66 year old female with a past medical history of HTN, hyperlipidemia, GERD, hypothyroidism, HONEY, solitary kidney, who presents to the ED via walk-in for the evaluation of chest pain.     Patient reports non-radiating left-sided chest pain that started three hours ago. She states that pain was initially intermittent but then became constant. Patient describes pain as sharp and burning but denies any chest pain with deep breathing. Currently, patient reports that chest pain has subsided and notes she took an Ibuprofen about an hour ago.     Patient denies any cardiac history but is on blood pressure medications. She states that her dad has a cardiac history but her mom does not. Patient also reports a history of an inflamed esophagus and states she is scheduled for an upper endoscopy in a month. Patient does not smoke. No other complaints at this time.    REVIEW OF SYSTEMS   Review of Systems   Cardiovascular: Positive for chest pain (left-sided).   All other systems reviewed and are negative.     PAST MEDICAL HISTORY:  Past  Medical History:   Diagnosis Date     Acute torn meniscus of knee, left, initial encounter 02/08/2018     Acute torn meniscus of knee, left, sequela 05/14/2019     Bilateral hearing loss 03/26/2021    Hearing aids     Chronic left shoulder pain 5/3/2021     Chronic sinusitis      Diverticulitis 08/09/2016    CT scan normal     GERD with esophagitis     Complaints of dysphagia with EGD showing esophagitis.  Negative for eosinophilic esophagitis September 2019     History of colon polyps     Colonoscopy December 2018 with multiple polyps.  Repeat in 3 years.  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     Migraines      HONEY (obstructive sleep apnea)      Osteoarthritis     knees, hands     PONV (postoperative nausea and vomiting)      Postmenopausal symptoms      Primary osteoarthritis of both knees 03/26/2021    Bilateral TKA     Primary osteoarthritis of right knee 04/12/2016    R TKA     Rectal bleeding 08/09/2016     Rectocele 11/11/2016    Surgery is planned     Screening     DEXA normal 2016     Situational anxiety        PAST SURGICAL HISTORY:  Past Surgical History:   Procedure Laterality Date     ARTHROPLASTY KNEE Right 04/2016     ARTHROPLASTY KNEE Left     jan 2020     ARTHROSCOPY KNEE Bilateral 2011 2014     BUNIONECTOMY       COLPORRHAPHY N/A 02/21/2017    Procedure: POSTERIOR COLPORRHAPHY;  Surgeon: Roxana Frias MD;  Location: [facility];  Service:      HYSTERECTOMY VAGINAL N/A 02/21/2017    Procedure: TOTAL VAGINAL HYSTERECTOMY, BILATERAL SALPINGO-OOPHORECTOMY;  Surgeon: Roxana Frias MD;  Location: [facility];  Service:      MIDDLE EAR SURGERY       NEPHRECTOMY Left     Kidney donor     OOPHORECTOMY       TUBAL LIGATION         CURRENT MEDICATIONS:    No current facility-administered medications for this encounter.    Current Outpatient Medications:      aspirin 81 MG EC tablet,  [ASPIRIN 81 MG EC TABLET] Take 81 mg by mouth daily., Disp: , Rfl:      atorvastatin (LIPITOR) 20 MG tablet, Take 1 tablet (20 mg) by mouth daily, Disp: 90 tablet, Rfl: 2     citalopram (CELEXA) 20 MG tablet, [CITALOPRAM (CELEXA) 20 MG TABLET] TAKE 1 TABLET BY MOUTH EVERY DAY, Disp: 90 tablet, Rfl: 3     levothyroxine (SYNTHROID) 112 MCG tablet, [LEVOTHYROXINE (SYNTHROID) 112 MCG TABLET] Take 1 tablet (112 mcg total) by mouth daily., Disp: 90 tablet, Rfl: 3     lisinopriL (PRINIVIL,ZESTRIL) 10 MG tablet, [LISINOPRIL (PRINIVIL,ZESTRIL) 10 MG TABLET] Take 1 tablet (10 mg total) by mouth daily., Disp: 90 tablet, Rfl: 0     omeprazole (PRILOSEC) 20 MG DR capsule, Take 1 capsule (20 mg) by mouth daily, Disp: 90 capsule, Rfl: 3    ALLERGIES:  No Known Allergies    FAMILY HISTORY:  Family History   Problem Relation Age of Onset     Cerebrovascular Disease Mother      Breast Cancer Sister 62.00     Breast Cancer Cousin         age in 50's       SOCIAL HISTORY:   Social History     Socioeconomic History     Marital status:      Spouse name: Not on file     Number of children: Not on file     Years of education: Not on file     Highest education level: Not on file   Occupational History     Not on file   Tobacco Use     Smoking status: Never Smoker     Smokeless tobacco: Never Used   Substance and Sexual Activity     Alcohol use: Yes     Comment: Alcoholic Drinks/day: infrequent     Drug use: No     Sexual activity: Not on file   Other Topics Concern     Not on file   Social History Narrative    , [place]   1 child  3M       Social Determinants of Health     Financial Resource Strain: Not on file   Food Insecurity: Not on file   Transportation Needs: Not on file   Physical Activity: Not on file   Stress: Not on file   Social Connections: Not on file   Intimate Partner Violence: Not on file   Housing Stability: Not on file       PHYSICAL EXAM:    Vitals: /76   Pulse 71   Temp 98.2  F (36.8  C) (Oral)    "Resp 25   Ht 1.575 m (5' 2\")   Wt 90.7 kg (200 lb)   SpO2 98%   BMI 36.58 kg/m     Physical Exam  Vitals and nursing note reviewed.   Constitutional:       General: She is not in acute distress.     Appearance: She is well-developed and normal weight. She is not ill-appearing, toxic-appearing or diaphoretic.   HENT:      Head: Normocephalic.   Neck:      Thyroid: No thyromegaly.   Cardiovascular:      Rate and Rhythm: Normal rate and regular rhythm.      Heart sounds: Normal heart sounds.   Pulmonary:      Effort: Pulmonary effort is normal. No tachypnea or respiratory distress.      Breath sounds: Normal breath sounds. No stridor.   Chest:      Chest wall: No tenderness.       Abdominal:      Palpations: Abdomen is soft.      Tenderness: There is no abdominal tenderness.   Musculoskeletal:      Right lower leg: No tenderness. No edema.      Left lower leg: No tenderness. No edema.   Skin:     General: Skin is warm and dry.      Coloration: Skin is not pale.   Neurological:      General: No focal deficit present.      Mental Status: She is alert.   Psychiatric:         Mood and Affect: Mood normal.           LAB:  All pertinent labs reviewed and interpreted.  Labs Ordered and Resulted from Time of ED Arrival to Time of ED Departure   D DIMER QUANTITATIVE - Abnormal       Result Value    D-Dimer Quantitative 0.53 (*)    COMPREHENSIVE METABOLIC PANEL - Abnormal    Sodium 139      Potassium 4.1      Chloride 109 (*)     Carbon Dioxide (CO2) 21 (*)     Anion Gap 9      Urea Nitrogen 16      Creatinine 0.99      Calcium 9.0      Glucose 106      Alkaline Phosphatase 133 (*)     AST 17      ALT 19      Protein Total 6.5      Albumin 3.8      Bilirubin Total 0.3      GFR Estimate 63     LIPASE - Abnormal    Lipase 55 (*)    TROPONIN I - Normal    Troponin I <0.01     CBC WITH PLATELETS AND DIFFERENTIAL    WBC Count 6.0      RBC Count 4.34      Hemoglobin 12.7      Hematocrit 38.3      MCV 88      MCH 29.3      MCHC " 33.2      RDW 12.1      Platelet Count 243      % Neutrophils 46      % Lymphocytes 39      % Monocytes 10      % Eosinophils 4      % Basophils 1      % Immature Granulocytes 0      NRBCs per 100 WBC 0      Absolute Neutrophils 2.8      Absolute Lymphocytes 2.4      Absolute Monocytes 0.6      Absolute Eosinophils 0.2      Absolute Basophils 0.0      Absolute Immature Granulocytes 0.0      Absolute NRBCs 0.0         RADIOLOGY:  No orders to display       EKG:   Performed at: [facility]. 13-Feb-2022, 22:03:03.    Impression: Sinus rhythm with no signs of acute ST elevation ischemia, no irregular rhythm, no signs of fetal arrhythmia.  Rate: 73 BPM  Rhythm: Sinus rhythm  QRS Interval: 72 ms  QTc Interval: 451 ms  Comparison: Compared with ECG of 06-Jan-2020, 08:48. No significant change was found.     I have independently reviewed and interpreted the EKG(s) documented above.     PROCEDURES:   Procedures       I, Meghana Patrick, am serving as a scribe to document services personally performed by Dr. Link Chowdhury  based on my observation and the provider's statements to me. I, Link Chowdhury MD attest that Meghana Patrick is acting in a scribe capacity, has observed my performance of the services and has documented them in accordance with my direction.      Link Chowdhury M.D.  Emergency Medicine  [facility]     Link Chowdhury MD  02/15/22 0029

## 2022-02-25 ENCOUNTER — TRANSFERRED RECORDS (OUTPATIENT)
Dept: HEALTH INFORMATION MANAGEMENT | Facility: CLINIC | Age: 67
End: 2022-02-25
Payer: COMMERCIAL

## 2022-03-02 ENCOUNTER — TELEPHONE (OUTPATIENT)
Dept: INTERNAL MEDICINE | Facility: CLINIC | Age: 67
End: 2022-03-02
Payer: COMMERCIAL

## 2022-03-02 NOTE — TELEPHONE ENCOUNTER
Reason for Call:  Other call back    Detailed comments: Pt is requesting a call back about her recent ER visit - she was referred to cardiology and suggested for a stress test but she is unsure about this. Wanted to get a 2nd opinion and thoughts about this, please advise.    Phone Number Patient can be reached at: Home number on file [phone] (home)    Best Time: any    Can we leave a detailed message on this number? YES    Call taken on 3/2/2022 at 4:25 PM by Jose Cole

## 2022-03-03 NOTE — TELEPHONE ENCOUNTER
She should schedule a follow-up appointment with me to further discuss.  20-minute same-day slot can be used in the next several days.

## 2022-03-06 ENCOUNTER — APPOINTMENT (OUTPATIENT)
Dept: RADIOLOGY | Facility: CLINIC | Age: 67
End: 2022-03-06
Payer: COMMERCIAL

## 2022-03-06 ENCOUNTER — HOSPITAL ENCOUNTER (EMERGENCY)
Facility: CLINIC | Age: 67
Discharge: HOME | End: 2022-03-06
Admitting: EMERGENCY MEDICINE
Payer: COMMERCIAL

## 2022-03-06 VITALS
RESPIRATION RATE: 18 BRPM | OXYGEN SATURATION: 96 % | SYSTOLIC BLOOD PRESSURE: 112 MMHG | WEIGHT: 200 LBS | HEIGHT: 62 IN | BODY MASS INDEX: 36.8 KG/M2 | DIASTOLIC BLOOD PRESSURE: 68 MMHG | TEMPERATURE: 98.6 F | HEART RATE: 79 BPM

## 2022-03-06 DIAGNOSIS — J10.1 INFLUENZA A: ICD-10-CM

## 2022-03-06 LAB
ALBUMIN SERPL-MCNC: 3.8 G/DL (ref 3.5–5)
ALBUMIN UR-MCNC: 20 MG/DL
ALP SERPL-CCNC: 104 U/L (ref 45–120)
ALT SERPL W P-5'-P-CCNC: 19 U/L (ref 0–45)
ANION GAP SERPL CALCULATED.3IONS-SCNC: 12 MMOL/L (ref 5–18)
APPEARANCE UR: CLEAR
AST SERPL W P-5'-P-CCNC: 21 U/L (ref 0–40)
BACTERIA #/AREA URNS HPF: ABNORMAL /HPF
BASOPHILS # BLD AUTO: 0 10E3/UL (ref 0–0.2)
BASOPHILS NFR BLD AUTO: 0 %
BILIRUB SERPL-MCNC: 0.5 MG/DL (ref 0–1)
BILIRUB UR QL STRIP: NEGATIVE
BUN SERPL-MCNC: 13 MG/DL (ref 8–22)
CALCIUM SERPL-MCNC: 9 MG/DL (ref 8.5–10.5)
CHLORIDE BLD-SCNC: 102 MMOL/L (ref 98–107)
CO2 SERPL-SCNC: 21 MMOL/L (ref 22–31)
COLOR UR AUTO: ABNORMAL
CREAT SERPL-MCNC: 1.17 MG/DL (ref 0.6–1.1)
EOSINOPHIL # BLD AUTO: 0 10E3/UL (ref 0–0.7)
EOSINOPHIL NFR BLD AUTO: 0 %
ERYTHROCYTE [DISTWIDTH] IN BLOOD BY AUTOMATED COUNT: 12.6 % (ref 10–15)
FLUAV RNA SPEC QL NAA+PROBE: POSITIVE
FLUBV RNA RESP QL NAA+PROBE: NEGATIVE
GFR SERPL CREATININE-BSD FRML MDRD: 52 ML/MIN/1.73M2
GLUCOSE BLD-MCNC: 105 MG/DL (ref 70–125)
GLUCOSE UR STRIP-MCNC: NEGATIVE MG/DL
HCT VFR BLD AUTO: 36.9 % (ref 35–47)
HGB BLD-MCNC: 12.2 G/DL (ref 11.7–15.7)
HGB UR QL STRIP: ABNORMAL
IMM GRANULOCYTES # BLD: 0.1 10E3/UL
IMM GRANULOCYTES NFR BLD: 1 %
KETONES UR STRIP-MCNC: NEGATIVE MG/DL
LEUKOCYTE ESTERASE UR QL STRIP: NEGATIVE
LYMPHOCYTES # BLD AUTO: 0.3 10E3/UL (ref 0.8–5.3)
LYMPHOCYTES NFR BLD AUTO: 6 %
MCH RBC QN AUTO: 29.3 PG (ref 26.5–33)
MCHC RBC AUTO-ENTMCNC: 33.1 G/DL (ref 31.5–36.5)
MCV RBC AUTO: 89 FL (ref 78–100)
MONOCYTES # BLD AUTO: 0.7 10E3/UL (ref 0–1.3)
MONOCYTES NFR BLD AUTO: 12 %
MUCOUS THREADS #/AREA URNS LPF: PRESENT /LPF
NEUTROPHILS # BLD AUTO: 4.4 10E3/UL (ref 1.6–8.3)
NEUTROPHILS NFR BLD AUTO: 81 %
NITRATE UR QL: NEGATIVE
NRBC # BLD AUTO: 0 10E3/UL
NRBC BLD AUTO-RTO: 0 /100
PH UR STRIP: 5.5 [PH] (ref 5–7)
PLATELET # BLD AUTO: 197 10E3/UL (ref 150–450)
POTASSIUM BLD-SCNC: 4.3 MMOL/L (ref 3.5–5)
PROT SERPL-MCNC: 6.9 G/DL (ref 6–8)
RBC # BLD AUTO: 4.16 10E6/UL (ref 3.8–5.2)
RBC URINE: 3 /HPF
SARS-COV-2 RNA RESP QL NAA+PROBE: NEGATIVE
SODIUM SERPL-SCNC: 135 MMOL/L (ref 136–145)
SP GR UR STRIP: 1.02 (ref 1–1.03)
SQUAMOUS EPITHELIAL: 1 /HPF
UROBILINOGEN UR STRIP-MCNC: <2 MG/DL
WBC # BLD AUTO: 5.5 10E3/UL (ref 4–11)
WBC URINE: 1 /HPF

## 2022-03-06 PROCEDURE — 36415 COLL VENOUS BLD VENIPUNCTURE: CPT | Performed by: EMERGENCY MEDICINE

## 2022-03-06 PROCEDURE — C9803 HOPD COVID-19 SPEC COLLECT: HCPCS

## 2022-03-06 PROCEDURE — 85025 COMPLETE CBC W/AUTO DIFF WBC: CPT | Performed by: EMERGENCY MEDICINE

## 2022-03-06 PROCEDURE — 71046 X-RAY EXAM CHEST 2 VIEWS: CPT

## 2022-03-06 PROCEDURE — 250N000013 HC RX MED GY IP 250 OP 250 PS 637: Performed by: EMERGENCY MEDICINE

## 2022-03-06 PROCEDURE — 250N000011 HC RX IP 250 OP 636: Performed by: EMERGENCY MEDICINE

## 2022-03-06 PROCEDURE — 258N000003 HC RX IP 258 OP 636: Performed by: EMERGENCY MEDICINE

## 2022-03-06 PROCEDURE — 96375 TX/PRO/DX INJ NEW DRUG ADDON: CPT

## 2022-03-06 PROCEDURE — 96374 THER/PROPH/DIAG INJ IV PUSH: CPT

## 2022-03-06 PROCEDURE — 87636 SARSCOV2 & INF A&B AMP PRB: CPT | Performed by: EMERGENCY MEDICINE

## 2022-03-06 PROCEDURE — 99284 EMERGENCY DEPT VISIT MOD MDM: CPT | Mod: 25

## 2022-03-06 PROCEDURE — 96361 HYDRATE IV INFUSION ADD-ON: CPT

## 2022-03-06 PROCEDURE — 80053 COMPREHEN METABOLIC PANEL: CPT | Performed by: EMERGENCY MEDICINE

## 2022-03-06 PROCEDURE — 81001 URINALYSIS AUTO W/SCOPE: CPT | Performed by: EMERGENCY MEDICINE

## 2022-03-06 RX ORDER — IBUPROFEN 600 MG/1
600 TABLET, FILM COATED ORAL ONCE
Status: COMPLETED | OUTPATIENT
Start: 2022-03-06 | End: 2022-03-06

## 2022-03-06 RX ORDER — ACETAMINOPHEN 325 MG/1
975 TABLET ORAL ONCE
Status: COMPLETED | OUTPATIENT
Start: 2022-03-06 | End: 2022-03-06

## 2022-03-06 RX ORDER — OSELTAMIVIR PHOSPHATE 75 MG/1
75 CAPSULE ORAL 2 TIMES DAILY
Qty: 10 CAPSULE | Refills: 0 | Status: SHIPPED | OUTPATIENT
Start: 2022-03-06 | End: 2022-03-11

## 2022-03-06 RX ORDER — ONDANSETRON 2 MG/ML
4 INJECTION INTRAMUSCULAR; INTRAVENOUS ONCE
Status: COMPLETED | OUTPATIENT
Start: 2022-03-06 | End: 2022-03-06

## 2022-03-06 RX ORDER — KETOROLAC TROMETHAMINE 15 MG/ML
15 INJECTION, SOLUTION INTRAMUSCULAR; INTRAVENOUS ONCE
Status: COMPLETED | OUTPATIENT
Start: 2022-03-06 | End: 2022-03-06

## 2022-03-06 RX ORDER — ONDANSETRON 4 MG/1
4 TABLET, ORALLY DISINTEGRATING ORAL EVERY 8 HOURS PRN
Qty: 10 TABLET | Refills: 0 | Status: SHIPPED | OUTPATIENT
Start: 2022-03-06 | End: 2022-03-09

## 2022-03-06 RX ADMIN — KETOROLAC TROMETHAMINE 15 MG: 15 INJECTION, SOLUTION INTRAMUSCULAR; INTRAVENOUS at 13:46

## 2022-03-06 RX ADMIN — SODIUM CHLORIDE 1000 ML: 9 INJECTION, SOLUTION INTRAVENOUS at 13:54

## 2022-03-06 RX ADMIN — IBUPROFEN 600 MG: 600 TABLET ORAL at 15:50

## 2022-03-06 RX ADMIN — ACETAMINOPHEN 975 MG: 325 TABLET ORAL at 13:38

## 2022-03-06 RX ADMIN — ONDANSETRON 4 MG: 2 INJECTION INTRAMUSCULAR; INTRAVENOUS at 13:47

## 2022-03-06 ASSESSMENT — ENCOUNTER SYMPTOMS
HEMATURIA: 0
CHILLS: 1
DIARRHEA: 0
VOMITING: 0
NAUSEA: 1
SORE THROAT: 0
FEVER: 1
DYSURIA: 0
ABDOMINAL PAIN: 0
SHORTNESS OF BREATH: 0
HEADACHES: 1
COUGH: 1

## 2022-03-06 NOTE — ED PROVIDER NOTES
EMERGENCY DEPARTMENT ENCOUNTER      NAME: Kandi Kim  AGE: 66 year old female  YOB: 1955  MRN: [id number]  EVALUATION DATE & TIME: 3/6/2022  1:01 PM    PCP: Ho Quezada    ED PROVIDER: Karol Bains PA-C      Chief Complaint   Patient presents with     Fever         FINAL IMPRESSION:  1. Influenza A          ED COURSE & MEDICAL DECISION MAKING:    Pertinent Labs & Imaging studies reviewed. (See chart for details)    66 year old female presents to the Emergency Department for evaluation of fever.    Physical exam is remarkable for an ill but nontoxic-appearing female.  Heart and lung sounds are remarkable for some crackles in the right lower lung, no respiratory distress.  Abdomen is soft and nontender.  Oropharynx is unremarkable appearing.  Nonfocal neurologic exam.  Vital signs are stable, she is febrile with temperature of 101.4  F.    CBC is unremarkable with no leukocytosis or anemia.  CMP is unremarkable with no significant electrolyte derangements, very mild kidney dysfunction with creatinine of 1.17 and GFR of 52-this appears consistent with the patient's baseline. Urinalysis without evidence of infection.  Chest x-ray is unremarkable with no pneumonia.  Covid/flu swab is positive for influenza A.    The patient was given IV fluids, Tylenol, Toradol, and Zofran with improvement in her symptoms.  I do not think any further emergent labs or imaging are indicated at this time.  She is hemodynamically stable with no evidence of sepsis.  Her lab work is overall reassuring and I do suspect her symptoms are secondary to influenza.  Discussed treatment with Tamiflu, patient did opt for treatment.  Prescription for Tamiflu and Zofran sent to her pharmacy.  Advised extra fluids and rest, recommend follow-up with her primary care provider this week for recheck.  Recommend return to the ED for any new or worsening symptoms.  The patient is agreeable with this treatment plan and  verbalized her understanding.    ED Course   1:10 PM Performed my initial history and physical exam. Discussed workup in the emergency department, management of symptoms, and likely disposition.   3:02 PM Updated patient and rechecked her, she is feeling improved. I discussed the plan for discharge with the patient, and patient is agreeable. We discussed supportive cares at home and reasons for return to the ER including new or worsening symptoms - all questions and concerns addressed. Patient to be discharged by RN.    At the conclusion of the encounter I discussed the results of all of the tests and the disposition. The questions were answered. The patient or family acknowledged understanding and was agreeable with the care plan.     Voice recognition software was used in the creation of this note. Any grammatical or nonsensical errors are due to inherent errors with the software and are not the intention of the writer.     MEDICATIONS GIVEN IN THE EMERGENCY:  Medications   ketorolac (TORADOL) injection 15 mg (15 mg Intravenous Given 3/6/22 1346)   ondansetron (ZOFRAN) injection 4 mg (4 mg Intravenous Given 3/6/22 1347)   acetaminophen (TYLENOL) tablet 975 mg (975 mg Oral Given 3/6/22 1338)   0.9% sodium chloride BOLUS (1,000 mLs Intravenous New Bag 3/6/22 1354)       NEW PRESCRIPTIONS STARTED AT TODAY'S ER VISIT  New Prescriptions    ONDANSETRON (ZOFRAN ODT) 4 MG ODT TAB    Take 1 tablet (4 mg) by mouth every 8 hours as needed for nausea    OSELTAMIVIR (TAMIFLU) 75 MG CAPSULE    Take 1 capsule (75 mg) by mouth 2 times daily for 5 days            =================================================================    HPI    Patient information was obtained from: Patient    Use of : N/A         Kandi Kim is a 66 year old female with PMH of HTN, HONEY, GERD, solitary kidney who presents to the ED via EMS for evaluation of a fever.     The patient reports that yesterday, she developed onset of a  fever.  She has a productive cough and nasal congestion as well.  She also endorses a frontal headache today.  She has had a very poor appetite and states she almost passed out in the shower this morning which prompts her presentation.  She also endorses nausea.  She took Tylenol yesterday but none today.  She denies any hemoptysis, chest pain, shortness of breath, abdominal pain, diarrhea, dysuria, hematuria, or sore throat.  No known sick contacts.  Patient is fully vaccinated and boosted against COVID19, she also received a flu shot this year.      REVIEW OF SYSTEMS   Review of Systems   Constitutional: Positive for chills and fever.   HENT: Positive for congestion. Negative for sore throat.    Respiratory: Positive for cough. Negative for shortness of breath.    Cardiovascular: Negative for chest pain.   Gastrointestinal: Positive for nausea. Negative for abdominal pain, diarrhea and vomiting.   Genitourinary: Negative for dysuria and hematuria.   Allergic/Immunologic: Negative for immunocompromised state.   Neurological: Positive for headaches.       All other systems reviewed and are negative unless noted in HPI.       PAST MEDICAL HISTORY:  Past Medical History:   Diagnosis Date     Acute torn meniscus of knee, left, initial encounter 02/08/2018     Acute torn meniscus of knee, left, sequela 05/14/2019     Bilateral hearing loss 03/26/2021    Hearing aids     Chronic left shoulder pain 5/3/2021     Chronic sinusitis      Diverticulitis 08/09/2016    CT scan normal     GERD with esophagitis     Complaints of dysphagia with EGD showing esophagitis.  Negative for eosinophilic esophagitis September 2019     History of colon polyps     Colonoscopy December 2018 with multiple polyps.  Repeat in 3 years.  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     Migraines      HONEY (obstructive sleep apnea)      Osteoarthritis      knees, hands     PONV (postoperative nausea and vomiting)      Postmenopausal symptoms      Primary osteoarthritis of both knees 03/26/2021    Bilateral TKA     Primary osteoarthritis of right knee 04/12/2016    R TKA     Rectal bleeding 08/09/2016     Rectocele 11/11/2016    Surgery is planned     Screening     DEXA normal 2016     Situational anxiety        PAST SURGICAL HISTORY:  Past Surgical History:   Procedure Laterality Date     ARTHROPLASTY KNEE Right 04/2016     ARTHROPLASTY KNEE Left     jan 2020     ARTHROSCOPY KNEE Bilateral 2011 2014     BUNIONECTOMY       COLPORRHAPHY N/A 02/21/2017    Procedure: POSTERIOR COLPORRHAPHY;  Surgeon: Roxana Frias MD;  Location: [facility];  Service:      HYSTERECTOMY VAGINAL N/A 02/21/2017    Procedure: TOTAL VAGINAL HYSTERECTOMY, BILATERAL SALPINGO-OOPHORECTOMY;  Surgeon: Roxana Frias MD;  Location: [facility];  Service:      MIDDLE EAR SURGERY       NEPHRECTOMY Left     Kidney donor     OOPHORECTOMY       TUBAL LIGATION         CURRENT MEDICATIONS:    ondansetron (ZOFRAN ODT) 4 MG ODT tab  oseltamivir (TAMIFLU) 75 MG capsule  aspirin 81 MG EC tablet  atorvastatin (LIPITOR) 20 MG tablet  citalopram (CELEXA) 20 MG tablet  levothyroxine (SYNTHROID) 112 MCG tablet  lisinopriL (PRINIVIL,ZESTRIL) 10 MG tablet  omeprazole (PRILOSEC) 20 MG DR capsule        ALLERGIES:  No Known Allergies    FAMILY HISTORY:  Family History   Problem Relation Age of Onset     Cerebrovascular Disease Mother      Breast Cancer Sister 62.00     Breast Cancer Cousin         age in 50's       SOCIAL HISTORY:   Social History     Socioeconomic History     Marital status:      Spouse name: Not on file     Number of children: Not on file     Years of education: Not on file     Highest education level: Not on file   Occupational History     Not on file   Tobacco Use     Smoking status: Never Smoker     Smokeless tobacco: Never Used   Substance and Sexual Activity      "Alcohol use: Yes     Comment: Alcoholic Drinks/day: infrequent     Drug use: No     Sexual activity: Not on file   Other Topics Concern     Not on file   Social History Narrative    , [place]   1 child  3M       Social Determinants of Health     Financial Resource Strain: Not on file   Food Insecurity: Not on file   Transportation Needs: Not on file   Physical Activity: Not on file   Stress: Not on file   Social Connections: Not on file   Intimate Partner Violence: Not on file   Housing Stability: Not on file       VITALS:  Patient Vitals for the past 24 hrs:   BP Temp Temp src Pulse Resp SpO2 Height Weight   03/06/22 1512 -- 98.6  F (37  C) Oral -- -- -- -- --   03/06/22 1510 124/59 -- -- 88 -- 96 % -- --   03/06/22 1503 -- -- -- 83 -- 93 % -- --   03/06/22 1308 138/66 (!) 101.4  F (38.6  C) Oral 98 18 98 % 1.575 m (5' 2\") 90.7 kg (200 lb)       PHYSICAL EXAM    VITAL SIGNS: /59   Pulse 88   Temp 98.6  F (37  C) (Oral)   Resp 18   Ht 1.575 m (5' 2\")   Wt 90.7 kg (200 lb)   SpO2 96%   BMI 36.58 kg/m    General Appearance: Ill but non-toxic appearing; Alert, cooperative, normal speech and facial symmetry, appears stated age  Head:  Normocephalic, without obvious abnormality, atraumatic  Eyes: Conjunctiva/corneas clear, EOM's intact, no nystagmus, PERRL  ENT:  Lips, mucosa, and tongue normal; teeth and gums normal, no pharyngeal inflammation, no dysphonia or difficulty swallowing, membranes are moist without pallor  Neck:  Supple, symmetrical, trachea midline, no nuchal rigidity  Cardio:  Regular rate and rhythm, S1 and S2 normal, no murmur, rub    or gallop, 2+ pulses symmetric in all extremities  Pulm: Crackles in right lower lung; respirations unlabored with no accessory muscle use  Abdomen:  Abdomen is soft, non-distended with no tenderness to palpation, rebound tenderness, or guarding.   Extremities:  Extremities normal, there is no tenderness to palpation , atraumatic, no cyanosis or edema, " full function and range of motion, pulses equal in all extremities, normal cap refill, no joint swelling  Neuro: Patient is awake, alert, and responsive to voice. No gross motor weaknesses or sensory loss; moves all extremities.     LAB:  All pertinent labs reviewed and interpreted.  Labs Ordered and Resulted from Time of ED Arrival to Time of ED Departure   ROUTINE UA WITH MICROSCOPIC REFLEX TO CULTURE - Abnormal       Result Value    Color Urine Light Yellow      Appearance Urine Clear      Glucose Urine Negative      Bilirubin Urine Negative      Ketones Urine Negative      Specific Gravity Urine 1.016      Blood Urine 0.1 mg/dL (*)     pH Urine 5.5      Protein Albumin Urine 20  (*)     Urobilinogen Urine <2.0      Nitrite Urine Negative      Leukocyte Esterase Urine Negative      Bacteria Urine Few (*)     Mucus Urine Present (*)     RBC Urine 3 (*)     WBC Urine 1      Squamous Epithelials Urine 1     COMPREHENSIVE METABOLIC PANEL - Abnormal    Sodium 135 (*)     Potassium 4.3      Chloride 102      Carbon Dioxide (CO2) 21 (*)     Anion Gap 12      Urea Nitrogen 13      Creatinine 1.17 (*)     Calcium 9.0      Glucose 105      Alkaline Phosphatase 104      AST 21      ALT 19      Protein Total 6.9      Albumin 3.8      Bilirubin Total 0.5      GFR Estimate 52 (*)    INFLUENZA A/B & SARS-COV2 PCR MULTIPLEX - Abnormal    Influenza A PCR Positive (*)     Influenza B PCR Negative      SARS CoV2 PCR Negative     CBC WITH PLATELETS AND DIFFERENTIAL - Abnormal    WBC Count 5.5      RBC Count 4.16      Hemoglobin 12.2      Hematocrit 36.9      MCV 89      MCH 29.3      MCHC 33.1      RDW 12.6      Platelet Count 197      % Neutrophils 81      % Lymphocytes 6      % Monocytes 12      % Eosinophils 0      % Basophils 0      % Immature Granulocytes 1      NRBCs per 100 WBC 0      Absolute Neutrophils 4.4      Absolute Lymphocytes 0.3 (*)     Absolute Monocytes 0.7      Absolute Eosinophils 0.0      Absolute Basophils 0.0       Absolute Immature Granulocytes 0.1      Absolute NRBCs 0.0         RADIOLOGY:  Reviewed all pertinent imaging. Please see official radiology report.  Chest XR,  PA & LAT   Final Result   IMPRESSION: Lungs are clear. No pleural effusion. Heart size and pulmonary vascularity within normal limits.              Karol Bains PA-C  Emergency Medicine  [facility] EMERGENCY ROOM  [address] [address]  [phone]  Dept: [phone]       Karol Bains PA-C  03/06/22 1534

## 2022-03-06 NOTE — ED NOTES
Bed: WWED-07  Expected date: 3/6/22  Expected time: 12:42 PM  Means of arrival: Ambulance  Comments:  [facility] 55 yo female fever

## 2022-03-06 NOTE — ED TRIAGE NOTES
Pt arrives to ED from EMS with c/o fever, nausea, and near syncopal episode since yesterday. Denies vomiting, diarrhea, shortness of breath, or chest pain. Pt states she almost passed out in the shower this morning.Took tylenol yesterday but none today fever of 101.4 in traige, otherwise vitally stable.

## 2022-03-06 NOTE — DISCHARGE INSTRUCTIONS
You were seen in the emergency department today for evaluation of fever.  Your lab work today is reassuring with no evidence of significant electrolyte problems or infection in your blood.  Your chest x-ray was normal.    Your influenza test was positive for influenza A, this is likely the cause of your symptoms.    You may take Tylenol and ibuprofen for pain/fever, do not exceed 4000 mg of Tylenol per day or 3200 mg ofibuprofen per day.     I will prescribe you Tamiflu, take 1 capsule twice daily for 5 days.  I will also prescribe you Zofran to take every 8 hours as needed for nausea.    Make sure you are drinking plenty of fluids and resting.  Follow-up with your primary care provider in the next few days for recheck.  Return to the ER with any new or worsening symptoms including passing out, difficulty breathing, chest pain, inability to drink fluids, fever despite Tylenol and ibuprofen, or any other symptoms that concern you.

## 2022-03-11 PROBLEM — Z86.0100 HISTORY OF COLON POLYPS: Status: ACTIVE | Noted: 2022-03-11

## 2022-03-12 ENCOUNTER — TELEPHONE (OUTPATIENT)
Dept: NURSING | Facility: CLINIC | Age: 67
End: 2022-03-12
Payer: COMMERCIAL

## 2022-03-12 NOTE — TELEPHONE ENCOUNTER
Telephone call    Was diagnosed with influenza a and wondering when they are not contagious.  She is improved with no fever and it has been 7 days.    Aure Escobar RN   [facility] Nurse Advisor  9:44 AM 3/12/2022    COVID 19 Nurse Triage Plan/Patient Instructions    Please be aware that novel coronavirus (COVID-19) may be circulating in the community. If you develop symptoms such as fever, cough, or SOB or if you have concerns about the presence of another infection including coronavirus (COVID-19), please contact your health care provider or visit [email or web address].     Disposition/Instructions    Home care recommended. Follow home care protocol based instructions.    Thank you for taking steps to prevent the spread of this virus.  o Limit your contact with others.  o Wear a simple mask to cover your cough.  o Wash your hands well and often.    Resources    [facility]: About COVID-19: [email or web address]    CDC: What to Do If You're Sick: www.cdc.gov/coronavirus/2019-ncov/about/steps-when-sick.html    CDC: Ending Home Isolation: www.cdc.gov/coronavirus/2019-ncov/hcp/disposition-in-home-patients.html     CDC: Caring for Someone: www.cdc.gov/coronavirus/2019-ncov/if-you-are-sick/care-for-someone.html     [facility]: Interim Guidance for Hospital Discharge to Home: www.health.[email or web address]/diseases/coronavirus/hcp/hospdischarge.pdf    [facility] clinical trials (COVID-19 research studies): [email or web address]     Below are the COVID-19 hotlines at the [facility] ([facility]). Interpreters are available.   o For health questions: Call 484-473-1032 or 1-405.174.2867 (7 a.m. to 7 p.m.)  o For questions about schools and childcare: Call [phone] or 1-261.364.8131 (7 a.m. to 7 p.m.)

## 2022-03-14 ENCOUNTER — OFFICE VISIT (OUTPATIENT)
Dept: INTERNAL MEDICINE | Facility: CLINIC | Age: 67
End: 2022-03-14
Payer: COMMERCIAL

## 2022-03-14 VITALS
OXYGEN SATURATION: 98 % | HEART RATE: 96 BPM | SYSTOLIC BLOOD PRESSURE: 110 MMHG | BODY MASS INDEX: 37.46 KG/M2 | TEMPERATURE: 98.2 F | WEIGHT: 204.8 LBS | DIASTOLIC BLOOD PRESSURE: 70 MMHG

## 2022-03-14 DIAGNOSIS — R07.9 CHEST PAIN, UNSPECIFIED TYPE: ICD-10-CM

## 2022-03-14 DIAGNOSIS — K21.00 GASTROESOPHAGEAL REFLUX DISEASE WITH ESOPHAGITIS WITHOUT HEMORRHAGE: ICD-10-CM

## 2022-03-14 DIAGNOSIS — Z96.653 STATUS POST TOTAL BILATERAL KNEE REPLACEMENT: ICD-10-CM

## 2022-03-14 DIAGNOSIS — J10.1 INFLUENZA A: ICD-10-CM

## 2022-03-14 PROCEDURE — 99214 OFFICE O/P EST MOD 30 MIN: CPT | Performed by: INTERNAL MEDICINE

## 2022-03-14 RX ORDER — BENZONATATE 200 MG/1
200 CAPSULE ORAL 3 TIMES DAILY PRN
Qty: 30 CAPSULE | Refills: 0 | Status: SHIPPED | OUTPATIENT
Start: 2022-03-14 | End: 2022-07-21

## 2022-03-14 RX ORDER — MOMETASONE FUROATE 1 MG/ML
SOLUTION TOPICAL
COMMUNITY
Start: 2022-02-15 | End: 2023-09-26

## 2022-03-14 RX ORDER — AMOXICILLIN 500 MG/1
2000 CAPSULE ORAL ONCE
Qty: 4 CAPSULE | Refills: 3 | Status: SHIPPED | OUTPATIENT
Start: 2022-03-14 | End: 2023-03-28

## 2022-03-14 RX ORDER — FERROUS SULFATE 325(65) MG
325 TABLET ORAL DAILY
COMMUNITY
Start: 2022-02-16 | End: 2024-01-25

## 2022-03-14 NOTE — PROGRESS NOTES
"   Assessment & Plan     Chest pain, unspecified type  Recent ER evaluation for atypical left-sided chest pain sounding musculoskeletal.  No relationship to exertion and she has continued to exercise without recurrent symptoms.  No dyspnea.  Not related to meals or occurring at night.  Exam is normal.  No further evaluation needed at this time.    Influenza A  Diagnosed with influenza 1 week ago treated with Tamiflu.  Persistent cough and sinus congestion.  In addition to OTC cough syrup including Delsym, I will get her Tessalon 200 mg to use up to 3 times daily as needed.  She should take Sudafed as she plans to fly later this week to help avoid TM rupture.  - benzonatate (TESSALON) 200 MG capsule  Dispense: 30 capsule; Refill: 0    Gastroesophageal reflux disease with esophagitis without hemorrhage  Continues omeprazole with plans for EGD  - omeprazole (PRILOSEC) 20 MG DR capsule  Dispense: 90 capsule; Refill: 3    Status post total bilateral knee replacement  We will send a prescription for amoxicillin which she should take prior to dental visits  - amoxicillin (AMOXIL) 500 MG capsule  Dispense: 4 capsule; Refill: 3       BMI:   Estimated body mass index is 37.46 kg/m  as calculated from the following:    Height as of 3/6/22: 1.575 m (5' 2\").    Weight as of this encounter: 92.9 kg (204 lb 12.8 oz).                Return in about 4 months (around 7/14/2022) for Routine preventive.    Ho Quezada MD  [facility]          Subjective       HPI 66-year-old woman here to follow-up after ER evaluation with chest pain and also recently found to have influenza  See assessment and plan for details of visit    Current Outpatient Medications   Medication     amoxicillin (AMOXIL) 500 MG capsule     aspirin 81 MG EC tablet     atorvastatin (LIPITOR) 20 MG tablet     benzonatate (TESSALON) 200 MG capsule     citalopram (CELEXA) 20 MG tablet     ferrous sulfate (FEROSUL) 325 (65 Fe) MG " tablet     levothyroxine (SYNTHROID) 112 MCG tablet     lisinopril (ZESTRIL) 10 MG tablet     mometasone (ELOCON) 0.1 % external solution     omeprazole (PRILOSEC) 20 MG DR capsule     No current facility-administered medications for this visit.        Review of Systems  Cough persists but is improving.  No dyspnea.  No recurrent chest pain.  No fevers or chills      Objective    Vitals:    03/14/22 1057   BP: 110/70   Pulse: 96   Temp: 98.2  F (36.8  C)   TempSrc: Oral   SpO2: 98%   Weight: 92.9 kg (204 lb 12.8 oz)        Physical Exam  Well-appearing middle-aged woman  Lungs clear bilaterally no rales or wheezes  Heart regular rate and rhythm without murmur or gallop

## 2022-03-21 DIAGNOSIS — E03.9 HYPOTHYROIDISM, UNSPECIFIED TYPE: ICD-10-CM

## 2022-03-22 RX ORDER — LEVOTHYROXINE SODIUM 112 UG/1
TABLET ORAL
Qty: 90 TABLET | Refills: 2 | Status: SHIPPED | OUTPATIENT
Start: 2022-03-22 | End: 2022-12-18

## 2022-03-22 NOTE — TELEPHONE ENCOUNTER
"Last Written Prescription Date:  3/26/2021  Last Fill Quantity: 90,  # refills: 3   Last office visit provider:  3/14/22     Requested Prescriptions   Pending Prescriptions Disp Refills     levothyroxine (SYNTHROID/LEVOTHROID) 112 MCG tablet [Pharmacy Med Name: LEVOTHYROXINE 112 MCG TABLET] 90 tablet 3     Sig: TAKE 1 TABLET BY MOUTH EVERY DAY       Thyroid Protocol Passed - 3/21/2022 11:00 AM        Passed - Patient is 12 years or older        Passed - Recent (12 mo) or future (30 days) visit within the authorizing provider's specialty     Patient has had an office visit with the authorizing provider or a provider within the authorizing providers department within the previous 12 mos or has a future within next 30 days. See \"Patient Info\" tab in inbasket, or \"Choose Columns\" in Meds & Orders section of the refill encounter.              Passed - Medication is active on med list        Passed - Normal TSH on file in past 12 months     Recent Labs   Lab Test 02/08/22  1531   TSH 0.44              Passed - No active pregnancy on record     If patient is pregnant or has had a positive pregnancy test, please check TSH.          Passed - No positive pregnancy test in past 12 months     If patient is pregnant or has had a positive pregnancy test, please check TSH.               Lanny Rios RN 03/22/22 11:37 AM  "

## 2022-04-12 ENCOUNTER — ANCILLARY PROCEDURE (OUTPATIENT)
Dept: MAMMOGRAPHY | Facility: CLINIC | Age: 67
End: 2022-04-12
Attending: INTERNAL MEDICINE
Payer: COMMERCIAL

## 2022-04-12 DIAGNOSIS — Z12.31 VISIT FOR SCREENING MAMMOGRAM: ICD-10-CM

## 2022-04-12 PROCEDURE — 77067 SCR MAMMO BI INCL CAD: CPT | Mod: TC | Performed by: RADIOLOGY

## 2022-05-16 ENCOUNTER — TRANSFERRED RECORDS (OUTPATIENT)
Dept: HEALTH INFORMATION MANAGEMENT | Facility: CLINIC | Age: 67
End: 2022-05-16
Payer: COMMERCIAL

## 2022-05-22 ENCOUNTER — HEALTH MAINTENANCE LETTER (OUTPATIENT)
Age: 67
End: 2022-05-22

## 2022-06-04 DIAGNOSIS — E78.5 HYPERLIPIDEMIA LDL GOAL <100: ICD-10-CM

## 2022-06-05 NOTE — TELEPHONE ENCOUNTER
"Routing refill request to provider for review/approval because:  Labs not current:      Last Written Prescription Date:  9/2/21  Last Fill Quantity: 90,  # refills: 2   Last office visit provider:  3/14/22     Requested Prescriptions   Pending Prescriptions Disp Refills     atorvastatin (LIPITOR) 20 MG tablet [Pharmacy Med Name: ATORVASTATIN 20 MG TABLET] 90 tablet 2     Sig: TAKE 1 TABLET BY MOUTH EVERY DAY       Statins Protocol Failed - 6/4/2022  7:23 AM        Failed - LDL on file in past 12 months     Recent Labs   Lab Test 03/26/21  0937                Passed - No abnormal creatine kinase in past 12 months     No lab results found.             Passed - Recent (12 mo) or future (30 days) visit within the authorizing provider's specialty     Patient has had an office visit with the authorizing provider or a provider within the authorizing providers department within the previous 12 mos or has a future within next 30 days. See \"Patient Info\" tab in inbasket, or \"Choose Columns\" in Meds & Orders section of the refill encounter.              Passed - Medication is active on med list        Passed - Patient is age 18 or older        Passed - No active pregnancy on record        Passed - No positive pregnancy test in past 12 months             Leticia Batista, RN 06/05/22 3:39 PM  "

## 2022-06-06 RX ORDER — ATORVASTATIN CALCIUM 20 MG/1
TABLET, FILM COATED ORAL
Qty: 90 TABLET | Refills: 2 | Status: SHIPPED | OUTPATIENT
Start: 2022-06-06 | End: 2023-03-05

## 2022-06-08 DIAGNOSIS — F32.A DEPRESSION, UNSPECIFIED DEPRESSION TYPE: ICD-10-CM

## 2022-06-09 RX ORDER — CITALOPRAM HYDROBROMIDE 20 MG/1
TABLET ORAL
Qty: 90 TABLET | Refills: 3 | Status: SHIPPED | OUTPATIENT
Start: 2022-06-09 | End: 2023-03-28

## 2022-06-20 ENCOUNTER — TELEPHONE (OUTPATIENT)
Dept: INTERNAL MEDICINE | Facility: CLINIC | Age: 67
End: 2022-06-20
Payer: COMMERCIAL

## 2022-06-20 NOTE — TELEPHONE ENCOUNTER
Reason for Call:  Other FYI    Detailed comments: Carpenter wei Negrete is going to be requesting medical records for patient - she is trying to get long term insurance.    Phone Number Patient can be reached at: Home number on file [phone] (home)    Best Time: any    Can we leave a detailed message on this number? Not Applicable    Call taken on 6/20/2022 at 10:19 AM by Jose Cole

## 2022-06-22 ENCOUNTER — TELEPHONE (OUTPATIENT)
Dept: INTERNAL MEDICINE | Facility: CLINIC | Age: 67
End: 2022-06-22

## 2022-06-22 NOTE — TELEPHONE ENCOUNTER
Patient scheduled with Dr. Quezada tomorrow at 4:00 PM.  Bronwyn Sanchez CMA ............... 3:00 PM, 06/22/22

## 2022-06-22 NOTE — TELEPHONE ENCOUNTER
Reason for Call:  Other prescription request    Detailed comments: Pt called and states she believes she is having a diverticulitis flare up (She had diverticulitis  before many years ago). Pt is wondering if PCP can send a Rx for her to her pharmacy. Please call Pt with any questions/concerns.    Phone Number Patient can be reached at: Home number on file [phone] (home)    Best Time: any    Can we leave a detailed message on this number? YES    Call taken on 6/22/2022 at 2:52 PM by Cindy De La Fuente

## 2022-06-23 ENCOUNTER — OFFICE VISIT (OUTPATIENT)
Dept: INTERNAL MEDICINE | Facility: CLINIC | Age: 67
End: 2022-06-23
Payer: COMMERCIAL

## 2022-06-23 VITALS
WEIGHT: 206.5 LBS | HEIGHT: 62 IN | OXYGEN SATURATION: 97 % | HEART RATE: 79 BPM | BODY MASS INDEX: 38 KG/M2 | DIASTOLIC BLOOD PRESSURE: 70 MMHG | SYSTOLIC BLOOD PRESSURE: 112 MMHG | TEMPERATURE: 98.2 F

## 2022-06-23 DIAGNOSIS — K57.92 ACUTE DIVERTICULITIS: Primary | ICD-10-CM

## 2022-06-23 PROCEDURE — 99213 OFFICE O/P EST LOW 20 MIN: CPT | Performed by: INTERNAL MEDICINE

## 2022-06-23 RX ORDER — METRONIDAZOLE 500 MG/1
500 TABLET ORAL 3 TIMES DAILY
Qty: 30 TABLET | Refills: 0 | Status: SHIPPED | OUTPATIENT
Start: 2022-06-23 | End: 2022-07-03

## 2022-06-23 RX ORDER — CIPROFLOXACIN 500 MG/1
500 TABLET, FILM COATED ORAL 2 TIMES DAILY
Qty: 20 TABLET | Refills: 0 | Status: SHIPPED | OUTPATIENT
Start: 2022-06-23 | End: 2022-07-03

## 2022-06-23 NOTE — PROGRESS NOTES
"  Assessment & Plan     Acute diverticulitis  67-year-old woman who is developed acute left lower quadrant abdominal pain over the past 2 days.  She does have history of diverticulitis.  She did eat some nuts recently.  Exam with tenderness to palpation left lower quadrant.  Abdomen otherwise benign.  History and exam consistent with acute diverticulitis and will begin ciprofloxacin 500 mg twice daily and Flagyl 500 mg 3 times daily both for 10 days.  She has tolerated both these antibiotics in the past without difficulty.  - ciprofloxacin (CIPRO) 500 MG tablet; Take 1 tablet (500 mg) by mouth 2 times daily for 10 days  - metroNIDAZOLE (FLAGYL) 500 MG tablet; Take 1 tablet (500 mg) by mouth 3 times daily for 10 days             BMI:   Estimated body mass index is 37.77 kg/m  as calculated from the following:    Height as of this encounter: 1.575 m (5' 2\").    Weight as of this encounter: 93.7 kg (206 lb 8 oz).           Return in about 4 weeks (around 7/21/2022) for Routine preventive.    Ho Quezada MD  [facility]    Nhung Chau is a 67 year old, presenting for the following health issues: Here to discuss left lower quadrant abdominal pain.  See assessment plan for details  Abdominal Pain (Left lower, begins Tuesday night )      History of Present Illness       Reason for visit:  Diverticulitis    She eats 2-3 servings of fruits and vegetables daily.She consumes 1 sweetened beverage(s) daily.She exercises with enough effort to increase her heart rate 60 or more minutes per day.  She exercises with enough effort to increase her heart rate 4 days per week.   She is taking medications regularly.             Review of Systems   No fevers or chills      Objective    /70 (BP Location: Right arm, Patient Position: Sitting, Cuff Size: Adult Large)   Pulse 79   Temp 98.2  F (36.8  C) (Oral)   Ht 1.575 m (5' 2\")   Wt 93.7 kg (206 lb 8 oz)   SpO2 97%   BMI 37.77 " kg/m    Body mass index is 37.77 kg/m .  Physical Exam   Abdomen soft with normal bowel sounds.  Tenderness to palpation left lower quadrant.  No rebound tenderness.                .  ..

## 2022-07-17 ENCOUNTER — HEALTH MAINTENANCE LETTER (OUTPATIENT)
Age: 67
End: 2022-07-17

## 2022-07-17 ASSESSMENT — ENCOUNTER SYMPTOMS
JOINT SWELLING: 0
DIARRHEA: 0
SHORTNESS OF BREATH: 0
ARTHRALGIAS: 0
FREQUENCY: 0
SORE THROAT: 0
PARESTHESIAS: 0
HEARTBURN: 0
CONSTIPATION: 0
CHILLS: 0
HEMATURIA: 0
EYE PAIN: 0
DIZZINESS: 0
PALPITATIONS: 0
HEADACHES: 0
WEAKNESS: 0
ABDOMINAL PAIN: 0
MYALGIAS: 0
HEMATOCHEZIA: 0
BREAST MASS: 0
COUGH: 0
DYSURIA: 0
FEVER: 0
NAUSEA: 0
NERVOUS/ANXIOUS: 0

## 2022-07-17 ASSESSMENT — ACTIVITIES OF DAILY LIVING (ADL): CURRENT_FUNCTION: NO ASSISTANCE NEEDED

## 2022-07-21 ENCOUNTER — OFFICE VISIT (OUTPATIENT)
Dept: INTERNAL MEDICINE | Facility: CLINIC | Age: 67
End: 2022-07-21
Payer: COMMERCIAL

## 2022-07-21 VITALS
SYSTOLIC BLOOD PRESSURE: 114 MMHG | BODY MASS INDEX: 37.73 KG/M2 | HEART RATE: 89 BPM | DIASTOLIC BLOOD PRESSURE: 66 MMHG | HEIGHT: 62 IN | WEIGHT: 205 LBS | OXYGEN SATURATION: 98 %

## 2022-07-21 DIAGNOSIS — Z78.0 POSTMENOPAUSAL STATUS: ICD-10-CM

## 2022-07-21 DIAGNOSIS — E66.01 CLASS 2 SEVERE OBESITY WITH SERIOUS COMORBIDITY AND BODY MASS INDEX (BMI) OF 37.0 TO 37.9 IN ADULT, UNSPECIFIED OBESITY TYPE (H): ICD-10-CM

## 2022-07-21 DIAGNOSIS — K21.00 GASTROESOPHAGEAL REFLUX DISEASE WITH ESOPHAGITIS WITHOUT HEMORRHAGE: ICD-10-CM

## 2022-07-21 DIAGNOSIS — E66.812 CLASS 2 SEVERE OBESITY WITH SERIOUS COMORBIDITY AND BODY MASS INDEX (BMI) OF 37.0 TO 37.9 IN ADULT, UNSPECIFIED OBESITY TYPE (H): ICD-10-CM

## 2022-07-21 DIAGNOSIS — E03.9 HYPOTHYROIDISM, UNSPECIFIED TYPE: ICD-10-CM

## 2022-07-21 DIAGNOSIS — H40.10X0 OPEN-ANGLE GLAUCOMA OF BOTH EYES, UNSPECIFIED GLAUCOMA STAGE, UNSPECIFIED OPEN-ANGLE GLAUCOMA TYPE: ICD-10-CM

## 2022-07-21 DIAGNOSIS — E78.5 HYPERLIPIDEMIA, UNSPECIFIED HYPERLIPIDEMIA TYPE: ICD-10-CM

## 2022-07-21 DIAGNOSIS — G47.33 OSA (OBSTRUCTIVE SLEEP APNEA): ICD-10-CM

## 2022-07-21 DIAGNOSIS — I10 PRIMARY HYPERTENSION: ICD-10-CM

## 2022-07-21 DIAGNOSIS — Z96.653 STATUS POST TOTAL BILATERAL KNEE REPLACEMENT: ICD-10-CM

## 2022-07-21 DIAGNOSIS — Z86.0100 HISTORY OF COLON POLYPS: ICD-10-CM

## 2022-07-21 DIAGNOSIS — H91.93 BILATERAL HEARING LOSS, UNSPECIFIED HEARING LOSS TYPE: ICD-10-CM

## 2022-07-21 DIAGNOSIS — Z00.00 WELCOME TO MEDICARE PREVENTIVE VISIT: Primary | ICD-10-CM

## 2022-07-21 DIAGNOSIS — M15.0 PRIMARY OSTEOARTHRITIS INVOLVING MULTIPLE JOINTS: ICD-10-CM

## 2022-07-21 PROBLEM — R07.9 CHEST PAIN: Status: RESOLVED | Noted: 2022-03-14 | Resolved: 2022-07-21

## 2022-07-21 PROBLEM — J10.1 INFLUENZA A: Status: RESOLVED | Noted: 2022-03-14 | Resolved: 2022-07-21

## 2022-07-21 PROBLEM — M17.12 PRIMARY OSTEOARTHRITIS OF LEFT KNEE: Status: RESOLVED | Noted: 2020-01-06 | Resolved: 2022-07-21

## 2022-07-21 PROBLEM — G89.29 CHRONIC LEFT SHOULDER PAIN: Status: RESOLVED | Noted: 2021-05-03 | Resolved: 2022-07-21

## 2022-07-21 PROBLEM — M25.512 CHRONIC LEFT SHOULDER PAIN: Status: RESOLVED | Noted: 2021-05-03 | Resolved: 2022-07-21

## 2022-07-21 PROBLEM — M17.0 PRIMARY OSTEOARTHRITIS OF BOTH KNEES: Status: RESOLVED | Noted: 2021-03-26 | Resolved: 2022-07-21

## 2022-07-21 LAB
ALBUMIN SERPL BCG-MCNC: 4.4 G/DL (ref 3.5–5.2)
ALBUMIN UR-MCNC: NEGATIVE MG/DL
ALP SERPL-CCNC: 105 U/L (ref 35–104)
ALT SERPL W P-5'-P-CCNC: 20 U/L (ref 10–35)
ANION GAP SERPL CALCULATED.3IONS-SCNC: 11 MMOL/L (ref 7–15)
APPEARANCE UR: CLEAR
AST SERPL W P-5'-P-CCNC: 28 U/L (ref 10–35)
BACTERIA #/AREA URNS HPF: ABNORMAL /HPF
BILIRUB SERPL-MCNC: 0.9 MG/DL
BILIRUB UR QL STRIP: NEGATIVE
BUN SERPL-MCNC: 20.3 MG/DL (ref 8–23)
CALCIUM SERPL-MCNC: 9.9 MG/DL (ref 8.8–10.2)
CHLORIDE SERPL-SCNC: 105 MMOL/L (ref 98–107)
CHOLEST SERPL-MCNC: 171 MG/DL
COLOR UR AUTO: YELLOW
CREAT SERPL-MCNC: 1 MG/DL (ref 0.51–0.95)
DEPRECATED HCO3 PLAS-SCNC: 23 MMOL/L (ref 22–29)
ERYTHROCYTE [DISTWIDTH] IN BLOOD BY AUTOMATED COUNT: 11.7 % (ref 10–15)
GFR SERPL CREATININE-BSD FRML MDRD: 62 ML/MIN/1.73M2
GLUCOSE SERPL-MCNC: 105 MG/DL (ref 70–99)
GLUCOSE UR STRIP-MCNC: NEGATIVE MG/DL
HCT VFR BLD AUTO: 40.3 % (ref 35–47)
HDLC SERPL-MCNC: 47 MG/DL
HGB BLD-MCNC: 13.5 G/DL (ref 11.7–15.7)
HGB UR QL STRIP: ABNORMAL
KETONES UR STRIP-MCNC: NEGATIVE MG/DL
LDLC SERPL CALC-MCNC: 90 MG/DL
LEUKOCYTE ESTERASE UR QL STRIP: NEGATIVE
MCH RBC QN AUTO: 29.9 PG (ref 26.5–33)
MCHC RBC AUTO-ENTMCNC: 33.5 G/DL (ref 31.5–36.5)
MCV RBC AUTO: 89 FL (ref 78–100)
NITRATE UR QL: NEGATIVE
NONHDLC SERPL-MCNC: 124 MG/DL
PH UR STRIP: 5.5 [PH] (ref 5–8)
PLATELET # BLD AUTO: 237 10E3/UL (ref 150–450)
POTASSIUM SERPL-SCNC: 4.5 MMOL/L (ref 3.4–5.3)
PROT SERPL-MCNC: 7.1 G/DL (ref 6.4–8.3)
RBC # BLD AUTO: 4.52 10E6/UL (ref 3.8–5.2)
RBC #/AREA URNS AUTO: ABNORMAL /HPF
SODIUM SERPL-SCNC: 139 MMOL/L (ref 136–145)
SP GR UR STRIP: 1.02 (ref 1–1.03)
SQUAMOUS #/AREA URNS AUTO: ABNORMAL /LPF
TRIGL SERPL-MCNC: 171 MG/DL
TSH SERPL DL<=0.005 MIU/L-ACNC: 1.42 UIU/ML (ref 0.3–4.2)
UROBILINOGEN UR STRIP-ACNC: 0.2 E.U./DL
WBC # BLD AUTO: 4.5 10E3/UL (ref 4–11)
WBC #/AREA URNS AUTO: ABNORMAL /HPF

## 2022-07-21 PROCEDURE — 90677 PCV20 VACCINE IM: CPT | Performed by: INTERNAL MEDICINE

## 2022-07-21 PROCEDURE — 36415 COLL VENOUS BLD VENIPUNCTURE: CPT | Performed by: INTERNAL MEDICINE

## 2022-07-21 PROCEDURE — 85027 COMPLETE CBC AUTOMATED: CPT | Performed by: INTERNAL MEDICINE

## 2022-07-21 PROCEDURE — G0402 INITIAL PREVENTIVE EXAM: HCPCS | Performed by: INTERNAL MEDICINE

## 2022-07-21 PROCEDURE — 80061 LIPID PANEL: CPT | Performed by: INTERNAL MEDICINE

## 2022-07-21 PROCEDURE — 84443 ASSAY THYROID STIM HORMONE: CPT | Performed by: INTERNAL MEDICINE

## 2022-07-21 PROCEDURE — 80053 COMPREHEN METABOLIC PANEL: CPT | Performed by: INTERNAL MEDICINE

## 2022-07-21 PROCEDURE — G0009 ADMIN PNEUMOCOCCAL VACCINE: HCPCS | Performed by: INTERNAL MEDICINE

## 2022-07-21 PROCEDURE — 99214 OFFICE O/P EST MOD 30 MIN: CPT | Mod: 25 | Performed by: INTERNAL MEDICINE

## 2022-07-21 PROCEDURE — 81001 URINALYSIS AUTO W/SCOPE: CPT | Performed by: INTERNAL MEDICINE

## 2022-07-21 ASSESSMENT — ACTIVITIES OF DAILY LIVING (ADL): CURRENT_FUNCTION: NO ASSISTANCE NEEDED

## 2022-07-21 NOTE — PATIENT INSTRUCTIONS
Annual flu shot every fall    Recommending shingles vaccine, Shingrix.  This can be obtained at your pharmacy    You should get a tetanus booster in the next 6 months.  A Td vaccine can be obtained at the pharmacy as well    Continue annual mammograms    DEXA will be scheduled for osteoporosis screening    Colonoscopy should be repeated in 2027    Annual eye exam    Seeing a dermatologist for total-body skin exam every 1 to 2 years is recommended      Patient Education   Personalized Prevention Plan  You are due for the preventive services outlined below.  Your care team is available to assist you in scheduling these services.  If you have already completed any of these items, please share that information with your care team to update in your medical record.  Health Maintenance Due   Topic Date Due    Zoster (Shingles) Vaccine (2 of 3) 12/19/2017    COVID-19 Vaccine (4 - Booster for Pfizer series) 06/08/2022    Pneumococcal Vaccine (1 - PCV) 06/17/2022       Understanding USDA MyPlate  The USDA has guidelines to help you make healthy food choices. These are called MyPlate. MyPlate shows the food groups that make up healthy meals using the image of a place setting. Before you eat, think about the healthiest choices for what to put on your plate or in your cup or bowl. To learn more about building a healthy plate, visit www.choosemyplate.gov.    The food groups  Fruits. Any fruit or 100% fruit juice counts as part of the Fruit Group. Fruits may be fresh, canned, frozen, or dried, and may be whole, cut-up, or pureed. Make 1/2 of your plate fruits and vegetables.  Vegetables. Any vegetable or 100% vegetable juice counts as a member of the Vegetable Group. Vegetables may be fresh, frozen, canned, or dried. They can be served raw or cooked and may be whole, cut-up, or mashed. Make 1/2 of your plate fruits and vegetables.  Grains. All foods made from grains are part of the Grains Group. These include wheat, rice, oats,  cornmeal, and barley. Grains are often used to make foods such as bread, pasta, oatmeal, cereal, tortillas, and grits. Grains should be no more than 1/4 of your plate. At least half of your grains should be whole grains.  Protein. This group includes meat, poultry, seafood, beans and peas, eggs, processed soy products (such as tofu), nuts (including nut butters), and seeds. Make protein choices no more than 1/4 of your plate. Meat and poultry choices should be lean or low fat.  Dairy. The Dairy Group includes all fluid milk products and foods made from milk that contain calcium, such as yogurt and cheese. (Foods that have little calcium, such as cream, butter, and cream cheese, are not part of this group.) Most dairy choices should be low-fat or fat-free.  Oils. Oils aren't a food group, but they do contain essential nutrients. However it's important to watch your intake of oils. These are fats that are liquid at room temperature. They include canola, corn, olive, soybean, vegetable, and sunflower oil. Foods that are mainly oil include mayonnaise, certain salad dressings, and soft margarines. You likely already get your daily oil allowance from the foods you eat.  Things to limit  Eating healthy also means limiting these things in your diet:     Salt (sodium). Many processed foods have a lot of sodium. To keep sodium intake down, eat fresh vegetables, meats, poultry, and seafood when possible. Purchase low-sodium, reduced-sodium, or no-salt-added food products at the store. And don't add salt to your meals at home. Instead, season them with herbs and spices such as dill, oregano, cumin, and paprika. Or try adding flavor with lemon or lime zest and juice.  Saturated fat. Saturated fats are most often found in animal products such as beef, pork, and chicken. They are often solid at room temperature, such as butter. To reduce your saturated fat intake, choose leaner cuts of meat and poultry. And try healthier cooking  methods such as grilling, broiling, roasting, or baking. For a simple lower-fat swap, use plain nonfat yogurt instead of mayonnaise when making potato salad or macaroni salad.  Added sugars. These are sugars added to foods. They are in foods such as ice cream, candy, soda, fruit drinks, sports drinks, energy drinks, cookies, pastries, jams, and syrups. Cut down on added sugars by sharing sweet treats with a family member or friend. You can also choose fruit for dessert, and drink water or other unsweetened beverages.     [employer] last reviewed this educational content on 6/1/2020 2000-2021 [employer]. All rights reserved. This information is not intended as a substitute for professional medical care. Always follow your healthcare professional's instructions.             Preventing Falls at Home  A person can fall for many reasons. Older adults may fall because reaction time slows down as we age. Your muscles and joints may get stiff, weak, or less flexible because of illness, medicines, or a physical condition.   Other health problems that make falls more likely include:   Arthritis  Dizziness or lightheadedness when you stand up (orthostatic hypotension)  History of a stroke  Dizziness  Anemia  Certain medicines taken for mental illness or to control blood pressure.  Problems with balance or gait  Bladder or urinary problems  History of falling  Changes in vision (vision impairment)  Changes in thinking skills and memory (cognitive impairment)  Injuries from a fall can include serious injuries such as broken bones, dislocated joints, internal bleeding and cuts. Injuries like these can limit your independence.   Prevention tips  To help prevent falls and fall-related injuries, follow the tips below.    Floors  To make floors safer:   Put nonskid pads under area rugs.  Remove small rugs.  Replace worn floor coverings.  Tack carpets firmly to each step on carpeted stairs. Put nonskid strips on the  edges of uncarpeted stairs.  Keep floors and stairs free of clutter and cords.  Arrange furniture so there are clear pathways.  Clean up any spills right away.  Bathrooms    To make bathrooms safer:   Install grab bars in the tub or shower.  Apply nonskid strips or put a nonskid rubber mat in the tub or shower.  Sit on a bath chair to bathe.  Use bathmats with nonskid backing.  Lighting  To improve visibility in your home:    Keep a flashlight in each room. Or put a lamp next to the bed within easy reach.  Put nightlights in the bedrooms, hallways, kitchen, and bathrooms.  Make sure all stairways have good lighting.  Take your time when going up and down stairs.  Put handrails on both sides of stairs and in walkways for more support. To prevent injury to your wrist or arm, don t use handrails to pull yourself up.  Install grab bars to pull yourself up.  Move or rearrange items that you use often. This will make them easier to find or reach.  Look at your home to find any safety hazards. Especially look at doorways, walkways, and the driveway. Remove or repair any safety problems that you find.  Other changes to make  Look around to find any safety hazards. Look closely at doorways, walkways, and the driveway. Remove or repair any safety problems that you find.  Wear shoes that fit well.  Take your time when going up and down stairs.  Put handrails on both sides of stairs and in walkways for more support. To prevent injury to your wrist or arm, don t use handrails to pull yourself up.  Install grab bars wherever needed to pull yourself up.  Arrange items that you use often. This will make them easier to find or reach.    [employer] last reviewed this educational content on 3/1/2020    4312-1513 [employer]. All rights reserved. This information is not intended as a substitute for professional medical care. Always follow your healthcare professional's instructions.

## 2022-07-21 NOTE — PROGRESS NOTES
"SUBJECTIVE:   Kandi Kim is a 67 year old female who presents for Preventive Visit.    [id number] woman here for welcome to Medicare exam and to follow-up multiple chronic medical problems including hypertension, osteoarthritis, hypothyroidism, GERD, hyperlipidemia and sleep apnea.  See assessment plan for details.    Patient has been advised of split billing requirements and indicates understanding: Yes  Are you in the first 12 months of your Medicare coverage?  Yes,  Visual Acuity:  Right Eye: 20 25   Left Eye: 20 32  Both Eyes: 20 20    Healthy Habits:     In general, how would you rate your overall health?  Good    Frequency of exercise:  4-5 days/week    Duration of exercise:  45-60 minutes    Do you usually eat at least 4 servings of fruit and vegetables a day, include whole grains    & fiber and avoid regularly eating high fat or \"junk\" foods?  No    Taking medications regularly:  Yes    Medication side effects:  Not applicable    Ability to successfully perform activities of daily living:  No assistance needed    Home Safety:  No safety concerns identified    Hearing Impairment:  No hearing concerns    In the past 6 months, have you been bothered by leaking of urine?  No    In general, how would you rate your overall mental or emotional health?  Good      PHQ-2 Total Score: 0    Additional concerns today:  No    Do you feel safe in your environment? Yes    Have you ever done Advance Care Planning? (For example, a Health Directive, POLST, or a discussion with a medical provider or your loved ones about your wishes): Yes, advance care planning is on file.       Fall risk  Fallen 2 or more times in the past year?: No  Any fall with injury in the past year?: No    Cognitive Screening   1) Repeat 3 items (Leader, Season, Table)    2) Clock draw: NORMAL  3) 3 item recall: Recalls 3 objects  Results: 3 items recalled: COGNITIVE IMPAIRMENT LESS LIKELY    Mini-CogTM Copyright MARISSA Bell. Licensed by the " "author for use in [facility]; reprinted with permission (joannaaristides@[facility]). All rights reserved.      Do you have sleep apnea, excessive snoring or daytime drowsiness?: no    Reviewed and updated as needed this visit by clinical staff   Tobacco  Allergies  Meds  Problems  Med Hx  Surg Hx  Fam Hx            Reviewed and updated as needed this visit by Provider   Tobacco  Allergies  Meds  Problems  Med Hx  Surg Hx  Fam Hx           Social History     Tobacco Use     Smoking status: Never Smoker     Smokeless tobacco: Never Used   Substance Use Topics     Alcohol use: Yes     Comment: Alcoholic Drinks/day: infrequent     If you drink alcohol do you typically have >3 drinks per day or >7 drinks per week? No    Alcohol Use 7/21/2022   Prescreen: >3 drinks/day or >7 drinks/week? -   Prescreen: >3 drinks/day or >7 drinks/week? No               Current providers sharing in care for this patient include:   Patient Care Team:  Ho Quezada MD as PCP - General  Ho Quezada MD as Assigned PCP    The following health maintenance items are reviewed in Epic and correct as of today:  Health Maintenance Due   Topic Date Due     ZOSTER IMMUNIZATION (2 of 3) 12/19/2017     COVID-19 Vaccine (4 - Booster for Pfizer series) 06/08/2022     Lab work is in process      Breast CA Risk Assessment (FHS-7) 7/17/2022   Do you have a family history of breast, colon, or ovarian cancer? No / Unknown               Review of Systems  12 point review of systems is negative other than what is discussed in assessment and plan    OBJECTIVE:   /66 (BP Location: Right arm, Patient Position: Sitting, Cuff Size: Adult Regular)   Pulse 89   Ht 1.575 m (5' 2\")   Wt 93 kg (205 lb)   SpO2 98%   BMI 37.49 kg/m   Estimated body mass index is 37.49 kg/m  as calculated from the following:    Height as of this encounter: 1.575 m (5' 2\").    Weight as of this encounter: 93 kg (205 lb).  Physical Exam  EYES: Eyelids, " conjunctiva, and sclera were normal. Pupils were normal. Cornea, iris, and lens were normal bilaterally.  HEAD, EARS, NOSE, MOUTH, AND THROAT: Head and face were normal. TMs and external auditory canals are normal  NECK: Neck appearance was normal. There were no neck masses and the thyroid was not enlarged and no nodules are felt.  No lymphadenopathy.  RESPIRATORY: Breathing pattern was normal and the chest moved symmetrically.   Lung sounds were normal and there were no rales or wheezes.  CARDIOVASCULAR: Heart rate and rhythm were normal.  S1 and S2 were normal and there were no extra sounds or murmurs. Peripheral pulses in arms and legs were normal.  There was no peripheral edema.  No carotid bruits.  GASTROINTESTINAL:  Bowel sounds were present.   Palpation detected no tenderness, mass, or enlarged organs.   MUSCULOSKELETAL: Skeletal configuration was normal and muscle mass was normal for age. Joint appearance was overall normal.  Bilateral knee replacements  LYMPHATIC: There were no enlarged nodes.  SKIN/HAIR/NAILS: Skin color was normal.  There were no concerning skin lesions.  NEUROLOGIC: The patient was alert and oriented to person, place, time, and circumstance. Speech was normal. Cranial nerves were normal. Motor strength was normal for age. The patient was normally coordinated.  Sensation intact.  PSYCHIATRIC:  Mood and affect were normal and the patient had normal recent and remote memory. The patient's judgment and insight were normal.        ASSESSMENT / PLAN:   1. Welcome to Medicare preventive visit  Immunizations are reviewed and providing Prevnar 20.  Recommending Shingrix and getting a tetanus booster.  She should also get an annual flu shot every fall.  She declines having her second COVID booster. Living will discussed.  Information provided.  Non-smoker.  Uses alcohol in moderation.  Regular exercise and good diet habits to maintain a healthy weight discussed.  Up to date with colonoscopies and  this should be repeated in 2027.  Recommending annual mammogram for breast cancer screening.   Last DEXA was 2016 and I am recommending getting this completed this year now that she is 67. Dementia and depression screening completed.  Recommending annual eye exam.  Recommending seeing a dentist every 6 months.  Skin exam performed and recommending regular use of sunblock.  She is scheduled to have a total-body skin exam with a dermatologist.  Hepatitis C antibody for screening was normal.  Will screen for diabetes with fasting glucose.    2. Primary hypertension  Excellent blood pressure control taking lisinopril 10 mg daily  - CBC with platelets; Future  - Comprehensive metabolic panel (BMP + Alb, Alk Phos, ALT, AST, Total. Bili, TP); Future  - UA Macro with Reflex to Micro and Culture - lab collect; Future  - CBC with platelets  - Comprehensive metabolic panel (BMP + Alb, Alk Phos, ALT, AST, Total. Bili, TP)  - UA Macro with Reflex to Micro and Culture - lab collect    3. Primary osteoarthritis involving multiple joints  Osteoarthritis involving multiple joints.  She has had both knees replaced.  Chronic pain in left ankle and right shoulder followed by orthopedics and needing cortisone injections    4. Class 2 severe obesity with serious comorbidity and body mass index (BMI) of 37.0 to 37.9 in adult, unspecified obesity type (H)  She is try to get regular exercise and indicates that her diet is generally good.  Encouraging continued calorie restriction and ongoing exercise with a goal of bringing her weight down.  BMI is currently 37.4    5. Hypothyroidism, unspecified type  She continues on levothyroxine.  Recheck TSH and adjust dose if necessary  - TSH; Future  - TSH    6. Status post total bilateral knee replacement      7. Gastroesophageal reflux disease with esophagitis without hemorrhage  Reflux symptoms well controlled with omeprazole.  No dysphagia    8. Hyperlipidemia, unspecified hyperlipidemia  "type  Recheck lipid profile taking atorvastatin  - Lipid Profile (Chol, Trig, HDL, LDL calc); Future  - Lipid Profile (Chol, Trig, HDL, LDL calc)    9. HONEY (obstructive sleep apnea)  She wears a mouth appliance    10. Bilateral hearing loss, unspecified hearing loss type  She has hearing aids    11. Open-angle glaucoma of both eyes, unspecified glaucoma stage, unspecified open-angle glaucoma type  Recent diagnosis getting started on eyedrops    12. History of colon polyps  Colonoscopy with multiple polyps in 2018.  Colonoscopy earlier this year with 1 adenomatous polyp and several hyperplastic polyps.  I would recommend returning in 5 years    13. Postmenopausal status    - DX Hip/Pelvis/Spine; Future    Patient has been advised of split billing requirements and indicates understanding: Yes    COUNSELING:  Reviewed preventive health counseling, as reflected in patient instructions       Regular exercise       Healthy diet/nutrition       Vision screening       Dental care       Fall risk prevention       Osteoporosis prevention/bone health       Colon cancer screening       Advanced Planning     Estimated body mass index is 37.49 kg/m  as calculated from the following:    Height as of this encounter: 1.575 m (5' 2\").    Weight as of this encounter: 93 kg (205 lb).        She reports that she has never smoked. She has never used smokeless tobacco.      Appropriate preventive services were discussed with this patient, including applicable screening as appropriate for cardiovascular disease, diabetes, osteopenia/osteoporosis, and glaucoma.  As appropriate for age/gender, discussed screening for colorectal cancer, prostate cancer, breast cancer, and cervical cancer. Checklist reviewing preventive services available has been given to the patient.    Reviewed patients plan of care and provided an AVS. The Basic Care Plan (routine screening as documented in Health Maintenance) for Kandi meets the Care Plan requirement. This " Care Plan has been established and reviewed with the Patient.    Counseling Resources:  ATP IV Guidelines  Pooled Cohorts Equation Calculator  Breast Cancer Risk Calculator  Breast Cancer: Medication to Reduce Risk  FRAX Risk Assessment  ICSI Preventive Guidelines  Dietary Guidelines for Americans, 2010  USDA's MyPlate  ASA Prophylaxis  Lung CA Screening    Ho Quezada MD  [facility]    Identified Health Risks:      The patient was counseled and encouraged to consider modifying their diet and eating habits. She was provided with information on recommended healthy diet options.  She is at risk for falling and has been provided with information to reduce the risk of falling at home.

## 2022-07-21 NOTE — PROGRESS NOTES
The patient was counseled and encouraged to consider modifying their diet and eating habits. She was provided with information on recommended healthy diet options.  She is at risk for falling and has been provided with information to reduce the risk of falling at home.

## 2022-08-05 ENCOUNTER — ANCILLARY PROCEDURE (OUTPATIENT)
Dept: BONE DENSITY | Facility: CLINIC | Age: 67
End: 2022-08-05
Attending: INTERNAL MEDICINE
Payer: COMMERCIAL

## 2022-08-05 DIAGNOSIS — Z78.0 POSTMENOPAUSAL STATUS: ICD-10-CM

## 2022-08-05 PROCEDURE — 77080 DXA BONE DENSITY AXIAL: CPT | Mod: TC | Performed by: RADIOLOGY

## 2022-08-15 ENCOUNTER — TRANSFERRED RECORDS (OUTPATIENT)
Dept: HEALTH INFORMATION MANAGEMENT | Facility: CLINIC | Age: 67
End: 2022-08-15

## 2022-09-24 ENCOUNTER — HEALTH MAINTENANCE LETTER (OUTPATIENT)
Age: 67
End: 2022-09-24

## 2022-11-14 ENCOUNTER — TRANSFERRED RECORDS (OUTPATIENT)
Dept: HEALTH INFORMATION MANAGEMENT | Facility: CLINIC | Age: 67
End: 2022-11-14

## 2022-12-17 DIAGNOSIS — E03.9 HYPOTHYROIDISM, UNSPECIFIED TYPE: ICD-10-CM

## 2022-12-18 RX ORDER — LEVOTHYROXINE SODIUM 112 UG/1
TABLET ORAL
Qty: 90 TABLET | Refills: 2 | Status: SHIPPED | OUTPATIENT
Start: 2022-12-18 | End: 2023-07-14

## 2022-12-18 NOTE — TELEPHONE ENCOUNTER
"Last Written Prescription Date:  3/22/22  Last Fill Quantity: 90,  # refills: 2   Last office visit provider:  7/21/22     Requested Prescriptions   Pending Prescriptions Disp Refills     levothyroxine (SYNTHROID/LEVOTHROID) 112 MCG tablet [Pharmacy Med Name: LEVOTHYROXINE 112 MCG TABLET] 90 tablet 2     Sig: TAKE 1 TABLET BY MOUTH EVERY DAY       Thyroid Protocol Passed - 12/17/2022  7:04 AM        Passed - Patient is 12 years or older        Passed - Recent (12 mo) or future (30 days) visit within the authorizing provider's specialty     Patient has had an office visit with the authorizing provider or a provider within the authorizing providers department within the previous 12 mos or has a future within next 30 days. See \"Patient Info\" tab in inbasket, or \"Choose Columns\" in Meds & Orders section of the refill encounter.              Passed - Medication is active on med list        Passed - Normal TSH on file in past 12 months     Recent Labs   Lab Test 07/21/22  0958   TSH 1.42              Passed - No active pregnancy on record     If patient is pregnant or has had a positive pregnancy test, please check TSH.          Passed - No positive pregnancy test in past 12 months     If patient is pregnant or has had a positive pregnancy test, please check TSH.               Cindy Waldrop RN 12/18/22 1:24 PM  "

## 2023-03-04 DIAGNOSIS — E78.5 HYPERLIPIDEMIA LDL GOAL <100: ICD-10-CM

## 2023-03-04 DIAGNOSIS — K21.00 GASTROESOPHAGEAL REFLUX DISEASE WITH ESOPHAGITIS WITHOUT HEMORRHAGE: ICD-10-CM

## 2023-03-04 DIAGNOSIS — I10 ESSENTIAL HYPERTENSION: ICD-10-CM

## 2023-03-05 RX ORDER — ATORVASTATIN CALCIUM 20 MG/1
TABLET, FILM COATED ORAL
Qty: 90 TABLET | Refills: 1 | Status: SHIPPED | OUTPATIENT
Start: 2023-03-05 | End: 2023-07-14

## 2023-03-05 NOTE — TELEPHONE ENCOUNTER
"Routing refill request to provider for review/approval because:  Labs out of range:  cr    Last Written Prescription Date:  3/8/22  Last Fill Quantity: 90,  # refills: 3   Last office visit provider:  7/21/22     Requested Prescriptions   Pending Prescriptions Disp Refills     lisinopril (ZESTRIL) 10 MG tablet [Pharmacy Med Name: LISINOPRIL 10 MG TABLET] 90 tablet 3     Sig: TAKE 1 TABLET BY MOUTH EVERY DAY       ACE Inhibitors (Including Combos) Protocol Failed - 3/4/2023  8:49 AM        Failed - Normal serum creatinine on file in past 12 months     Recent Labs   Lab Test 07/21/22  0958   CR 1.00*       Ok to refill medication if creatinine is low          Passed - Blood pressure under 140/90 in past 12 months     BP Readings from Last 3 Encounters:   07/21/22 114/66   06/23/22 112/70   03/14/22 110/70                 Passed - Recent (12 mo) or future (30 days) visit within the authorizing provider's specialty     Patient has had an office visit with the authorizing provider or a provider within the authorizing providers department within the previous 12 mos or has a future within next 30 days. See \"Patient Info\" tab in inbasket, or \"Choose Columns\" in Meds & Orders section of the refill encounter.              Passed - Medication is active on med list        Passed - Patient is age 18 or older        Passed - No active pregnancy on record        Passed - Normal serum potassium on file in past 12 months     Recent Labs   Lab Test 07/21/22  0958   POTASSIUM 4.5             Passed - No positive pregnancy test within past 12 months           omeprazole (PRILOSEC) 20 MG DR capsule [Pharmacy Med Name: OMEPRAZOLE DR 20 MG CAPSULE] 90 capsule 3     Sig: TAKE 1 CAPSULE BY MOUTH EVERY DAY       PPI Protocol Passed - 3/4/2023  8:49 AM        Passed - Not on Clopidogrel (unless Pantoprazole ordered)        Passed - No diagnosis of osteoporosis on record        Passed - Recent (12 mo) or future (30 days) visit within the " "authorizing provider's specialty     Patient has had an office visit with the authorizing provider or a provider within the authorizing providers department within the previous 12 mos or has a future within next 30 days. See \"Patient Info\" tab in inbasket, or \"Choose Columns\" in Meds & Orders section of the refill encounter.              Passed - Medication is active on med list        Passed - Patient is age 18 or older        Passed - No active pregnacy on record        Passed - No positive pregnancy test in past 12 months           atorvastatin (LIPITOR) 20 MG tablet [Pharmacy Med Name: ATORVASTATIN 20 MG TABLET] 90 tablet 2     Sig: TAKE 1 TABLET BY MOUTH EVERY DAY       Statins Protocol Passed - 3/4/2023  8:49 AM        Passed - LDL on file in past 12 months     Recent Labs   Lab Test 07/21/22  0958   LDL 90             Passed - No abnormal creatine kinase in past 12 months     No lab results found.             Passed - Recent (12 mo) or future (30 days) visit within the authorizing provider's specialty     Patient has had an office visit with the authorizing provider or a provider within the authorizing providers department within the previous 12 mos or has a future within next 30 days. See \"Patient Info\" tab in inbasket, or \"Choose Columns\" in Meds & Orders section of the refill encounter.              Passed - Medication is active on med list        Passed - Patient is age 18 or older        Passed - No active pregnancy on record        Passed - No positive pregnancy test in past 12 months             Leticia Batista RN 03/05/23 12:42 PM  "

## 2023-03-06 RX ORDER — LISINOPRIL 10 MG/1
TABLET ORAL
Qty: 90 TABLET | Refills: 3 | Status: SHIPPED | OUTPATIENT
Start: 2023-03-06 | End: 2024-02-29

## 2023-03-13 ENCOUNTER — TRANSFERRED RECORDS (OUTPATIENT)
Dept: HEALTH INFORMATION MANAGEMENT | Facility: CLINIC | Age: 68
End: 2023-03-13

## 2023-03-17 DIAGNOSIS — F32.A DEPRESSION, UNSPECIFIED DEPRESSION TYPE: ICD-10-CM

## 2023-03-17 RX ORDER — CITALOPRAM HYDROBROMIDE 20 MG/1
TABLET ORAL
Qty: 90 TABLET | Refills: 3 | OUTPATIENT
Start: 2023-03-17

## 2023-03-27 DIAGNOSIS — Z96.653 STATUS POST TOTAL BILATERAL KNEE REPLACEMENT: ICD-10-CM

## 2023-03-27 DIAGNOSIS — F32.A DEPRESSION, UNSPECIFIED DEPRESSION TYPE: ICD-10-CM

## 2023-03-28 RX ORDER — AMOXICILLIN 500 MG/1
2000 CAPSULE ORAL ONCE
Qty: 4 CAPSULE | Refills: 3 | Status: SHIPPED | OUTPATIENT
Start: 2023-03-28 | End: 2023-03-28

## 2023-03-28 RX ORDER — CITALOPRAM HYDROBROMIDE 20 MG/1
TABLET ORAL
Qty: 90 TABLET | Refills: 3 | Status: SHIPPED | OUTPATIENT
Start: 2023-03-28 | End: 2024-04-11

## 2023-03-28 NOTE — TELEPHONE ENCOUNTER
"Routing refill request to provider for review/approval because:  PCP please review for refill    Last Written Prescription Date:  3/14/2022  Last Fill Quantity: 4,  # refills: 3   Last office visit provider:  7/21/2022     Requested Prescriptions   Pending Prescriptions Disp Refills     amoxicillin (AMOXIL) 500 MG capsule [Pharmacy Med Name: AMOXICILLIN 500 MG CAPSULE] 4 capsule 3     Sig: TAKE 4 CAPSULES (2,000 MG) BY MOUTH ONCE FOR 1 DOSE PRIOR TO DENTIST VISITS       Oral Acne/Rosacea Medications Protocol Failed - 3/27/2023  1:45 PM        Failed - Confirmation of diagnosis is required     Please confirm diagnosis is acne or rosacea.     If NOT acne or rosacea; refer request to provider for further evaluation.    If diagnosis IS acne or rosacea, OK to refill BASED ON PREVIOUS REFILL CLINICAL NOTE RECOMMENDATION.          Passed - Patient is 12 years of age or older        Passed - Recent (12 mo) or future (30 days) visit within the authorizing provider's specialty     Patient has had an office visit with the authorizing provider or a provider within the authorizing providers department within the previous 12 mos or has a future within next 30 days. See \"Patient Info\" tab in inbasket, or \"Choose Columns\" in Meds & Orders section of the refill encounter.              Passed - Medication is active on med list        Passed - No active pregnancy on record        Passed - No positive prenancy test is past 12 months        Routing refill request to provider for review/approval because:  Patient need to be seen because it has been more than 6 months since last office visit.  PHQ-9    Last Written Prescription Date:  6/9/2022  Last Fill Quantity: 90,  # refills: 3        citalopram (CELEXA) 20 MG tablet [Pharmacy Med Name: CITALOPRAM HBR 20 MG TABLET] 90 tablet 3     Sig: TAKE 1 TABLET BY MOUTH EVERY DAY       SSRIs Protocol Failed - 3/27/2023  1:45 PM        Failed - PHQ-9 score less than 5 in past 6 months     Please " "review last PHQ-9 score.           Failed - Recent (6 mo) or future (30 days) visit within the authorizing provider's specialty     Patient had office visit in the last 6 months or has a visit in the next 30 days with authorizing provider or within the authorizing provider's specialty.  See \"Patient Info\" tab in inbasket, or \"Choose Columns\" in Meds & Orders section of the refill encounter.            Passed - Medication is active on med list        Passed - Patient is age 18 or older        Passed - No active pregnancy on record        Passed - No positive pregnancy test in last 12 months             Meghana Thompson RN 03/28/23 3:48 PM  "

## 2023-05-02 ENCOUNTER — ANCILLARY PROCEDURE (OUTPATIENT)
Dept: MAMMOGRAPHY | Facility: CLINIC | Age: 68
End: 2023-05-02
Attending: INTERNAL MEDICINE
Payer: COMMERCIAL

## 2023-05-02 DIAGNOSIS — Z12.31 VISIT FOR SCREENING MAMMOGRAM: ICD-10-CM

## 2023-05-02 PROCEDURE — 77067 SCR MAMMO BI INCL CAD: CPT | Mod: TC | Performed by: RADIOLOGY

## 2023-05-20 ENCOUNTER — OFFICE VISIT (OUTPATIENT)
Dept: FAMILY MEDICINE | Facility: CLINIC | Age: 68
End: 2023-05-20
Payer: COMMERCIAL

## 2023-05-20 VITALS
WEIGHT: 204.3 LBS | TEMPERATURE: 97.6 F | DIASTOLIC BLOOD PRESSURE: 79 MMHG | RESPIRATION RATE: 16 BRPM | BODY MASS INDEX: 37.37 KG/M2 | OXYGEN SATURATION: 97 % | HEART RATE: 80 BPM | SYSTOLIC BLOOD PRESSURE: 121 MMHG

## 2023-05-20 DIAGNOSIS — J06.9 VIRAL URI WITH COUGH: Primary | ICD-10-CM

## 2023-05-20 PROCEDURE — 99213 OFFICE O/P EST LOW 20 MIN: CPT | Performed by: NURSE PRACTITIONER

## 2023-05-20 ASSESSMENT — ENCOUNTER SYMPTOMS: HEADACHES: 1

## 2023-05-20 NOTE — PROGRESS NOTES
Assessment & Plan     Viral URI with cough         Focused exam and history done due to COVID-19 pandemic in a walk-in setting.      History, exam, and vital signs consistent with a viral URI with significant congestion.  No fevers.  No indication for treatment with antibiotics at this time.  Should wait 5 days and do E-visit if still persistent.      Recommended the following    Afrin for 3 days as needed     Schedule Mucinex    Can try a sinus rinse as needed -patient has a [place] pot.    No red flags on exam.    Recheck if shortness of breath or new fevers develop or congestion suddenly worsens despite above actions.  Rest.     OTCs recommended: None [   ].  Dextromethorphan  [  ], guaifenesin [  ], pseudoephedrine [   ], Afrin for no greater than 3 days [  ], Tylenol or ibuprofen [ x ].                Return in about 6 days (around 5/26/2023) for If no better.    Leann Rahman, [facility]    Nhung hCau is a 67 year old female who presents to clinic today for the following health issues:  Chief Complaint   Patient presents with     URI     Cough, ear ache, right side of neck pain, congestion, COVID home test negative      HPI    Cough x 7-8 days preceded by ST.  Congestion in face for about 5-6 days.  Took Sudafed yesterday.  Was using mucinex which helped a bit.      Still coughing up mucus.      Rt ear and throat pain.      Neg covid test at home and had COVID in March.  No fevers.        Review of Systems   Neurological: Positive for headaches.           Objective    /79   Pulse 80   Temp 97.6  F (36.4  C) (Oral)   Resp 16   Wt 92.7 kg (204 lb 4.8 oz)   SpO2 97%   BMI 37.37 kg/m    Physical Exam  Constitutional:       Appearance: Normal appearance.   HENT:      Right Ear: Tympanic membrane normal.      Left Ear: Tympanic membrane normal.      Nose: Congestion and rhinorrhea present.      Right Sinus: Maxillary sinus tenderness present.      Left  Sinus: Maxillary sinus tenderness present.   Eyes:      Conjunctiva/sclera: Conjunctivae normal.   Pulmonary:      Effort: Pulmonary effort is normal.      Breath sounds: Normal breath sounds. No wheezing.   Lymphadenopathy:      Cervical: Cervical adenopathy present.   Neurological:      Mental Status: She is alert.   Psychiatric:         Mood and Affect: Mood normal.

## 2023-05-20 NOTE — PATIENT INSTRUCTIONS
Afrin for 3 days as needed     Schedule Mucinex    Can try a sinus rinse as needed     Evisit for not improving congestion after 5+ additional days.

## 2023-07-07 ENCOUNTER — OFFICE VISIT (OUTPATIENT)
Dept: INTERNAL MEDICINE | Facility: CLINIC | Age: 68
End: 2023-07-07
Payer: COMMERCIAL

## 2023-07-07 VITALS
HEART RATE: 80 BPM | DIASTOLIC BLOOD PRESSURE: 82 MMHG | TEMPERATURE: 98.3 F | HEIGHT: 62 IN | OXYGEN SATURATION: 99 % | SYSTOLIC BLOOD PRESSURE: 120 MMHG | WEIGHT: 205 LBS | RESPIRATION RATE: 16 BRPM | BODY MASS INDEX: 37.73 KG/M2

## 2023-07-07 DIAGNOSIS — I10 PRIMARY HYPERTENSION: ICD-10-CM

## 2023-07-07 DIAGNOSIS — M75.101 TEAR OF RIGHT ROTATOR CUFF, UNSPECIFIED TEAR EXTENT, UNSPECIFIED WHETHER TRAUMATIC: ICD-10-CM

## 2023-07-07 DIAGNOSIS — E03.9 HYPOTHYROIDISM, UNSPECIFIED TYPE: ICD-10-CM

## 2023-07-07 DIAGNOSIS — G47.33 OSA (OBSTRUCTIVE SLEEP APNEA): ICD-10-CM

## 2023-07-07 DIAGNOSIS — K21.00 GASTROESOPHAGEAL REFLUX DISEASE WITH ESOPHAGITIS WITHOUT HEMORRHAGE: ICD-10-CM

## 2023-07-07 DIAGNOSIS — Z01.818 PREOPERATIVE EXAMINATION: Primary | ICD-10-CM

## 2023-07-07 PROBLEM — M75.41 IMPINGEMENT SYNDROME OF RIGHT SHOULDER: Status: ACTIVE | Noted: 2023-06-22

## 2023-07-07 LAB
ANION GAP SERPL CALCULATED.3IONS-SCNC: 11 MMOL/L (ref 7–15)
ATRIAL RATE - MUSE: 78 BPM
BUN SERPL-MCNC: 20.5 MG/DL (ref 8–23)
CALCIUM SERPL-MCNC: 9.7 MG/DL (ref 8.8–10.2)
CHLORIDE SERPL-SCNC: 107 MMOL/L (ref 98–107)
CREAT SERPL-MCNC: 0.96 MG/DL (ref 0.51–0.95)
DEPRECATED HCO3 PLAS-SCNC: 23 MMOL/L (ref 22–29)
DIASTOLIC BLOOD PRESSURE - MUSE: NORMAL MMHG
ERYTHROCYTE [DISTWIDTH] IN BLOOD BY AUTOMATED COUNT: 11.7 % (ref 10–15)
GFR SERPL CREATININE-BSD FRML MDRD: 65 ML/MIN/1.73M2
GLUCOSE SERPL-MCNC: 114 MG/DL (ref 70–99)
HCT VFR BLD AUTO: 40.5 % (ref 35–47)
HGB BLD-MCNC: 13.8 G/DL (ref 11.7–15.7)
INTERPRETATION ECG - MUSE: NORMAL
MCH RBC QN AUTO: 30.9 PG (ref 26.5–33)
MCHC RBC AUTO-ENTMCNC: 34.1 G/DL (ref 31.5–36.5)
MCV RBC AUTO: 91 FL (ref 78–100)
P AXIS - MUSE: 23 DEGREES
PLATELET # BLD AUTO: 254 10E3/UL (ref 150–450)
POTASSIUM SERPL-SCNC: 4.2 MMOL/L (ref 3.4–5.3)
PR INTERVAL - MUSE: 178 MS
QRS DURATION - MUSE: 74 MS
QT - MUSE: 404 MS
QTC - MUSE: 460 MS
R AXIS - MUSE: 2 DEGREES
RBC # BLD AUTO: 4.46 10E6/UL (ref 3.8–5.2)
SODIUM SERPL-SCNC: 141 MMOL/L (ref 136–145)
SYSTOLIC BLOOD PRESSURE - MUSE: NORMAL MMHG
T AXIS - MUSE: 47 DEGREES
VENTRICULAR RATE- MUSE: 78 BPM
WBC # BLD AUTO: 5.5 10E3/UL (ref 4–11)

## 2023-07-07 PROCEDURE — 85027 COMPLETE CBC AUTOMATED: CPT | Performed by: INTERNAL MEDICINE

## 2023-07-07 PROCEDURE — 99214 OFFICE O/P EST MOD 30 MIN: CPT | Performed by: INTERNAL MEDICINE

## 2023-07-07 PROCEDURE — 80048 BASIC METABOLIC PNL TOTAL CA: CPT | Performed by: INTERNAL MEDICINE

## 2023-07-07 PROCEDURE — 93005 ELECTROCARDIOGRAM TRACING: CPT | Performed by: INTERNAL MEDICINE

## 2023-07-07 PROCEDURE — 93010 ELECTROCARDIOGRAM REPORT: CPT | Performed by: INTERNAL MEDICINE

## 2023-07-07 PROCEDURE — 36415 COLL VENOUS BLD VENIPUNCTURE: CPT | Performed by: INTERNAL MEDICINE

## 2023-07-07 RX ORDER — LATANOPROST 50 UG/ML
1 SOLUTION/ DROPS OPHTHALMIC EVERY EVENING
COMMUNITY
Start: 2023-05-08

## 2023-07-07 ASSESSMENT — PATIENT HEALTH QUESTIONNAIRE - PHQ9
10. IF YOU CHECKED OFF ANY PROBLEMS, HOW DIFFICULT HAVE THESE PROBLEMS MADE IT FOR YOU TO DO YOUR WORK, TAKE CARE OF THINGS AT HOME, OR GET ALONG WITH OTHER PEOPLE: NOT DIFFICULT AT ALL
SUM OF ALL RESPONSES TO PHQ QUESTIONS 1-9: 0
SUM OF ALL RESPONSES TO PHQ QUESTIONS 1-9: 0

## 2023-07-07 NOTE — PATIENT INSTRUCTIONS
For informational purposes only. Not to replace the advice of your health care provider. Copyright   2003,  [facility]. All rights reserved. Clinically reviewed by Jamee Alaniz MD. Cameo [id number] - REV .  Preparing for Your Surgery  Getting started  A nurse will call you to review your health history and instructions. They will give you an arrival time based on your scheduled surgery time. Please be ready to share:  Your doctor's clinic name and phone number  Your medical, surgical, and anesthesia history  A list of allergies and sensitivities  A list of medicines, including herbal treatments and over-the-counter drugs  Whether the patient has a legal guardian (ask how to send us the papers in advance)  Please tell us if you're pregnant--or if there's any chance you might be pregnant. Some surgeries may injure a fetus (unborn baby), so they require a pregnancy test. Surgeries that are safe for a fetus don't always need a test, and you can choose whether to have one.   If you have a child who's having surgery, please ask for a copy of Preparing for Your Child's Surgery.    Preparing for surgery  Within 10 to 30 days of surgery: Have a pre-op exam (sometimes called an H&P, or History and Physical). This can be done at a clinic or pre-operative center.  If you're having a , you may not need this exam. Talk to your care team.  At your pre-op exam, talk to your care team about all medicines you take. If you need to stop any medicines before surgery, ask when to start taking them again.  We do this for your safety. Many medicines can make you bleed too much during surgery. Some change how well surgery (anesthesia) drugs work.  Call your insurance company to let them know you're having surgery. (If you don't have insurance, call [phone].)  Call your clinic if there's any change in your health. This includes signs of a cold or flu (sore throat, runny nose, cough, rash, fever). It  also includes a scrape or scratch near the surgery site.  If you have questions on the day of surgery, call your hospital or surgery center.  Eating and drinking guidelines  For your safety: Unless your surgeon tells you otherwise, follow the guidelines below.  Eat and drink as usual until 8 hours before you arrive for surgery. After that, no food or milk.  Drink clear liquids until 2 hours before you arrive. These are liquids you can see through, like water, Gatorade, and Propel Water. They also include plain black coffee and tea (no cream or milk), candy, and breath mints. You can spit out gum when you arrive.  If you drink alcohol: Stop drinking it the night before surgery.  If your care team tells you to take medicine on the morning of surgery, it's okay to take it with a sip of water.  Preventing infection  Shower or bathe the night before and morning of your surgery. Follow the instructions your clinic gave you. (If no instructions, use regular soap.)  Don't shave or clip hair near your surgery site. We'll remove the hair if needed.  Don't smoke or vape the morning of surgery. You may chew nicotine gum up to 2 hours before surgery. A nicotine patch is okay.  Note: Some surgeries require you to completely quit smoking and nicotine. Check with your surgeon.  Your care team will make every effort to keep you safe from infection. We will:  Clean our hands often with soap and water (or an alcohol-based hand rub).  Clean the skin at your surgery site with a special soap that kills germs.  Give you a special gown to keep you warm. (Cold raises the risk of infection.)  Wear special hair covers, masks, gowns and gloves during surgery.  Give antibiotic medicine, if prescribed. Not all surgeries need antibiotics.  What to bring on the day of surgery  Photo ID and insurance card  Copy of your health care directive, if you have one  Glasses and hearing aids (bring cases)  You can't wear contacts during surgery  Inhaler and  eye drops, if you use them (tell us about these when you arrive)  CPAP machine or breathing device, if you use them  A few personal items, if spending the night  If you have . . .  A pacemaker, ICD (cardiac defibrillator) or other implant: Bring the ID card.  An implanted stimulator: Bring the remote control.  A legal guardian: Bring a copy of the certified (court-stamped) guardianship papers.  Please remove any jewelry, including body piercings. Leave jewelry and other valuables at home.  If you're going home the day of surgery  You must have a responsible adult drive you home. They should stay with you overnight as well.  If you don't have someone to stay with you, and you aren't safe to go home alone, we may keep you overnight. Insurance often won't pay for this.  After surgery  If it's hard to control your pain or you need more pain medicine, please call your surgeon's office.  Questions?   If you have any questions for your care team, list them here: _________________________________________________________________________________________________________________________________________________________________________ ____________________________________ ____________________________________ ____________________________________    How to Take Your Medication Before Surgery    Discontinue aspirin 1 week prior to your surgery    Avoid over-the-counter NSAIDs including ibuprofen, Motrin, Advil and Aleve for 1 week prior to your surgery.  You may take acetaminophen/Tylenol if you need something for pain that week    On the morning of your surgery, you should take your usual doses of omeprazole, levothyroxine, and citalopram with a small sip of water.  Hold all of your other medications including lisinopril that morning.  You may resume those later in the day.

## 2023-07-07 NOTE — PROGRESS NOTES
[facility]  [address] [place] [id number]  Phone: [phone]  Fax: [phone]  Primary Provider: Valery Quezada  Pre-op Performing Provider: VALERY QUEZADA      PREOPERATIVE EVALUATION:  Today's date: 7/7/2023    Kandi Kim is a 68 year old female who presents for a preoperative evaluation.      7/7/2023     2:28 PM   Additional Questions   Roomed by Cindy HUERTA     Surgical Information:  Surgery/Procedure: Shoulder Arthroscopic Rotator Cuff Repair and Subacromial Decompression - RIGHT  Surgery Location: [facility]  Surgeon: Dr Bashir Malone  Surgery Date: 7-  Time of Surgery: ?  Where patient plans to recover: At home with family  Fax number for surgical facility: [phone]    Assessment & Plan     The proposed surgical procedure is considered INTERMEDIATE risk.    Preoperative examination  68-year-old woman here for preop clearance prior to upcoming right shoulder surgery for torn rotator cuff.  She has had some nausea associated with anesthesia in the past but has otherwise tolerated surgery and anesthesia without any complications.  Her overall risk is low and no contraindications to proceeding.  She will avoid aspirin and NSAIDs for 1 week prior to surgery.  DVT prophylaxis with early ambulation and resuming aspirin.  - EKG 12-lead, tracing only  - CBC with platelets; Future  - Basic metabolic panel  (Ca, Cl, CO2, Creat, Gluc, K, Na, BUN); Future    Tear of right rotator cuff, unspecified tear extent, unspecified whether traumatic  Chronic right shoulder pain with torn rotator cuff with plans for surgery as she is not responding to conservative treatment    Primary hypertension  Blood pressure is under excellent control.  She normally takes lisinopril every morning but I am recommending that she hold the ACE inhibitor on the day of surgery to avoid any perioperative hypotension.  She may resume lisinopril postoperatively.  - CBC  with platelets; Future  - Basic metabolic panel  (Ca, Cl, CO2, Creat, Gluc, K, Na, BUN); Future    HONEY (obstructive sleep apnea)  She does have sleep apnea but does not use CPAP.  She uses a mouthguard.    Gastroesophageal reflux disease with esophagitis without hemorrhage  Reflux is well controlled and she is instructed to take her usual dose of omeprazole on the morning of surgery with a small sip of water    Hypothyroidism, unspecified type  She will take her usual dose of levothyroxine on the morning of surgery with a small sip of water                 Antiplatelet or Anticoagulation Medication Instructions:   - aspirin: Discontinue aspirin 7-10 days prior to procedure to reduce bleeding risk. It should be resumed postoperatively.     Additional Medication Instructions:  Discontinue aspirin 1 week prior to your surgery    Avoid over-the-counter NSAIDs including ibuprofen, Motrin, Advil and Aleve for 1 week prior to your surgery.  You may take acetaminophen/Tylenol if you need something for pain that week    On the morning of your surgery, you should take your usual doses of omeprazole, levothyroxine, and citalopram with a small sip of water.  Hold all of your other medications including lisinopril that morning.  You may resume those later in the day.    RECOMMENDATION:  APPROVAL GIVEN to proceed with proposed procedure, without further diagnostic evaluation.    Independent interpretation of a test performed by another physician/other qualified health care professional (not separately reported) - I personally reviewed and interpreted her EKG  Ordering of each unique test  Prescription drug management         Subjective       HPI related to upcoming procedure: 68-year-old woman here for preop prior to right shoulder surgery for torn rotator cuff.  See assessment and plan and information below for further details        7/7/2023    11:50 AM   Preop Questions   1. Have you ever had a heart attack or stroke? No   2.  Have you ever had surgery on your heart or blood vessels, such as a stent placement, a coronary artery bypass, or surgery on an artery in your head, neck, heart, or legs? No   3. Do you have chest pain with activity? No   4. Do you have a history of  heart failure? No   5. Do you currently have a cold, bronchitis or symptoms of other infection? No   6. Do you have a cough, shortness of breath, or wheezing? No   7. Do you or anyone in your family have previous history of blood clots? No   8. Do you or does anyone in your family have a serious bleeding problem such as prolonged bleeding following surgeries or cuts? No   9. Have you ever had problems with anemia or been told to take iron pills? YES -she is taking iron daily for hair growth   10. Have you had any abnormal blood loss such as black, tarry or bloody stools, or abnormal vaginal bleeding? No   11. Have you ever had a blood transfusion? No   12. Are you willing to have a blood transfusion if it is medically needed before, during, or after your surgery? Yes   13. Have you or any of your relatives ever had problems with anesthesia? No   14. Do you have sleep apnea, excessive snoring or daytime drowsiness? YES -sleep apnea using mouthguard   14a. Do you have a CPAP machine? No   15. Do you have any artifical heart valves or other implanted medical devices like a pacemaker, defibrillator, or continuous glucose monitor? No   16. Do you have artificial joints? YES -bilateral knee replacements   17. Are you allergic to latex? No       Health Care Directive:  Patient does not have a Health Care Directive or Living Will:     Preoperative Review of :   reviewed - no record of controlled substances prescribed.          Review of Systems  12 point review of systems is negative other than what is discussed in the assessment and plan    Denies any exertional chest pain or increasing shortness of breath        Patient Active Problem List    Diagnosis Date Noted      Tear of right rotator cuff 06/22/2023     Priority: Medium     Formatting of this note might be different from the original.  Added automatically from request for surgery [id number]       Impingement syndrome of right shoulder 06/22/2023     Priority: Medium     Formatting of this note might be different from the original.  Added automatically from request for surgery [id number]       Primary osteoarthritis involving multiple joints 07/21/2022     Priority: Medium     Bilateral TKA and chronic pain involving left ankle and right shoulder       Open-angle glaucoma of both eyes 07/21/2022     Priority: Medium     Class 2 severe obesity with serious comorbidity and body mass index (BMI) of 37.0 to 37.9 in adult (H) 07/21/2022     Priority: Medium     Status post total bilateral knee replacement 03/14/2022     Priority: Medium     History of colon polyps 03/11/2022     Priority: Medium     Colonoscopy December 2018 with multiple polyps.  Colonoscopy March 2022 with one adenomatous polyp and several hyperplastic polyps       Bilateral hearing loss 03/26/2021     Priority: Medium     She has hearing aids         GERD with esophagitis      Priority: Medium     Complaints of dysphagia with EGD showing esophagitis.  Negative for   eosinophilic esophagitis September 2019         Solitary kidney, acquired 04/26/2016     Priority: Medium     Formatting of this note might be different from the original.  Solitary kidney, acquired - transplant donor         HTN (hypertension)      Priority: Medium     Hypothyroidism      Priority: Medium     Hyperlipidemia      Priority: Medium     HONEY (obstructive sleep apnea)      Priority: Medium     Postmenopausal symptoms      Priority: Medium     Chronic sinusitis      Priority: Medium      Past Medical History:   Diagnosis Date     Acute torn meniscus of knee, left, initial encounter 02/08/2018     Acute torn meniscus of knee, left, sequela 05/14/2019     Bilateral hearing loss 03/26/2021     Hearing aids     Chest pain 03/14/2022     Chronic left shoulder pain 05/03/2021     Chronic sinusitis      Class 2 severe obesity with serious comorbidity and body mass index (BMI) of 37.0 to 37.9 in adult (H) 07/21/2022     Diverticulitis 08/09/2016    CT scan normal     GERD with esophagitis     Complaints of dysphagia with EGD showing esophagitis.  Negative for eosinophilic esophagitis September 2019     History of colon polyps     Colonoscopy December 2018 with multiple polyps.  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     Influenza A 03/14/2022     Migraines      Open-angle glaucoma of both eyes 07/21/2022     HONEY (obstructive sleep apnea)      Osteoarthritis     knees, hands     PONV (postoperative nausea and vomiting)      Postmenopausal symptoms      Primary osteoarthritis involving multiple joints 07/21/2022    Bilateral TKA and chronic pain involving left ankle and right shoulder     Primary osteoarthritis of both knees 03/26/2021    Bilateral TKA     Primary osteoarthritis of right knee 04/12/2016    R TKA     Rectal bleeding 08/09/2016     Rectocele 11/11/2016    Surgery is planned     Screening     DEXA normal 2016 and normal 2022     Situational anxiety      Status post total bilateral knee replacement 03/14/2022     Past Surgical History:   Procedure Laterality Date     ARTHROPLASTY KNEE Right 04/2016     ARTHROPLASTY KNEE Left     jan 2020     ARTHROSCOPY KNEE Bilateral 2011 2014     BUNIONECTOMY       COLPORRHAPHY N/A 02/21/2017    Procedure: POSTERIOR COLPORRHAPHY;  Surgeon: Roxana Frias MD;  Location: [facility] OR;  Service:      HYSTERECTOMY VAGINAL N/A 02/21/2017    Procedure: TOTAL VAGINAL HYSTERECTOMY, BILATERAL SALPINGO-OOPHORECTOMY;  Surgeon: Roxana Frias MD;  Location: [facility] OR;  Service:      MIDDLE EAR SURGERY        "NEPHRECTOMY Left     Kidney donor     OOPHORECTOMY       TUBAL LIGATION       Current Outpatient Medications   Medication Sig Dispense Refill     aspirin 81 MG EC tablet [ASPIRIN 81 MG EC TABLET] Take 81 mg by mouth daily.       atorvastatin (LIPITOR) 20 MG tablet TAKE 1 TABLET BY MOUTH EVERY DAY 90 tablet 1     citalopram (CELEXA) 20 MG tablet TAKE 1 TABLET BY MOUTH EVERY DAY 90 tablet 3     ferrous sulfate (FEROSUL) 325 (65 Fe) MG tablet Take 325 mg by mouth daily       latanoprost (XALATAN) 0.005 % ophthalmic solution Place 1 drop into both eyes every evening       levothyroxine (SYNTHROID/LEVOTHROID) 112 MCG tablet TAKE 1 TABLET BY MOUTH EVERY DAY 90 tablet 2     lisinopril (ZESTRIL) 10 MG tablet TAKE 1 TABLET BY MOUTH EVERY DAY 90 tablet 3     mometasone (ELOCON) 0.1 % external solution APPLY 1 APPLICATION AS DIRECTED TOPICALLY TO SCALP 3 TIMES WEEKLY AT NIGHT       omeprazole (PRILOSEC) 20 MG DR capsule TAKE 1 CAPSULE BY MOUTH EVERY DAY 90 capsule 1     amoxicillin (AMOXIL) 500 MG capsule TAKE 4 CAPSULES (2,000 MG) BY MOUTH ONCE FOR 1 DOSE PRIOR TO DENTIST VISITS 4 capsule 3       No Known Allergies     Social History     Tobacco Use     Smoking status: Never     Smokeless tobacco: Never   Substance Use Topics     Alcohol use: Yes     Comment: Alcoholic Drinks/day: infrequent     Family History   Problem Relation Age of Onset     Cerebrovascular Disease Mother      Breast Cancer Sister 62.00     Breast Cancer Cousin         age in 50's     History   Drug Use No         Objective     /82 (BP Location: Right arm, Patient Position: Sitting, Cuff Size: Adult Regular)   Pulse 80   Temp 98.3  F (36.8  C)   Resp 16   Ht 1.575 m (5' 2\")   Wt 93 kg (205 lb)   SpO2 99%   BMI 37.49 kg/m      Physical Exam  GENERAL APPEARANCE: healthy, alert and no distress  HENT: Normal  RESP: lungs clear to auscultation - no rales, rhonchi or wheezes  CV: regular rate and rhythm, normal S1 S2, no S3 or S4 and no murmur, " click or rub.  No carotid bruits.  No peripheral edema and good pedal pulses  ABDOMEN: soft, nontender, no HSM or masses and bowel sounds normal  NEURO: Normal strength and tone, sensory exam grossly normal, mentation intact and speech normal        Diagnostics:  Labs-  Hemoglobin 13.8 platelet 254  Potassium 4.2 creatinine 0.96     EKG: Normal sinus rhythm, rate 78.  Again seen is poor R wave progression across anterior leads but unchanged from previous EKGs and normal previous cardiac evaluation    Revised Cardiac Risk Index (RCRI):  The patient has the following serious cardiovascular risks for perioperative complications:   - No serious cardiac risks = 0 points     RCRI Interpretation: 0 points: Class I (very low risk - 0.4% complication rate)           Signed Electronically by: Ho Quezada MD  Copy of this evaluation report is provided to requesting physician.      Answers for HPI/ROS submitted by the patient on 7/7/2023  If you checked off any problems, how difficult have these problems made it for you to do your work, take care of things at home, or get along with other people?: Not difficult at all  PHQ9 TOTAL SCORE: 0

## 2023-08-01 ENCOUNTER — OFFICE VISIT (OUTPATIENT)
Dept: INTERNAL MEDICINE | Facility: CLINIC | Age: 68
End: 2023-08-01
Payer: COMMERCIAL

## 2023-08-01 DIAGNOSIS — H61.21 IMPACTED CERUMEN OF RIGHT EAR: Primary | ICD-10-CM

## 2023-08-01 PROCEDURE — 69209 REMOVE IMPACTED EAR WAX UNI: CPT | Mod: RT | Performed by: NURSE PRACTITIONER

## 2023-08-02 NOTE — PROGRESS NOTES
Assessment & Plan     Impacted cerumen of right ear  Ear lavage was successfully performed by the clinical assistant today without the use of instrumentation.  Follow-up as needed.    Of note, epic hyperspace was down during today's visit and this note was completed retroactively  - REMOVE IMPACTED CERUMEN    Rm Bentley, [facility]    Nhung Chau is a 68 year old, presenting for the following health issues:  No chief complaint on file.    HPI     Here today because she has a subjective feeling of fullness and aching in her right ear.  Denies fevers.          Review of Systems   Constitutional, HEENT, cardiovascular, pulmonary, gi and gu systems are negative, except as otherwise noted.      Objective    There were no vitals taken for this visit.  There is no height or weight on file to calculate BMI.  Physical Exam   Prelavage: Right ear completely impacted with cerumen.  Post lavage: Right TM is normal with no evidence of infection

## 2023-08-28 ENCOUNTER — OFFICE VISIT (OUTPATIENT)
Dept: INTERNAL MEDICINE | Facility: CLINIC | Age: 68
End: 2023-08-28
Payer: COMMERCIAL

## 2023-08-28 VITALS
RESPIRATION RATE: 18 BRPM | SYSTOLIC BLOOD PRESSURE: 118 MMHG | HEART RATE: 90 BPM | BODY MASS INDEX: 37.37 KG/M2 | HEIGHT: 62 IN | TEMPERATURE: 98.3 F | DIASTOLIC BLOOD PRESSURE: 66 MMHG | OXYGEN SATURATION: 94 % | WEIGHT: 203.1 LBS

## 2023-08-28 DIAGNOSIS — R21 RASH: ICD-10-CM

## 2023-08-28 DIAGNOSIS — R04.0 EPISTAXIS: Primary | ICD-10-CM

## 2023-08-28 DIAGNOSIS — E78.5 HYPERLIPIDEMIA, UNSPECIFIED HYPERLIPIDEMIA TYPE: ICD-10-CM

## 2023-08-28 PROCEDURE — 99213 OFFICE O/P EST LOW 20 MIN: CPT | Performed by: INTERNAL MEDICINE

## 2023-08-28 NOTE — PROGRESS NOTES
"  Assessment & Plan     Epistaxis  68-year-old woman here after episode of epistaxis that occurred recently when [place].  Acute onset of vigorous bleeding out of her left nostril that would not stop for nearly 1 hour.   There were large clots.  She did go to the ER but by the time she got there the bleeding is stopped.  No trauma precipitating.  No previous history of epistaxis nor she had any recurrence.  She does take aspirin 81 mg daily.    Recent CBC with normal platelet count.  She states that she does tend to bruise and bleed easily.  No smoking history.  Denies any other sinus symptoms.  Exam is normal.  At this point, I would not recommend ENT evaluation unless epistaxis is recurrent.  However, I would recommend stopping the use of aspirin which I am sure was a contributing factor.  Should she have any recurrent epistaxis, I will refer her to ENT.    Hyperlipidemia, unspecified hyperlipidemia type  She takes atorvastatin daily.  We discussed the pros and cons of continuing aspirin and in light of the recent epistaxis, I do not feel strongly that she needs to continue on aspirin.  She has no history of coronary artery disease or peripheral arterial disease nor any diabetes.  She was using aspirin for prophylaxis with her family history but atorvastatin should provide adequate protection.  We discussed that there are significant risks of daily aspirin use including intracerebral hemorrhage and GI bleeding.      Right arm rash following recent shoulder surgery.  She can continue using the steroid cream that she was prescribed as rash is resolving.        BMI:   Estimated body mass index is 37.15 kg/m  as calculated from the following:    Height as of this encounter: 1.575 m (5' 2\").    Weight as of this encounter: 92.1 kg (203 lb 1.6 oz).       See Patient Instructions    Ho Quezada MD  [facility]    Nhung Chau is a 68 year old, " presenting for the following health issues:  Nose Bleeds (X 8 days ago heavy nose bleed for 55 mins) and Rash (Right arm- had recent surgery and thinks rash came from arm sling)     Answers submitted by the patient for this visit:  General Questionnaire (Submitted on 8/27/2023)  Chief Complaint: Chronic problems general questions HPI Form  How many servings of fruits and vegetables do you eat daily?: 2-3  On average, how many sweetened beverages do you drink each day (Examples: soda, juice, sweet tea, etc.  Do NOT count diet or artificially sweetened beverages)?: 0  How many minutes a day do you exercise enough to make your heart beat faster?: 30 to 60  How many days a week do you exercise enough to make your heart beat faster?: 3 or less  How many days per week do you miss taking your medication?: 0  General Concern (Submitted on 8/27/2023)  Chief Complaint: Chronic problems general questions HPI Form  What is the reason for your visit today?: Bloody nose - 55 minutes with large amount of bleeding  When did your symptoms begin?: 1-2 weeks ago  What are your symptoms?: N/A  How would you describe these symptoms?: Severe  Are your symptoms:: Improving  Have you had these symptoms before?: No  Is there anything that makes you feel worse?: N:A  Is there anything that makes you feel better?: N/A 8/28/2023     3:54 PM   Additional Questions   Roomed by Charissa RODRIGUEZ MA       Rash  Associated symptoms include a rash.   History of Present Illness       Reason for visit:  Bloody nose - 55 minutes with large amount of bleeding  Symptom onset:  1-2 weeks ago  Symptoms include:  N/A  Symptom intensity:  Severe  Symptom progression:  Improving  Had these symptoms before:  No  What makes it worse:  N:A  What makes it better:  N/A    She eats 2-3 servings of fruits and vegetables daily.She consumes 0 sweetened beverage(s) daily.She exercises with enough effort to increase her heart rate 30 to 60 minutes per day.  She exercises with  "enough effort to increase her heart rate 3 or less days per week.   She is taking medications regularly.           Review of Systems   Skin:  Positive for rash.      Objective    /66 (BP Location: Right arm, Patient Position: Sitting, Cuff Size: Adult Large)   Pulse 90   Temp 98.3  F (36.8  C) (Oral)   Resp 18   Ht 1.575 m (5' 2\")   Wt 92.1 kg (203 lb 1.6 oz)   SpO2 94%   BMI 37.15 kg/m    Body mass index is 37.15 kg/m .  Physical Exam   Resolving nonspecific erythematous rash right arm  HEENT normal.  Normal left nostril without lesion identified          "

## 2023-09-25 ASSESSMENT — ENCOUNTER SYMPTOMS
PARESTHESIAS: 0
MYALGIAS: 0
CONSTIPATION: 0
NERVOUS/ANXIOUS: 0
CHILLS: 0
SORE THROAT: 0
COUGH: 0
FREQUENCY: 0
HEADACHES: 0
EYE PAIN: 1
DIARRHEA: 0
DIZZINESS: 0
ARTHRALGIAS: 0
HEMATURIA: 0
ABDOMINAL PAIN: 0
JOINT SWELLING: 0
HEARTBURN: 0
SHORTNESS OF BREATH: 0
PALPITATIONS: 0
FEVER: 0
HEMATOCHEZIA: 0
WEAKNESS: 0
DYSURIA: 0
NAUSEA: 0
BREAST MASS: 0

## 2023-09-25 ASSESSMENT — ACTIVITIES OF DAILY LIVING (ADL): CURRENT_FUNCTION: NO ASSISTANCE NEEDED

## 2023-09-25 ASSESSMENT — PATIENT HEALTH QUESTIONNAIRE - PHQ9
SUM OF ALL RESPONSES TO PHQ QUESTIONS 1-9: 0
SUM OF ALL RESPONSES TO PHQ QUESTIONS 1-9: 0
10. IF YOU CHECKED OFF ANY PROBLEMS, HOW DIFFICULT HAVE THESE PROBLEMS MADE IT FOR YOU TO DO YOUR WORK, TAKE CARE OF THINGS AT HOME, OR GET ALONG WITH OTHER PEOPLE: NOT DIFFICULT AT ALL

## 2023-09-26 ENCOUNTER — OFFICE VISIT (OUTPATIENT)
Dept: INTERNAL MEDICINE | Facility: CLINIC | Age: 68
End: 2023-09-26
Payer: COMMERCIAL

## 2023-09-26 VITALS
HEIGHT: 62 IN | TEMPERATURE: 98.3 F | BODY MASS INDEX: 37.73 KG/M2 | SYSTOLIC BLOOD PRESSURE: 126 MMHG | HEART RATE: 77 BPM | RESPIRATION RATE: 18 BRPM | OXYGEN SATURATION: 97 % | DIASTOLIC BLOOD PRESSURE: 76 MMHG | WEIGHT: 205 LBS

## 2023-09-26 DIAGNOSIS — H40.10X0 OPEN-ANGLE GLAUCOMA OF BOTH EYES, UNSPECIFIED GLAUCOMA STAGE, UNSPECIFIED OPEN-ANGLE GLAUCOMA TYPE: ICD-10-CM

## 2023-09-26 DIAGNOSIS — E03.9 HYPOTHYROIDISM, UNSPECIFIED TYPE: ICD-10-CM

## 2023-09-26 DIAGNOSIS — R04.0 EPISTAXIS: ICD-10-CM

## 2023-09-26 DIAGNOSIS — H91.93 BILATERAL HEARING LOSS, UNSPECIFIED HEARING LOSS TYPE: ICD-10-CM

## 2023-09-26 DIAGNOSIS — K21.00 GASTROESOPHAGEAL REFLUX DISEASE WITH ESOPHAGITIS WITHOUT HEMORRHAGE: ICD-10-CM

## 2023-09-26 DIAGNOSIS — I10 PRIMARY HYPERTENSION: ICD-10-CM

## 2023-09-26 DIAGNOSIS — G47.33 OSA (OBSTRUCTIVE SLEEP APNEA): ICD-10-CM

## 2023-09-26 DIAGNOSIS — M15.0 PRIMARY OSTEOARTHRITIS INVOLVING MULTIPLE JOINTS: ICD-10-CM

## 2023-09-26 DIAGNOSIS — Z00.00 ENCOUNTER FOR MEDICARE ANNUAL WELLNESS EXAM: Primary | ICD-10-CM

## 2023-09-26 DIAGNOSIS — R73.01 IMPAIRED FASTING GLUCOSE: ICD-10-CM

## 2023-09-26 DIAGNOSIS — Z86.0100 HISTORY OF COLON POLYPS: ICD-10-CM

## 2023-09-26 DIAGNOSIS — E78.5 HYPERLIPIDEMIA, UNSPECIFIED HYPERLIPIDEMIA TYPE: ICD-10-CM

## 2023-09-26 DIAGNOSIS — E66.01 CLASS 2 SEVERE OBESITY WITH SERIOUS COMORBIDITY AND BODY MASS INDEX (BMI) OF 37.0 TO 37.9 IN ADULT, UNSPECIFIED OBESITY TYPE (H): ICD-10-CM

## 2023-09-26 DIAGNOSIS — E66.812 CLASS 2 SEVERE OBESITY WITH SERIOUS COMORBIDITY AND BODY MASS INDEX (BMI) OF 37.0 TO 37.9 IN ADULT, UNSPECIFIED OBESITY TYPE (H): ICD-10-CM

## 2023-09-26 DIAGNOSIS — Z51.81 ENCOUNTER FOR THERAPEUTIC DRUG MONITORING: ICD-10-CM

## 2023-09-26 PROBLEM — M75.101 TEAR OF RIGHT ROTATOR CUFF: Status: RESOLVED | Noted: 2023-06-22 | Resolved: 2023-09-26

## 2023-09-26 PROBLEM — M75.41 IMPINGEMENT SYNDROME OF RIGHT SHOULDER: Status: RESOLVED | Noted: 2023-06-22 | Resolved: 2023-09-26

## 2023-09-26 LAB
ALBUMIN SERPL BCG-MCNC: 4.5 G/DL (ref 3.5–5.2)
ALBUMIN UR-MCNC: NEGATIVE MG/DL
ALP SERPL-CCNC: 114 U/L (ref 35–104)
ALT SERPL W P-5'-P-CCNC: 18 U/L (ref 0–50)
ANION GAP SERPL CALCULATED.3IONS-SCNC: 14 MMOL/L (ref 7–15)
APPEARANCE UR: CLEAR
APTT PPP: 30 SECONDS (ref 22–38)
AST SERPL W P-5'-P-CCNC: 21 U/L (ref 0–45)
BACTERIA #/AREA URNS HPF: ABNORMAL /HPF
BILIRUB SERPL-MCNC: 0.5 MG/DL
BILIRUB UR QL STRIP: NEGATIVE
BUN SERPL-MCNC: 20 MG/DL (ref 8–23)
CALCIUM SERPL-MCNC: 9.9 MG/DL (ref 8.8–10.2)
CHLORIDE SERPL-SCNC: 103 MMOL/L (ref 98–107)
CHOLEST SERPL-MCNC: 217 MG/DL
COLOR UR AUTO: YELLOW
CREAT SERPL-MCNC: 1.04 MG/DL (ref 0.51–0.95)
DEPRECATED HCO3 PLAS-SCNC: 23 MMOL/L (ref 22–29)
EGFRCR SERPLBLD CKD-EPI 2021: 59 ML/MIN/1.73M2
ERYTHROCYTE [DISTWIDTH] IN BLOOD BY AUTOMATED COUNT: 12.3 % (ref 10–15)
GLUCOSE SERPL-MCNC: 116 MG/DL (ref 70–99)
GLUCOSE UR STRIP-MCNC: NEGATIVE MG/DL
HBA1C MFR BLD: 5.6 % (ref 0–5.6)
HCT VFR BLD AUTO: 40.4 % (ref 35–47)
HDLC SERPL-MCNC: 48 MG/DL
HGB BLD-MCNC: 13.6 G/DL (ref 11.7–15.7)
HGB UR QL STRIP: ABNORMAL
INR PPP: 0.95 (ref 0.85–1.15)
IRON BINDING CAPACITY (ROCHE): 282 UG/DL (ref 240–430)
IRON SATN MFR SERPL: 54 % (ref 15–46)
IRON SERPL-MCNC: 152 UG/DL (ref 37–145)
KETONES UR STRIP-MCNC: NEGATIVE MG/DL
LDLC SERPL CALC-MCNC: 128 MG/DL
LEUKOCYTE ESTERASE UR QL STRIP: NEGATIVE
MCH RBC QN AUTO: 30.2 PG (ref 26.5–33)
MCHC RBC AUTO-ENTMCNC: 33.7 G/DL (ref 31.5–36.5)
MCV RBC AUTO: 90 FL (ref 78–100)
NITRATE UR QL: NEGATIVE
NONHDLC SERPL-MCNC: 169 MG/DL
PH UR STRIP: 5.5 [PH] (ref 5–8)
PLATELET # BLD AUTO: 263 10E3/UL (ref 150–450)
POTASSIUM SERPL-SCNC: 4.3 MMOL/L (ref 3.4–5.3)
PROT SERPL-MCNC: 7.2 G/DL (ref 6.4–8.3)
RBC # BLD AUTO: 4.51 10E6/UL (ref 3.8–5.2)
RBC #/AREA URNS AUTO: ABNORMAL /HPF
SODIUM SERPL-SCNC: 140 MMOL/L (ref 135–145)
SP GR UR STRIP: 1.01 (ref 1–1.03)
SQUAMOUS #/AREA URNS AUTO: ABNORMAL /LPF
T4 FREE SERPL-MCNC: 1.07 NG/DL (ref 0.9–1.7)
TRIGL SERPL-MCNC: 205 MG/DL
TSH SERPL DL<=0.005 MIU/L-ACNC: 12.3 UIU/ML (ref 0.3–4.2)
UROBILINOGEN UR STRIP-ACNC: 0.2 E.U./DL
WBC # BLD AUTO: 5.9 10E3/UL (ref 4–11)
WBC #/AREA URNS AUTO: ABNORMAL /HPF

## 2023-09-26 PROCEDURE — 80061 LIPID PANEL: CPT | Performed by: INTERNAL MEDICINE

## 2023-09-26 PROCEDURE — 83550 IRON BINDING TEST: CPT | Performed by: INTERNAL MEDICINE

## 2023-09-26 PROCEDURE — 83540 ASSAY OF IRON: CPT | Performed by: INTERNAL MEDICINE

## 2023-09-26 PROCEDURE — 36415 COLL VENOUS BLD VENIPUNCTURE: CPT | Performed by: INTERNAL MEDICINE

## 2023-09-26 PROCEDURE — G0439 PPPS, SUBSEQ VISIT: HCPCS | Performed by: INTERNAL MEDICINE

## 2023-09-26 PROCEDURE — 85610 PROTHROMBIN TIME: CPT | Performed by: INTERNAL MEDICINE

## 2023-09-26 PROCEDURE — 84443 ASSAY THYROID STIM HORMONE: CPT | Performed by: INTERNAL MEDICINE

## 2023-09-26 PROCEDURE — 80053 COMPREHEN METABOLIC PANEL: CPT | Performed by: INTERNAL MEDICINE

## 2023-09-26 PROCEDURE — 83036 HEMOGLOBIN GLYCOSYLATED A1C: CPT | Performed by: INTERNAL MEDICINE

## 2023-09-26 PROCEDURE — 85027 COMPLETE CBC AUTOMATED: CPT | Performed by: INTERNAL MEDICINE

## 2023-09-26 PROCEDURE — G0008 ADMIN INFLUENZA VIRUS VAC: HCPCS | Performed by: INTERNAL MEDICINE

## 2023-09-26 PROCEDURE — 90662 IIV NO PRSV INCREASED AG IM: CPT | Performed by: INTERNAL MEDICINE

## 2023-09-26 PROCEDURE — 81001 URINALYSIS AUTO W/SCOPE: CPT | Performed by: INTERNAL MEDICINE

## 2023-09-26 PROCEDURE — 85730 THROMBOPLASTIN TIME PARTIAL: CPT | Performed by: INTERNAL MEDICINE

## 2023-09-26 PROCEDURE — 99214 OFFICE O/P EST MOD 30 MIN: CPT | Mod: 25 | Performed by: INTERNAL MEDICINE

## 2023-09-26 PROCEDURE — 84439 ASSAY OF FREE THYROXINE: CPT | Performed by: INTERNAL MEDICINE

## 2023-09-26 RX ORDER — LEVOTHYROXINE SODIUM 125 UG/1
125 TABLET ORAL DAILY
Qty: 90 TABLET | Refills: 3 | Status: SHIPPED | OUTPATIENT
Start: 2023-09-26 | End: 2024-08-30

## 2023-09-26 RX ORDER — UREA 10 %
500 LOTION (ML) TOPICAL DAILY
COMMUNITY
Start: 2023-09-26 | End: 2023-12-01

## 2023-09-26 ASSESSMENT — ACTIVITIES OF DAILY LIVING (ADL): CURRENT_FUNCTION: NO ASSISTANCE NEEDED

## 2023-09-26 ASSESSMENT — ENCOUNTER SYMPTOMS
DIZZINESS: 0
CONSTIPATION: 0
HEARTBURN: 0
SHORTNESS OF BREATH: 0
ABDOMINAL PAIN: 0
COUGH: 0
ARTHRALGIAS: 0
SORE THROAT: 0
HEADACHES: 0
PARESTHESIAS: 0
BREAST MASS: 0
MYALGIAS: 0
NERVOUS/ANXIOUS: 0
DYSURIA: 0
FEVER: 0
HEMATURIA: 0
NAUSEA: 0
EYE PAIN: 1
JOINT SWELLING: 0
WEAKNESS: 0
PALPITATIONS: 0
FREQUENCY: 0
CHILLS: 0
HEMATOCHEZIA: 0
DIARRHEA: 0

## 2023-09-26 NOTE — PATIENT INSTRUCTIONS
Annual flu shot every fall (provided today)     You are due to get a tetanus vaccine updated.  You can schedule a Td vaccine at your pharmacy     Recommending new COVID vaccine.  You should also consider getting the new RSV vaccine     Recommending shingles vaccine, Shingrix.  This can be scheduled at your pharmacy     Annual mammogram     Continue to see your eye doctor every 6 to 12 months     I would recommend seeing a dermatologist every 1 to 2 years for total-body skin exam     Colonoscopy should be repeated in 2027     Patient Education   Personalized Prevention Plan  You are due for the preventive services outlined below.  Your care team is available to assist you in scheduling these services.  If you have already completed any of these items, please share that information with your care team to update in your medical record.  Health Maintenance Due   Topic Date Due    Zoster (Shingles) Vaccine (2 of 3) 12/19/2017    COVID-19 Vaccine (4 - Pfizer series) 04/05/2022    Diptheria Tetanus Pertussis (DTAP/TDAP/TD) Vaccine (2 - Td or Tdap) 01/03/2023    Thyroid Function Lab  07/21/2023    Flu Vaccine (1) 09/01/2023     Learning About Dietary Guidelines  What are the Dietary Guidelines for Americans?     Dietary Guidelines for Americans provide tips for eating well and staying healthy. This helps reduce the risk for long-term (chronic) diseases.  These guidelines recommend that you:  Eat and drink the right amount for you. The U.[place] government's food guide is called MyPlate. It can help you make your own well-balanced eating plan.  Try to balance your eating with your activity. This helps you stay at a healthy weight.  Drink alcohol in moderation, if at all.  Limit foods high in salt, saturated fat, trans fat, and added sugar.  These guidelines are from the U.S. Department of Agriculture and the U.S. Department of Health and Human Services. They are updated every 5 years.  What is MyPlate?  MyPlate is the [place]  "government's food guide. It can help you make your own well-balanced eating plan. A balanced eating plan means that you eat enough, but not too much, and that your food gives you the nutrients you need to stay healthy.  MyPlate focuses on eating plenty of whole grains, fruits, and vegetables, and on limiting fat and sugar. It is available online at www.ChooseMyPlate.gov.  How can you get started?  If you're trying to eat healthier, you can slowly change your eating habits over time. You don't have to make big changes all at once. Start by adding one or two healthy foods to your meals each day.  Grains  Choose whole-grain breads and cereals and whole-wheat pasta and whole-grain crackers.  Vegetables  Eat a variety of vegetables every day. They have lots of nutrients and are part of a heart-healthy diet.  Fruits  Eat a variety of fruits every day. Fruits contain lots of nutrients. Choose fresh fruit instead of fruit juice.  Protein foods  Choose fish and lean poultry more often. Eat red meat and fried meats less often. Dried beans, tofu, and nuts are also good sources of protein.  Dairy  Choose low-fat or fat-free products from this food group. If you have problems digesting milk, try eating cheese or yogurt instead.  Fats and oils  Limit fats and oils if you're trying to cut calories. Choose healthy fats when you cook. These include canola oil and olive oil.  Where can you learn more?  Go to https://www.Moki.tv.net/patiented  Enter D676 in the search box to learn more about \"Learning About Dietary Guidelines.\"  Current as of: March 1, 2023               Content Version: 13.7    5876-8533 [employer].   Care instructions adapted under license by your healthcare professional. If you have questions about a medical condition or this instruction, always ask your healthcare professional. [employer] disclaims any warranty or liability for your use of this information.         "

## 2023-09-26 NOTE — PROGRESS NOTES
"SUBJECTIVE:   Kandi is a 68 year old who presents for Preventive Visit.    68-year-old woman here for annual wellness visit and to follow-up medical problems including hypertension, hypothyroidism, osteoarthritis and other issues.  See assessment and plan for details.          Are you in the first 12 months of your Medicare coverage?  No    Healthy Habits:     In general, how would you rate your overall health?  Good    Frequency of exercise:  2-3 days/week    Duration of exercise:  45-60 minutes    Do you usually eat at least 4 servings of fruit and vegetables a day, include whole grains    & fiber and avoid regularly eating high fat or \"junk\" foods?  No    Taking medications regularly:  Yes    Medication side effects:  Not applicable    Ability to successfully perform activities of daily living:  No assistance needed    Home Safety:  No safety concerns identified    Hearing Impairment:  No hearing concerns    In the past 6 months, have you been bothered by leaking of urine?  No    In general, how would you rate your overall mental or emotional health?  Good    Additional concerns today:  No      Today's PHQ-9 Score:       9/25/2023    10:22 AM   PHQ-9 SCORE   PHQ-9 Total Score MyChart 0   PHQ-9 Total Score 0           Have you ever done Advance Care Planning? (For example, a Health Directive, POLST, or a discussion with a medical provider or your loved ones about your wishes): No, advance care planning information given to patient to review.  Patient plans to discuss their wishes with loved ones or provider.         Fall risk  Fallen 2 or more times in the past year?: No  Any fall with injury in the past year?: No    Cognitive Screening   1) Repeat 3 items (Leader, Season, Table)    2) Clock draw: NORMAL  3) 3 item recall: Recalls 2 objects   Results: NORMAL clock, 1-2 items recalled: COGNITIVE IMPAIRMENT LESS LIKELY    Mini-CogTM Copyright S [employer]. Licensed by the author for use in [facility]; " reprinted with permission (soob@.[facility]). All rights reserved.      Do you have sleep apnea, excessive snoring or daytime drowsiness? : yes    Reviewed and updated as needed this visit by clinical staff   Tobacco  Allergies  Meds  Problems  Med Hx  Surg Hx  Fam Hx          Reviewed and updated as needed this visit by Provider   Tobacco  Allergies  Meds  Problems  Med Hx  Surg Hx  Fam Hx         Social History     Tobacco Use    Smoking status: Never    Smokeless tobacco: Never   Substance Use Topics    Alcohol use: Yes     Comment: Alcoholic Drinks/day: infrequent             9/25/2023    10:25 AM   Alcohol Use   Prescreen: >3 drinks/day or >7 drinks/week? No     Do you have a current opioid prescription? No  Do you use any other controlled substances or medications that are not prescribed by a provider? None              Current providers sharing in care for this patient include:   Patient Care Team:  Ho Quezada MD as PCP - General  Ho Quezada MD as Assigned PCP    The following health maintenance items are reviewed in Epic and correct as of today:  Health Maintenance   Topic Date Due    ZOSTER IMMUNIZATION (2 of 3) 12/19/2017    COVID-19 Vaccine (4 - Pfizer series) 04/05/2022    DTAP/TDAP/TD IMMUNIZATION (2 - Td or Tdap) 01/03/2023    PHQ-9  03/26/2024    ANNUAL REVIEW OF HM ORDERS  07/07/2024    MEDICARE ANNUAL WELLNESS VISIT  09/26/2024    TSH W/FREE T4 REFLEX  09/26/2024    FALL RISK ASSESSMENT  09/26/2024    COLORECTAL CANCER SCREENING  02/25/2025    MAMMO SCREENING  05/02/2025    LIPID  09/26/2028    ADVANCE CARE PLANNING  09/26/2028    DEXA  08/05/2037    HEPATITIS C SCREENING  Completed    INFLUENZA VACCINE  Completed    Pneumococcal Vaccine: 65+ Years  Completed    IPV IMMUNIZATION  Aged Out    HPV IMMUNIZATION  Aged Out    MENINGITIS IMMUNIZATION  Aged Out     Lab work is in process          7/17/2022     3:53 PM   Breast CA Risk Assessment (FHS-7)   Do you have a family  "history of breast, colon, or ovarian cancer? No / Unknown           Pertinent mammograms are reviewed under the imaging tab.    Review of Systems   Constitutional:  Negative for chills and fever.   HENT:  Negative for congestion, ear pain, hearing loss and sore throat.    Eyes:  Positive for pain. Negative for visual disturbance.   Respiratory:  Negative for cough and shortness of breath.    Cardiovascular:  Negative for chest pain, palpitations and peripheral edema.   Gastrointestinal:  Negative for abdominal pain, constipation, diarrhea, heartburn, hematochezia and nausea.   Breasts:  Negative for tenderness, breast mass and discharge.   Genitourinary:  Negative for dysuria, frequency, genital sores, hematuria, pelvic pain, urgency, vaginal bleeding and vaginal discharge.   Musculoskeletal:  Negative for arthralgias, joint swelling and myalgias.   Skin:  Negative for rash.   Neurological:  Negative for dizziness, weakness, headaches and paresthesias.   Psychiatric/Behavioral:  Negative for mood changes. The patient is not nervous/anxious.          OBJECTIVE:   /76   Pulse 77   Temp 98.3  F (36.8  C)   Resp 18   Ht 1.575 m (5' 2\")   Wt 93 kg (205 lb)   SpO2 97%   BMI 37.49 kg/m   Estimated body mass index is 37.49 kg/m  as calculated from the following:    Height as of this encounter: 1.575 m (5' 2\").    Weight as of this encounter: 93 kg (205 lb).  Physical Exam  EYES: Eyelids, conjunctiva, and sclera were normal. Pupils were normal. Cornea, iris, and lens were normal bilaterally.  HEAD, EARS, NOSE, MOUTH, AND THROAT: Head and face were normal. TMs and external auditory canals are normal  NECK: Neck appearance was normal. There were no neck masses and the thyroid was not enlarged and no nodules are felt.  No lymphadenopathy.  RESPIRATORY: Breathing pattern was normal and the chest moved symmetrically.   Lung sounds were normal and there were no rales or wheezes.  CARDIOVASCULAR: Heart rate and rhythm " were normal.  S1 and S2 were normal and there were no extra sounds or murmurs. Peripheral pulses in arms and legs were normal.  There was no peripheral edema.  No carotid bruits.  GASTROINTESTINAL:  Bowel sounds were present.   Palpation detected no tenderness, mass, or enlarged organs.   MUSCULOSKELETAL: Skeletal configuration was normal and muscle mass was normal for age. Joint appearance was overall normal.  LYMPHATIC: There were no enlarged nodes.  SKIN/HAIR/NAILS: Skin color was normal.  There were no concerning skin lesions.  NEUROLOGIC: The patient was alert and oriented to person, place, time, and circumstance. Speech was normal. Cranial nerves were normal. Motor strength was normal for age. The patient was normally coordinated.  Sensation intact.  PSYCHIATRIC:  Mood and affect were normal and the patient had normal recent and remote memory. The patient's judgment and insight were normal.         ASSESSMENT / PLAN:   1. Encounter for Medicare annual wellness exam  Immunizations are reviewed and providing flu shot.  Recommending COVID-vaccine, RSV vaccine, Shingrix and getting tetanus updated. Living will discussed.  Non-smoker.  She does not use alcohol.  Regular exercise and good diet habits to maintain a healthy weight discussed.  Up to date with colonoscopies and this should be repeated in 2027.  Recommending annual mammogram for breast cancer screening.   Last DEXA was 2022. Dementia and depression screening completed.  Recommending annual eye exam.  Recommending seeing a dentist every 6 months.  Skin exam performed and recommending regular use of sunblock.  Recommending seeing a dermatologist every 1 to 2 years.  Hepatitis C antibody for screening was normal.  Will screen for diabetes with fasting glucose.     2. Primary hypertension  Blood pressure is looking well controlled with current dose of lisinopril taking 10 mg daily.  Tolerating medication without side effects.  Checking appropriate monitoring  labs.  - CBC with platelets; Future  - Comprehensive metabolic panel (BMP + Alb, Alk Phos, ALT, AST, Total. Bili, TP); Future  - UA Macroscopic with reflex to Microscopic and Culture - Lab Collect; Future  - CBC with platelets  - Comprehensive metabolic panel (BMP + Alb, Alk Phos, ALT, AST, Total. Bili, TP)  - UA Macroscopic with reflex to Microscopic and Culture - Lab Collect  - UA Microscopic with Reflex to Culture    3. Class 2 severe obesity with serious comorbidity and body mass index (BMI) of 37.0 to 37.9 in adult, unspecified obesity type (H)  We discussed the importance of diet and exercise for weight loss.  BMI remains 37.4.  She is up a couple pounds from last time.    4. Hypothyroidism, unspecified type  Rechecking TSH on thyroid replacement.  We will adjust dose of levothyroxine if needed  - TSH WITH FREE T4 REFLEX; Future  - TSH WITH FREE T4 REFLEX  - T4 free    5. Primary osteoarthritis involving multiple joints  She has had both knees replaced.  She is anticipating ankle surgery and I am suggesting Dr. Feng at [facility]  - cyanocobalamin (VITAMIN B-12) 500 MCG tablet; Take 1 tablet (500 mcg) by mouth daily    6. HONEY (obstructive sleep apnea)  She wears a mouth appliance and is in the process of getting a replacement    7. Open-angle glaucoma of both eyes, unspecified glaucoma stage, unspecified open-angle glaucoma type  She is seeing her ophthalmologist every 6 months    8. Gastroesophageal reflux disease with esophagitis without hemorrhage  Reflux is controlled with omeprazole    9. Hyperlipidemia, unspecified hyperlipidemia type  Recheck lipid profile taking atorvastatin.  Tolerating medication without side effects  - Lipid Profile (Chol, Trig, HDL, LDL calc); Future  - Lipid Profile (Chol, Trig, HDL, LDL calc)    10. Epistaxis  Recent epistaxis has not recurred.  Suspect aspirin related and this was discontinued.  She is asking if I can check her INR and PTT although she has no history  "of liver dysfunction  - INR; Future  - Partial thromboplastin time; Future  - INR  - Partial thromboplastin time    11. Bilateral hearing loss, unspecified hearing loss type  She has hearing aids    12. History of colon polyps  She will be due for another colonoscopy in 2027    13. Encounter for therapeutic drug monitoring  Monitor iron while taking daily dose.  Also monitor LFTs while on statin  - Iron and iron binding capacity; Future  - Iron and iron binding capacity     Patient has been advised of split billing requirements and indicates understanding: Yes        BMI:   Estimated body mass index is 37.49 kg/m  as calculated from the following:    Height as of this encounter: 1.575 m (5' 2\").    Weight as of this encounter: 93 kg (205 lb).         She reports that she has never smoked. She has never used smokeless tobacco.      Appropriate preventive services were discussed with this patient, including applicable screening as appropriate for cardiovascular disease, diabetes, osteopenia/osteoporosis, and glaucoma.  As appropriate for age/gender, discussed screening for colorectal cancer, prostate cancer, breast cancer, and cervical cancer. Checklist reviewing preventive services available has been given to the patient.    Reviewed patients plan of care and provided an AVS. The Basic Care Plan (routine screening as documented in Health Maintenance) for Kandi meets the Care Plan requirement. This Care Plan has been established and reviewed with the Patient.          Ho Quezada MD  [facility]    Identified Health Risks:  I have reviewed Opioid Use Disorder and Substance Use Disorder risk factors and made any needed referrals. Answers submitted by the patient for this visit:  Patient Health Questionnaire (Submitted on 9/25/2023)  If you checked off any problems, how difficult have these problems made it for you to do your work, take care of things at home, or get along with " other people?: Not difficult at all  PHQ9 TOTAL SCORE: 0  The patient was counseled and encouraged to consider modifying their diet and eating habits. She was provided with information on recommended healthy diet options.

## 2023-10-03 ENCOUNTER — MYC MEDICAL ADVICE (OUTPATIENT)
Dept: INTERNAL MEDICINE | Facility: CLINIC | Age: 68
End: 2023-10-03
Payer: COMMERCIAL

## 2023-10-04 ENCOUNTER — TELEPHONE (OUTPATIENT)
Dept: INTERNAL MEDICINE | Facility: CLINIC | Age: 68
End: 2023-10-04
Payer: COMMERCIAL

## 2023-10-30 ENCOUNTER — PATIENT OUTREACH (OUTPATIENT)
Dept: GASTROENTEROLOGY | Facility: CLINIC | Age: 68
End: 2023-10-30
Payer: COMMERCIAL

## 2023-12-01 ENCOUNTER — OFFICE VISIT (OUTPATIENT)
Dept: INTERNAL MEDICINE | Facility: CLINIC | Age: 68
End: 2023-12-01
Payer: COMMERCIAL

## 2023-12-01 VITALS
HEART RATE: 80 BPM | TEMPERATURE: 98.3 F | WEIGHT: 201 LBS | RESPIRATION RATE: 18 BRPM | HEIGHT: 62 IN | SYSTOLIC BLOOD PRESSURE: 120 MMHG | BODY MASS INDEX: 36.99 KG/M2 | DIASTOLIC BLOOD PRESSURE: 72 MMHG | OXYGEN SATURATION: 98 %

## 2023-12-01 DIAGNOSIS — J01.90 ACUTE SINUSITIS WITH SYMPTOMS > 10 DAYS: ICD-10-CM

## 2023-12-01 DIAGNOSIS — R26.89 BALANCE PROBLEMS: Primary | ICD-10-CM

## 2023-12-01 DIAGNOSIS — E03.9 HYPOTHYROIDISM, UNSPECIFIED TYPE: ICD-10-CM

## 2023-12-01 PROCEDURE — 36415 COLL VENOUS BLD VENIPUNCTURE: CPT | Performed by: INTERNAL MEDICINE

## 2023-12-01 PROCEDURE — 84443 ASSAY THYROID STIM HORMONE: CPT | Performed by: INTERNAL MEDICINE

## 2023-12-01 PROCEDURE — 82607 VITAMIN B-12: CPT | Performed by: INTERNAL MEDICINE

## 2023-12-01 PROCEDURE — 99214 OFFICE O/P EST MOD 30 MIN: CPT | Performed by: INTERNAL MEDICINE

## 2023-12-01 NOTE — PROGRESS NOTES
"  Assessment & Plan     Balance problems  68-year-old woman who has noticed problems with her balance over the last 2 months.  She has had a couple falls during which she tripped over something and could not catch her self from falling.  Denies any vertigo.  No headaches.  Some restless leg symptoms have been noticed.  No neuropathy symptoms.  No new medications.  Neurologic exam is unremarkable with cranial nerves and motor intact along with coordination.  Symptoms seem to coincide with what sounds like a sinus infection and treating that may improve these balance symptoms.  However, I am also checking a vitamin B12 level today.  If her symptoms persist and are not improving with antibiotic and if her B12 level is normal, consider further neurologic evaluation including MRI brain and neurology consult.  - Vitamin B12; Future    Acute sinusitis with symptoms > 10 days  She has had several weeks of pressure and congestion in her sinuses especially on the left side.  She has red tearing left eye and has a follow-up appointment with her eye doctor next week.  I suspect an acute sinus infection which may be partially responsible for the above symptoms.  Will treat with Augmentin 875 mg twice daily for 10 days.  She has tolerated this antibiotic in the past without side effects although we did discuss using probiotics while on the medication.  - amoxicillin-clavulanate (AUGMENTIN) 875-125 MG tablet; Take 1 tablet by mouth 2 times daily for 10 days    Hypothyroidism, unspecified type  She is needing her TSH rechecked after her dose was recently increased to 125 mcg daily when her TSH was found to be elevated at 12.3.  - TSH; Future             BMI:   Estimated body mass index is 36.76 kg/m  as calculated from the following:    Height as of this encounter: 1.575 m (5' 2\").    Weight as of this encounter: 91.2 kg (201 lb).       See Patient Instructions    Ho Quezada MD  [facility] " "VINH Chau is a 68 year old, presenting for the following health issues:  Fall (Recent falls, 2-3 x in he last month, balance seems off, dropping things), Thyroid Disease (Needs thyroid labs done), and Nasal Congestion (Nose and eyes crusty, very green phlegm, congestion x 3 weeks, nose so sore)        12/1/2023     4:31 PM   Additional Questions   Roomed by Cindy HUERTA       History of Present Illness       Reason for visit:  Balance issues, dropping things, thyroid recheck, hope to get a nasal spray  Symptom onset:  3-4 weeks ago  Symptoms include:  Dropping things, balance (fell twice), dry nasal cavity  Symptom intensity:  Mild  Symptom progression:  Worsening  Had these symptoms before:  No    She eats 0-1 servings of fruits and vegetables daily.She consumes 1 sweetened beverage(s) daily.She exercises with enough effort to increase her heart rate 20 to 29 minutes per day.  She exercises with enough effort to increase her heart rate 3 or less days per week.   She is taking medications regularly.                 Review of Systems   No headaches  No fevers or chills  No weakness in any extremity.  No dysarthria      Objective    /72 (BP Location: Right arm, Patient Position: Sitting, Cuff Size: Adult Regular)   Pulse 80   Temp 98.3  F (36.8  C)   Resp 18   Ht 1.575 m (5' 2\")   Wt 91.2 kg (201 lb)   SpO2 98%   BMI 36.76 kg/m    Body mass index is 36.76 kg/m .  Physical Exam   HEENT: Normal bilateral TMs..  Some redness involving left eye and swelling of upper left medial eyelid  No cervical lymphadenopathy  No carotid bruits  Heart regular rate and rhythm  Neurologically intact with cranial nerves and motor along with coordination.      "

## 2023-12-02 LAB
TSH SERPL DL<=0.005 MIU/L-ACNC: 0.34 UIU/ML (ref 0.3–4.2)
VIT B12 SERPL-MCNC: 452 PG/ML (ref 232–1245)

## 2023-12-04 ENCOUNTER — MYC MEDICAL ADVICE (OUTPATIENT)
Dept: INTERNAL MEDICINE | Facility: CLINIC | Age: 68
End: 2023-12-04
Payer: COMMERCIAL

## 2024-01-15 ENCOUNTER — TRANSFERRED RECORDS (OUTPATIENT)
Dept: HEALTH INFORMATION MANAGEMENT | Facility: CLINIC | Age: 69
End: 2024-01-15
Payer: COMMERCIAL

## 2024-01-15 ENCOUNTER — MYC MEDICAL ADVICE (OUTPATIENT)
Dept: INTERNAL MEDICINE | Facility: CLINIC | Age: 69
End: 2024-01-15
Payer: COMMERCIAL

## 2024-01-15 NOTE — TELEPHONE ENCOUNTER
Please see patient my chart message.    Patient requesting referral to ENT for nose bleeds.  Declined appointment with PCP to discuss.  (Last office visit with PCP: 12/1/23).

## 2024-01-25 ENCOUNTER — OFFICE VISIT (OUTPATIENT)
Dept: INTERNAL MEDICINE | Facility: CLINIC | Age: 69
End: 2024-01-25
Payer: COMMERCIAL

## 2024-01-25 VITALS
HEART RATE: 83 BPM | DIASTOLIC BLOOD PRESSURE: 80 MMHG | WEIGHT: 201 LBS | HEIGHT: 62 IN | SYSTOLIC BLOOD PRESSURE: 120 MMHG | RESPIRATION RATE: 16 BRPM | OXYGEN SATURATION: 96 % | TEMPERATURE: 98.4 F | BODY MASS INDEX: 36.99 KG/M2

## 2024-01-25 DIAGNOSIS — K63.89 EPIPLOIC APPENDAGITIS: Primary | ICD-10-CM

## 2024-01-25 DIAGNOSIS — E03.9 HYPOTHYROIDISM, UNSPECIFIED TYPE: ICD-10-CM

## 2024-01-25 DIAGNOSIS — I10 PRIMARY HYPERTENSION: ICD-10-CM

## 2024-01-25 DIAGNOSIS — Z90.5 SOLITARY KIDNEY, ACQUIRED: ICD-10-CM

## 2024-01-25 PROCEDURE — 36415 COLL VENOUS BLD VENIPUNCTURE: CPT | Performed by: INTERNAL MEDICINE

## 2024-01-25 PROCEDURE — 84443 ASSAY THYROID STIM HORMONE: CPT | Performed by: INTERNAL MEDICINE

## 2024-01-25 PROCEDURE — 99214 OFFICE O/P EST MOD 30 MIN: CPT | Performed by: INTERNAL MEDICINE

## 2024-01-25 RX ORDER — RESPIRATORY SYNCYTIAL VIRUS VACCINE 120MCG/0.5
0.5 KIT INTRAMUSCULAR ONCE
Qty: 1 EACH | Refills: 0 | Status: CANCELLED | OUTPATIENT
Start: 2024-01-25 | End: 2024-01-25

## 2024-01-25 NOTE — PATIENT INSTRUCTIONS
You may take ibuprofen 200 mg or Naprosyn/Aleve 220 mg twice daily with food for the next 3 days to help with your pain.  You can also continue Tylenol 500 mg 2 tablets every 8 hours as needed

## 2024-01-25 NOTE — PROGRESS NOTES
Assessment & Plan     Epiploic appendagitis  68-year-old woman with history of diverticulitis who is here to follow-up after being evaluated at urgent care for left lower quadrant abdominal pain with suspicion for acute diverticulitis recurrence.  However, CT of her abdomen/pelvis revealed epiploic appendagitis.  CBC was normal.  We discussed the rare nature of this finding and that it is a self-limited problem.  However, it can cause severe pain.  Her symptoms have been present for nearly 1 week and are starting to subside but Tylenol is not completely controlling her pain.  She usually avoids NSAIDs for the reasons below but I think it would be reasonable for her to take ibuprofen 200 mg twice daily with food for the next 3 days.  She can also continue 500 mg of acetaminophen 1-2 tablets every 8 hours as needed.  My expectation is that her pain will subside over the next 7 days    She asked a good question on how we will know whether any future left lower quadrant pain is acute diverticulitis or epiploic appendagitis.  Given the rare nature of epiploic appendagitis, I would assume any future symptoms are most likely acute diverticulitis but ultimately she may need a CT scan to determine.    Hypothyroidism, unspecified type  TSH was 12 and her dose of levothyroxine was increased to 125 mcg last fall.  Most recently TSH down to 0.34.  Concerned that her dose may be too much and I am rechecking her TSH today as it has been 6 weeks.  - TSH; Future    Solitary kidney, acquired  Caution needs to be made with use of NSAIDs but minimal risk if taking a relatively low dose for only the next 3 days    Primary hypertension  Blood pressure looks well-controlled.  She continues on the same dose of lisinopril.  While NSAIDs are not ideal, again minimal risk if using up to 400 mg of ibuprofen for the next 3 days.  I certainly would not continue beyond the next 72 hours.      BMI  Estimated body mass index is 36.76 kg/m  as  "calculated from the following:    Height as of this encounter: 1.575 m (5' 2\").    Weight as of this encounter: 91.2 kg (201 lb).       Follow-up for annual wellness visit in 8 months    Nhung Chau is a 68 year old, presenting for the following health issues: Here to discuss left lower quadrant abdominal pain diagnosed with epiploic appendagitis.  Other medical problems including hypertension and solitary kidney treatment questions.  See assessment and plan for details  Follow Up ( 1/22/24 Epiploic appendagitis)      1/25/2024     1:12 PM   Additional Questions   Roomed by Radha RODRIGUEZ     History of Present Illness       Reason for visit:  Pain in left lower stomach  Symptom onset:  3-7 days ago  Symptoms include:  Pain  Symptom intensity:  Moderate  Symptom progression:  Staying the same  Had these symptoms before:  Yes  Has tried/received treatment for these symptoms:  Yes  Previous treatment was successful:  Yes  Prior treatment description:  Medication    She eats 0-1 servings of fruits and vegetables daily.She consumes 1 sweetened beverage(s) daily.She exercises with enough effort to increase her heart rate 30 to 60 minutes per day.  She exercises with enough effort to increase her heart rate 3 or less days per week.   She is taking medications regularly.           Review of Systems  Constitutional, HEENT, cardiovascular, pulmonary, gi and gu systems are negative, except as otherwise noted.    There has been some associated nausea      Objective    /80 (BP Location: Right arm, Patient Position: Sitting, Cuff Size: Adult Regular)   Pulse 83   Temp 98.4  F (36.9  C) (Oral)   Resp 16   Ht 1.575 m (5' 2\")   Wt 91.2 kg (201 lb)   LMP  (LMP Unknown)   SpO2 96%   Breastfeeding No   BMI 36.76 kg/m    Body mass index is 36.76 kg/m .  Physical Exam   Well-appearing middle-aged woman who does not appear acutely ill  Abdomen is soft but there is tenderness in the left mid and lower abdomen.  No rebound " tenderness.    Signed Electronically by: Ho Quezada MD

## 2024-01-26 LAB — TSH SERPL DL<=0.005 MIU/L-ACNC: 1.03 UIU/ML (ref 0.3–4.2)

## 2024-02-13 ENCOUNTER — HOSPITAL ENCOUNTER (EMERGENCY)
Facility: CLINIC | Age: 69
Discharge: HOME | End: 2024-02-13
Attending: STUDENT IN AN ORGANIZED HEALTH CARE EDUCATION/TRAINING PROGRAM | Admitting: STUDENT IN AN ORGANIZED HEALTH CARE EDUCATION/TRAINING PROGRAM
Payer: COMMERCIAL

## 2024-02-13 VITALS
BODY MASS INDEX: 35.85 KG/M2 | HEART RATE: 70 BPM | SYSTOLIC BLOOD PRESSURE: 132 MMHG | DIASTOLIC BLOOD PRESSURE: 62 MMHG | TEMPERATURE: 97.2 F | RESPIRATION RATE: 14 BRPM | OXYGEN SATURATION: 98 % | WEIGHT: 196 LBS

## 2024-02-13 DIAGNOSIS — R55 PRE-SYNCOPE: ICD-10-CM

## 2024-02-13 LAB
ALBUMIN SERPL BCG-MCNC: 4.2 G/DL (ref 3.5–5.2)
ALP SERPL-CCNC: 114 U/L (ref 40–150)
ALT SERPL W P-5'-P-CCNC: 15 U/L (ref 0–50)
ANION GAP SERPL CALCULATED.3IONS-SCNC: 10 MMOL/L (ref 7–15)
AST SERPL W P-5'-P-CCNC: 19 U/L (ref 0–45)
ATRIAL RATE - MUSE: 65 BPM
BASOPHILS # BLD AUTO: 0.1 10E3/UL (ref 0–0.2)
BASOPHILS NFR BLD AUTO: 1 %
BILIRUB SERPL-MCNC: 0.6 MG/DL
BUN SERPL-MCNC: 12.4 MG/DL (ref 8–23)
CALCIUM SERPL-MCNC: 9.5 MG/DL (ref 8.8–10.2)
CHLORIDE SERPL-SCNC: 109 MMOL/L (ref 98–107)
CREAT SERPL-MCNC: 0.96 MG/DL (ref 0.51–0.95)
DEPRECATED HCO3 PLAS-SCNC: 25 MMOL/L (ref 22–29)
DIASTOLIC BLOOD PRESSURE - MUSE: NORMAL MMHG
EGFRCR SERPLBLD CKD-EPI 2021: 65 ML/MIN/1.73M2
EOSINOPHIL # BLD AUTO: 0.2 10E3/UL (ref 0–0.7)
EOSINOPHIL NFR BLD AUTO: 4 %
ERYTHROCYTE [DISTWIDTH] IN BLOOD BY AUTOMATED COUNT: 12.8 % (ref 10–15)
GLUCOSE SERPL-MCNC: 121 MG/DL (ref 70–99)
HCT VFR BLD AUTO: 39.9 % (ref 35–47)
HGB BLD-MCNC: 13.5 G/DL (ref 11.7–15.7)
IMM GRANULOCYTES # BLD: 0 10E3/UL
IMM GRANULOCYTES NFR BLD: 0 %
INTERPRETATION ECG - MUSE: NORMAL
LYMPHOCYTES # BLD AUTO: 1.1 10E3/UL (ref 0.8–5.3)
LYMPHOCYTES NFR BLD AUTO: 22 %
MAGNESIUM SERPL-MCNC: 2.1 MG/DL (ref 1.7–2.3)
MCH RBC QN AUTO: 29.5 PG (ref 26.5–33)
MCHC RBC AUTO-ENTMCNC: 33.8 G/DL (ref 31.5–36.5)
MCV RBC AUTO: 87 FL (ref 78–100)
MONOCYTES # BLD AUTO: 0.4 10E3/UL (ref 0–1.3)
MONOCYTES NFR BLD AUTO: 7 %
NEUTROPHILS # BLD AUTO: 3.2 10E3/UL (ref 1.6–8.3)
NEUTROPHILS NFR BLD AUTO: 66 %
NRBC # BLD AUTO: 0 10E3/UL
NRBC BLD AUTO-RTO: 0 /100
P AXIS - MUSE: 12 DEGREES
PLATELET # BLD AUTO: 243 10E3/UL (ref 150–450)
POTASSIUM SERPL-SCNC: 4.3 MMOL/L (ref 3.4–5.3)
PR INTERVAL - MUSE: 186 MS
PROT SERPL-MCNC: 6.8 G/DL (ref 6.4–8.3)
QRS DURATION - MUSE: 78 MS
QT - MUSE: 424 MS
QTC - MUSE: 440 MS
R AXIS - MUSE: -3 DEGREES
RBC # BLD AUTO: 4.57 10E6/UL (ref 3.8–5.2)
SODIUM SERPL-SCNC: 144 MMOL/L (ref 135–145)
SYSTOLIC BLOOD PRESSURE - MUSE: NORMAL MMHG
T AXIS - MUSE: 31 DEGREES
TROPONIN T SERPL HS-MCNC: 7 NG/L
TROPONIN T SERPL HS-MCNC: 7 NG/L
VENTRICULAR RATE- MUSE: 65 BPM
WBC # BLD AUTO: 5 10E3/UL (ref 4–11)

## 2024-02-13 PROCEDURE — 93005 ELECTROCARDIOGRAM TRACING: CPT | Performed by: STUDENT IN AN ORGANIZED HEALTH CARE EDUCATION/TRAINING PROGRAM

## 2024-02-13 PROCEDURE — 99284 EMERGENCY DEPT VISIT MOD MDM: CPT | Mod: 25

## 2024-02-13 PROCEDURE — 96360 HYDRATION IV INFUSION INIT: CPT

## 2024-02-13 PROCEDURE — 84484 ASSAY OF TROPONIN QUANT: CPT | Performed by: STUDENT IN AN ORGANIZED HEALTH CARE EDUCATION/TRAINING PROGRAM

## 2024-02-13 PROCEDURE — 85025 COMPLETE CBC W/AUTO DIFF WBC: CPT | Performed by: STUDENT IN AN ORGANIZED HEALTH CARE EDUCATION/TRAINING PROGRAM

## 2024-02-13 PROCEDURE — 82040 ASSAY OF SERUM ALBUMIN: CPT | Performed by: STUDENT IN AN ORGANIZED HEALTH CARE EDUCATION/TRAINING PROGRAM

## 2024-02-13 PROCEDURE — 96361 HYDRATE IV INFUSION ADD-ON: CPT

## 2024-02-13 PROCEDURE — 83735 ASSAY OF MAGNESIUM: CPT | Performed by: STUDENT IN AN ORGANIZED HEALTH CARE EDUCATION/TRAINING PROGRAM

## 2024-02-13 PROCEDURE — 258N000003 HC RX IP 258 OP 636: Performed by: STUDENT IN AN ORGANIZED HEALTH CARE EDUCATION/TRAINING PROGRAM

## 2024-02-13 PROCEDURE — 36415 COLL VENOUS BLD VENIPUNCTURE: CPT | Performed by: STUDENT IN AN ORGANIZED HEALTH CARE EDUCATION/TRAINING PROGRAM

## 2024-02-13 RX ADMIN — SODIUM CHLORIDE, POTASSIUM CHLORIDE, SODIUM LACTATE AND CALCIUM CHLORIDE 1000 ML: 600; 310; 30; 20 INJECTION, SOLUTION INTRAVENOUS at 10:26

## 2024-02-13 NOTE — DISCHARGE INSTRUCTIONS
Please call your primary care doctor today for follow-up in the next week or so.  Make sure you are pushing oral hydration.  Continue to take your home medications.    If you are going from a sitting to standing position or changing positions, please do it slowly.  If you faint or almost fainted again, please return to the ER for repeat evaluation.    Your workup today was normal with no signs of heart attack, kidney damage, electrolyte abnormalities, or any other concerning symptoms

## 2024-02-13 NOTE — ED PROVIDER NOTES
"  Emergency Department Encounter         FINAL IMPRESSION:  Presyncope      ED COURSE AND MEDICAL DECISION MAKING       ED Course as of 02/13/24 1303   Tue Feb 13, 2024   1008 I met with the patient to gather history and to perform my initial exam. We discussed plans for the ED course, including diagnostic testing and treatment.      1016 Patient is an obese 68-year-old history of hypertension hyperlipidemia, here after sudden onset of presyncope.  Patient states she was making the bed this morning when she felt suddenly lightheaded like she was in a pass out.  States that she denied any vertiginous or imbalance issues during this event.  States that she did not vomit or have any chest pain or trouble breathing.  Has been feeling well this week otherwise.  No abdominal pain.  No dysuria.  No bowel movement changes.  No cough or congestion.  No ear ringing.  No dysarthria.  States that her face felt \"kind of tingly\" however no persisting neurologic complaints.  Call the triage for neuroevaluation.  Her NIH is 0.  No dysmetria.  No abdominal pain.  Although patient is describing presyncope as her type of dizziness, her presentation with her bending over making the bed and then standing up quickly and feeling lightheaded does suggest a potential BPPV type pathology.  With symptoms improving, unlikely posterior stroke pathology.  No neck pain suggesting dissection.  Plan for basic labs EKG and reevaluate.   1123 EKG is sinus 65 no signs acute ischemia, no inversions no depressions no STEMI QTc 440   1302 I rechecked and updated the patient with results. Discussed plan of discharge, patient agreeable.      -Labs reassuring.  Patient states she feels improved and back to her baseline.  Unsure what caused her symptoms.  Has no other symptomatology that has been progressive last few week suggesting any other morbidity or mortality causing process.  Has no murmur heard on examination.  She looks well.  Ambulatory out " difficulty.  Plan for outpatient follow-up.      Medical Decision Making  Obtained supplemental history:Supplemental history obtained?: No  Reviewed external records: External records reviewed?: Documented in chart  Care impacted by chronic illness:Hyperlipidemia and Hypertension  Care significantly affected by social determinants of health:N/A  Did you consider but not order tests?: Work up considered but not performed and documented in chart, if applicable  Did you interpret images independently?: Independent interpretation of ECG and images noted in documentation, when applicable.  Consultation discussion with other provider:Did you involve another provider (consultant, , pharmacy, etc.)?: No  Discharge. No recommendations on prescription strength medication(s). See documentation for any additional details.      EKG  EKG is sinus 65 no signs acute ischemia, no inversions no depressions no STEMI QTc 440      Critical Care     Performed by: Eric Reyes or    Authorized by: Eric Reyes  Total critical care time:  minutes  Critical care was necessary to treat or prevent imminent or life-threatening deterioration of the following conditions:   Critical care was time spent personally by me on the following activities: development of treatment plan with patient or surrogate, discussions with consultants, examination of patient, evaluation of patient's response to treatment, obtaining history from patient or surrogate, ordering and performing treatments and interventions, ordering and review of laboratory studies, ordering and review of radiographic studies, re-evaluation of patient's condition and monitoring for potential decompensation.  Critical care time was exclusive of separately billable procedures and treating other patients.'    At the conclusion of the encounter I discussed the results of all the tests and the disposition. The questions were answered. The patient or family acknowledged understanding and was  "agreeable with the care plan.        MEDICATIONS GIVEN IN THE EMERGENCY DEPARTMENT:  Medications   lactated ringers BOLUS 1,000 mL (0 mLs Intravenous Stopped 2/13/24 1224)       NEW PRESCRIPTIONS STARTED AT TODAY'S ED VISIT:  New Prescriptions    No medications on file       HPI     Patient information obtained from: Patient    Use of : N/A     Kandi Kim is a 68 year old female with a pertinent history of diverticulitis, GERD, HONEY, hyperlipidemia, hypertension, who presents to this ED via private car a for evaluation of pre syncopal sensation.     Patient reports she was making her bed this morning around 9:00AM when she developed a sudden onset of lightheadedness, blurred vision, and presyncopal sensation. Patient continued falling forward, attempting to grab onto things, until she landed in the closet. Denies hitting head or injury. She took her blood pressure twice which read 150s/180s. Currently endorses feeling \"off\".     Denies speech issues, vomiting, diarrhea, chest pain shortness of breath, abdominal pain, diaphoresis, tinnitus.         MEDICAL HISTORY     Past Medical History:   Diagnosis Date    Acute torn meniscus of knee, left, initial encounter 02/08/2018    Acute torn meniscus of knee, left, sequela 05/14/2019    Balance problems 12/01/2023    Bilateral hearing loss 03/26/2021    Chest pain 03/14/2022    Chronic left shoulder pain 05/03/2021    Chronic sinusitis     Class 2 severe obesity with serious comorbidity and body mass index (BMI) of 37.0 to 37.9 in adult (H) 07/21/2022    Diverticulitis 08/09/2016    Epiploic appendagitis 01/25/2024    GERD with esophagitis     History of colon polyps     HTN (hypertension)     Hyperlipidemia     Hypothyroidism     Impingement syndrome of right shoulder 06/22/2023    Influenza A 03/14/2022    Migraines     Open-angle glaucoma of both eyes 07/21/2022    HONEY (obstructive sleep apnea)     Osteoarthritis     PONV (postoperative nausea and " vomiting)     Postmenopausal symptoms     Primary osteoarthritis involving multiple joints 07/21/2022    Primary osteoarthritis of both knees 03/26/2021    Primary osteoarthritis of right knee 04/12/2016    Rectal bleeding 08/09/2016    Rectocele 11/11/2016    Screening     Situational anxiety     Status post total bilateral knee replacement 03/14/2022    Tear of right rotator cuff 06/22/2023       Past Surgical History:   Procedure Laterality Date    ARTHROPLASTY KNEE Right 04/01/2016    ARTHROPLASTY KNEE Left     jan 2020    ARTHROSCOPY KNEE Bilateral 2011 2014    BUNIONECTOMY      COLPORRHAPHY N/A 02/21/2017    Procedure: POSTERIOR COLPORRHAPHY;  Surgeon: Roxana Frias MD;  Location: [facility] OR;  Service:     HYSTERECTOMY VAGINAL N/A 02/21/2017    Procedure: TOTAL VAGINAL HYSTERECTOMY, BILATERAL SALPINGO-OOPHORECTOMY;  Surgeon: Roxana Frias MD;  Location: [facility] OR;  Service:     MIDDLE EAR SURGERY      NEPHRECTOMY Left     Kidney donor    OOPHORECTOMY      SHOULDER ARTHROSCOPY W/ ROTATOR CUFF REPAIR Right     TUBAL LIGATION         Social History     Tobacco Use    Smoking status: Never     Passive exposure: Never    Smokeless tobacco: Never   Vaping Use    Vaping Use: Never used   Substance Use Topics    Alcohol use: Yes     Comment: Alcoholic Drinks/day: infrequent    Drug use: No       amoxicillin (AMOXIL) 500 MG capsule  atorvastatin (LIPITOR) 20 MG tablet  citalopram (CELEXA) 20 MG tablet  latanoprost (XALATAN) 0.005 % ophthalmic solution  levothyroxine (SYNTHROID/LEVOTHROID) 125 MCG tablet  lisinopril (ZESTRIL) 10 MG tablet  omeprazole (PRILOSEC) 20 MG DR capsule  Vitamin D3 (CHOLECALCIFEROL) 125 MCG (5000 UT) tablet            PHYSICAL EXAM     /62   Pulse 70   Temp 97.2  F (36.2  C) (Temporal)   Resp 14   Wt 88.9 kg (196 lb)   LMP  (LMP Unknown)   SpO2 98%   BMI 35.85 kg/m        PHYSICAL EXAM:     General: Patient appears well, nontoxic, comfortable  HEENT: Moist  mucous membranes,  No head trauma.    Cardiovascular: Normal rate, normal rhythm, no extremity edema.  No appreciable murmur.  Respiratory: No signs of respiratory distress, lungs are clear to auscultation bilaterally with no wheezes rhonchi or rales.  Abdominal: Soft, nontender, nondistended, no palpable masses, no guarding, no rebound  Musculoskeletal: Full range of motion of joints, no deformities appreciated.  Neurological: Alert and oriented, grossly neurologically intact.  Psychological: Normal affect and mood.  Integument: No rashes appreciated    [employer] Stroke Scale  Exam Interval: 1.5 hours since symptom onset   Score    Level of consciousness: (0)   Alert, keenly responsive    LOC questions: (0)   Answers both questions correctly    LOC commands: (0)   Performs both tasks correctly    Best gaze: (0)   Normal    Visual: (0)   No visual loss    Facial palsy: (0)   Normal symmetrical movements    Motor arm (left): (0)   No drift    Motor arm (right): (0)   No drift    Motor leg (left): (0)   No drift    Motor leg (right): (0)   No drift    Limb ataxia: (0)   Absent    Sensory: (0)   Normal- no sensory loss    Best language: (0)   Normal- no aphasia    Dysarthria: (0)   Normal    Extinction and inattention: (0)   No abnormality        Total Score:  0       RESULTS       Labs Ordered and Resulted from Time of ED Arrival to Time of ED Departure   COMPREHENSIVE METABOLIC PANEL - Abnormal       Result Value    Sodium 144      Potassium 4.3      Carbon Dioxide (CO2) 25      Anion Gap 10      Urea Nitrogen 12.4      Creatinine 0.96 (*)     GFR Estimate 65      Calcium 9.5      Chloride 109 (*)     Glucose 121 (*)     Alkaline Phosphatase 114      AST 19      ALT 15      Protein Total 6.8      Albumin 4.2      Bilirubin Total 0.6     TROPONIN T, HIGH SENSITIVITY - Normal    Troponin T, High Sensitivity 7     MAGNESIUM - Normal    Magnesium 2.1     TROPONIN T, HIGH SENSITIVITY - Normal     Troponin T, High Sensitivity 7     CBC WITH PLATELETS AND DIFFERENTIAL    WBC Count 5.0      RBC Count 4.57      Hemoglobin 13.5      Hematocrit 39.9      MCV 87      MCH 29.5      MCHC 33.8      RDW 12.8      Platelet Count 243      % Neutrophils 66      % Lymphocytes 22      % Monocytes 7      % Eosinophils 4      % Basophils 1      % Immature Granulocytes 0      NRBCs per 100 WBC 0      Absolute Neutrophils 3.2      Absolute Lymphocytes 1.1      Absolute Monocytes 0.4      Absolute Eosinophils 0.2      Absolute Basophils 0.1      Absolute Immature Granulocytes 0.0      Absolute NRBCs 0.0         No orders to display         PROCEDURES:  Procedures:  Procedures       I, Lanny Méndez am serving as a scribe to document services personally performed by Eric Reyes DO, based on my observations and the provider's statements to me.  I, Eric Reyes DO, attest that Lanny Méndez is acting in a scribe capacity, has observed my performance of the services and has documented them in accordance with my direction.    Eric Reeys DO  Emergency Medicine  [facility] EMERGENCY ROOM       Ohl, Eric Dominique DO  02/13/24 7230

## 2024-02-13 NOTE — ED TRIAGE NOTES
"Pt states was making bed this morning and felt lightheaded and like was going to pass out, sat herself down, denies syncope. Pt states took her BP and was very high. Pt takes BP meds and took those this morning. Pt states still feeling dizzy/lightheaded, \"not totally with it.\" Headache earlier denies now.   Sx started at 9am/   pt also co tingling to cheeks. MD called to triage to evaluate.     "

## 2024-02-15 ENCOUNTER — PATIENT OUTREACH (OUTPATIENT)
Dept: CARE COORDINATION | Facility: CLINIC | Age: 69
End: 2024-02-15
Payer: COMMERCIAL

## 2024-02-15 NOTE — PROGRESS NOTES
[facility] Care Resource Center Contact  [facility]/Voicemail     Clinical Data: Care Coordination ED-sourced Outreach-     Outreach attempted x 2.  Left message on patient's voicemail, providing [facility]'s 24/7 scheduling and nurse triage phone number [phone] ([phone]) for questions/concerns and/or to schedule an appt with an [facility] provider.      Care Coordination introduction letter with explanation of Clinic Care Coordination services sent to patient via [employer]. Clinic Care Coordination services remain available via referral if needed.    Plan: [facility] will do no further outreaches at this time.       MATEUSZ Hamilton  Connected Care Resource [place], [facility]    *Connected Care Resource Team does NOT follow patient ongoing. Referrals are identified based on internal discharge reports and the outreach is to ensure patient has an understanding of their discharge instructions.

## 2024-02-26 ENCOUNTER — TRANSFERRED RECORDS (OUTPATIENT)
Dept: HEALTH INFORMATION MANAGEMENT | Facility: CLINIC | Age: 69
End: 2024-02-26
Payer: COMMERCIAL

## 2024-02-29 DIAGNOSIS — I10 ESSENTIAL HYPERTENSION: ICD-10-CM

## 2024-02-29 RX ORDER — LISINOPRIL 10 MG/1
TABLET ORAL
Qty: 90 TABLET | Refills: 3 | Status: SHIPPED | OUTPATIENT
Start: 2024-02-29

## 2024-03-07 ENCOUNTER — TRANSFERRED RECORDS (OUTPATIENT)
Dept: HEALTH INFORMATION MANAGEMENT | Facility: CLINIC | Age: 69
End: 2024-03-07
Payer: COMMERCIAL

## 2024-03-26 ENCOUNTER — TRANSFERRED RECORDS (OUTPATIENT)
Dept: HEALTH INFORMATION MANAGEMENT | Facility: CLINIC | Age: 69
End: 2024-03-26
Payer: COMMERCIAL

## 2024-04-11 DIAGNOSIS — F32.A DEPRESSION, UNSPECIFIED DEPRESSION TYPE: ICD-10-CM

## 2024-04-11 RX ORDER — CITALOPRAM HYDROBROMIDE 20 MG/1
TABLET ORAL
Qty: 90 TABLET | Refills: 0 | Status: SHIPPED | OUTPATIENT
Start: 2024-04-11 | End: 2024-07-07

## 2024-04-22 ENCOUNTER — OFFICE VISIT (OUTPATIENT)
Dept: INTERNAL MEDICINE | Facility: CLINIC | Age: 69
End: 2024-04-22
Payer: COMMERCIAL

## 2024-04-22 VITALS
RESPIRATION RATE: 18 BRPM | DIASTOLIC BLOOD PRESSURE: 70 MMHG | HEIGHT: 62 IN | HEART RATE: 62 BPM | SYSTOLIC BLOOD PRESSURE: 128 MMHG | WEIGHT: 199 LBS | OXYGEN SATURATION: 96 % | TEMPERATURE: 98.1 F | BODY MASS INDEX: 36.62 KG/M2

## 2024-04-22 DIAGNOSIS — I10 PRIMARY HYPERTENSION: ICD-10-CM

## 2024-04-22 DIAGNOSIS — Z01.818 PREOP GENERAL PHYSICAL EXAM: Primary | ICD-10-CM

## 2024-04-22 DIAGNOSIS — G47.33 OSA (OBSTRUCTIVE SLEEP APNEA): ICD-10-CM

## 2024-04-22 DIAGNOSIS — G89.29 CHRONIC FOOT PAIN, LEFT: ICD-10-CM

## 2024-04-22 DIAGNOSIS — M79.672 CHRONIC FOOT PAIN, LEFT: ICD-10-CM

## 2024-04-22 DIAGNOSIS — E03.9 HYPOTHYROIDISM, UNSPECIFIED TYPE: ICD-10-CM

## 2024-04-22 DIAGNOSIS — K21.00 GASTROESOPHAGEAL REFLUX DISEASE WITH ESOPHAGITIS WITHOUT HEMORRHAGE: ICD-10-CM

## 2024-04-22 LAB
ERYTHROCYTE [DISTWIDTH] IN BLOOD BY AUTOMATED COUNT: 11.9 % (ref 10–15)
HCT VFR BLD AUTO: 39.4 % (ref 35–47)
HGB BLD-MCNC: 13.4 G/DL (ref 11.7–15.7)
MCH RBC QN AUTO: 29.8 PG (ref 26.5–33)
MCHC RBC AUTO-ENTMCNC: 34 G/DL (ref 31.5–36.5)
MCV RBC AUTO: 88 FL (ref 78–100)
PLATELET # BLD AUTO: 254 10E3/UL (ref 150–450)
RBC # BLD AUTO: 4.5 10E6/UL (ref 3.8–5.2)
WBC # BLD AUTO: 6 10E3/UL (ref 4–11)

## 2024-04-22 PROCEDURE — 36415 COLL VENOUS BLD VENIPUNCTURE: CPT | Performed by: INTERNAL MEDICINE

## 2024-04-22 PROCEDURE — 80048 BASIC METABOLIC PNL TOTAL CA: CPT | Performed by: INTERNAL MEDICINE

## 2024-04-22 PROCEDURE — 85027 COMPLETE CBC AUTOMATED: CPT | Performed by: INTERNAL MEDICINE

## 2024-04-22 PROCEDURE — G2211 COMPLEX E/M VISIT ADD ON: HCPCS | Performed by: INTERNAL MEDICINE

## 2024-04-22 PROCEDURE — 99214 OFFICE O/P EST MOD 30 MIN: CPT | Performed by: INTERNAL MEDICINE

## 2024-04-22 RX ORDER — RESPIRATORY SYNCYTIAL VIRUS VACCINE 120MCG/0.5
0.5 KIT INTRAMUSCULAR ONCE
Qty: 1 EACH | Refills: 0 | Status: CANCELLED | OUTPATIENT
Start: 2024-04-22 | End: 2024-04-22

## 2024-04-22 ASSESSMENT — PATIENT HEALTH QUESTIONNAIRE - PHQ9
10. IF YOU CHECKED OFF ANY PROBLEMS, HOW DIFFICULT HAVE THESE PROBLEMS MADE IT FOR YOU TO DO YOUR WORK, TAKE CARE OF THINGS AT HOME, OR GET ALONG WITH OTHER PEOPLE: NOT DIFFICULT AT ALL
SUM OF ALL RESPONSES TO PHQ QUESTIONS 1-9: 2
SUM OF ALL RESPONSES TO PHQ QUESTIONS 1-9: 2

## 2024-04-22 NOTE — PROGRESS NOTES
Preoperative Evaluation  [facility]  [address] [facility] [address] [address]  Phone: [phone]  Fax: [phone]  Primary Provider: Ho Quezada  Pre-op Performing Provider: HO QUEZADA  Apr 22, 2024       Kandi is a 68 year old, presenting for the following:  Pre-Op Exam        4/22/2024     2:21 PM   Additional Questions   Roomed by      Surgical Information  Surgery/Procedure:   LEFT TALONAVICULAR FUSION Left General   AND LEFT GASTROC SLIDE Left General   AND LEFT BUNION REPAIR Left General           Surgery Location:  OR  Surgeon: Dr. Tyler  Surgery Date: 5/20/24  Time of Surgery:   Where patient plans to recover: At home with family  Fax number for surgical facility: Note does not need to be faxed, will be available electronically in Epic.    Assessment & Plan     The proposed surgical procedure is considered INTERMEDIATE risk.    Preop general physical exam  68-year-old woman here for preop clearance prior to upcoming left foot surgery for chronic pain.  She has experienced nausea associated with general anesthesia and premedication should be considered.  Otherwise she has tolerated previous surgery and anesthesia without any other complications.  She will avoid aspirin and NSAIDs during the week prior to surgery.  Overall her risk is low and she is cleared to proceed with surgery and anesthesia as planned.  - CBC with platelets; Future  - Basic metabolic panel  (Ca, Cl, CO2, Creat, Gluc, K, Na, BUN); Future    Chronic foot pain, left  Plans for surgery on left foot    PONV  Preop medication should be considered    Primary hypertension  Blood pressure very well-controlled.  She will hold lisinopril on the morning of surgery to avoid any perioperative hypotension  - CBC with platelets; Future  - Basic metabolic panel  (Ca, Cl, CO2, Creat, Gluc, K, Na, BUN); Future    HONEY (obstructive sleep apnea)  She uses an appliance at night and will bring that  with her to the surgery facility    Hypothyroidism, unspecified type  She is instructed to take her usual dose of levothyroxine on the morning of surgery with a small sip of water    Gastroesophageal reflux disease with esophagitis without hemorrhage  She is instructed to take her usual dose of omeprazole on the morning of surgery with a small sip of water       The longitudinal plan of care for the diagnosis(es)/condition(s) as documented were addressed during this visit. Due to the added complexity in care, I will continue to support Kandi in the subsequent management and with ongoing continuity of care.         Antiplatelet or Anticoagulation Medication Instructions   - Patient is on no antiplatelet or anticoagulation medications.    Additional Medication Instructions  Avoid aspirin and over-the-counter NSAIDs including ibuprofen, Motrin, Advil and Aleve for 1 week prior to your surgery    You may take over-the-counter Tylenol/acetaminophen if you need something for pain    On the morning of your surgery, take your usual doses of omeprazole, citalopram, and levothyroxine with a small sip of water.  Hold all of your other medications including lisinopril    Recommendation  APPROVAL GIVEN to proceed with proposed procedure, without further diagnostic evaluation.      30 minutes spent by me on the date of the encounter doing chart review, history and exam, documentation and further activities per the note    Subjective       HPI related to upcoming procedure: 68-year-old woman here for preop clearance prior to upcoming foot surgery.  See assessment and plan for details        4/15/2024     1:25 PM   Preop Questions   1. Have you ever had a heart attack or stroke? No   2. Have you ever had surgery on your heart or blood vessels, such as a stent placement, a coronary artery bypass, or surgery on an artery in your head, neck, heart, or legs? No   3. Do you have chest pain with activity? No   4. Do you have a history of   heart failure? No   5. Do you currently have a cold, bronchitis or symptoms of other infection? No   6. Do you have a cough, shortness of breath, or wheezing? No   7. Do you or anyone in your family have previous history of blood clots? No   8. Do you or does anyone in your family have a serious bleeding problem such as prolonged bleeding following surgeries or cuts? No   9. Have you ever had problems with anemia or been told to take iron pills? No   10. Have you had any abnormal blood loss such as black, tarry or bloody stools, or abnormal vaginal bleeding? No   11. Have you ever had a blood transfusion? No   12. Are you willing to have a blood transfusion if it is medically needed before, during, or after your surgery? Yes   13. Have you or any of your relatives ever had problems with anesthesia? YES -she has had nausea with anesthesia   14. Do you have sleep apnea, excessive snoring or daytime drowsiness? YES -sleep apnea using appliance   14a. Do you have a CPAP machine? No   15. Do you have any artifical heart valves or other implanted medical devices like a pacemaker, defibrillator, or continuous glucose monitor? No   16. Do you have artificial joints? YES -bilateral knee replacements   17. Are you allergic to latex? No       Health Care Directive  Patient does not have a Health Care Directive or Living Will:     Preoperative Review of    reviewed - no record of controlled substances prescribed.          Patient Active Problem List    Diagnosis Date Noted    Epiploic appendagitis 01/25/2024     Priority: Medium    Balance problems 12/01/2023     Priority: Medium    Epistaxis 08/28/2023     Priority: Medium    Primary osteoarthritis involving multiple joints 07/21/2022     Priority: Medium     Bilateral TKA and chronic pain involving left ankle and right shoulder      Open-angle glaucoma of both eyes 07/21/2022     Priority: Medium    Class 2 severe obesity with serious comorbidity and body mass index  (BMI) of 37.0 to 37.9 in adult (H) 07/21/2022     Priority: Medium    Status post total bilateral knee replacement 03/14/2022     Priority: Medium    History of colon polyps 03/11/2022     Priority: Medium     Colonoscopy December 2018 with multiple polyps.  Colonoscopy March 2022 with one adenomatous polyp and several hyperplastic polyps      Bilateral hearing loss 03/26/2021     Priority: Medium     She has hearing aids        GERD with esophagitis      Priority: Medium     Complaints of dysphagia with EGD showing esophagitis.  Negative for   eosinophilic esophagitis September 2019        Solitary kidney, acquired 04/26/2016     Priority: Medium     Formatting of this note might be different from the original.  Solitary kidney, acquired - transplant donor        HTN (hypertension)      Priority: Medium    Hypothyroidism      Priority: Medium    Hyperlipidemia      Priority: Medium    HONEY (obstructive sleep apnea)      Priority: Medium    Postmenopausal symptoms      Priority: Medium    Chronic sinusitis      Priority: Medium      Past Medical History:   Diagnosis Date    Acute torn meniscus of knee, left, initial encounter 02/08/2018    Acute torn meniscus of knee, left, sequela 05/14/2019    Balance problems 12/01/2023    Bilateral hearing loss 03/26/2021    Hearing aids    Chest pain 03/14/2022    Chronic left shoulder pain 05/03/2021    Chronic sinusitis     Class 2 severe obesity with serious comorbidity and body mass index (BMI) of 37.0 to 37.9 in adult (H) 07/21/2022    Diverticulitis 08/09/2016    CT scan normal    Epiploic appendagitis 01/25/2024    GERD with esophagitis     Complaints of dysphagia with EGD showing esophagitis.  Negative for eosinophilic esophagitis September 2019    History of colon polyps     Colonoscopy December 2018 with multiple polyps.  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  Added automatically from request for surgery [id number]    Influenza A 03/14/2022    Migraines     Open-angle glaucoma of both eyes 07/21/2022    HONEY (obstructive sleep apnea)     Osteoarthritis     knees, hands    PONV (postoperative nausea and vomiting)     Postmenopausal symptoms     Primary osteoarthritis involving multiple joints 07/21/2022    Bilateral TKA and chronic pain involving left ankle and right shoulder    Primary osteoarthritis of both knees 03/26/2021    Bilateral TKA    Primary osteoarthritis of right knee 04/12/2016    R TKA    Rectal bleeding 08/09/2016    Rectocele 11/11/2016    Surgery is planned    Screening     DEXA normal 2016 and normal 2022    Situational anxiety     Status post total bilateral knee replacement 03/14/2022    Tear of right rotator cuff 06/22/2023    Shoulder surg 2023     Past Surgical History:   Procedure Laterality Date    ARTHROPLASTY KNEE Right 04/01/2016    ARTHROPLASTY KNEE Left     jan 2020    ARTHROSCOPY KNEE Bilateral 2011 2014    BUNIONECTOMY      COLPORRHAPHY N/A 02/21/2017    Procedure: POSTERIOR COLPORRHAPHY;  Surgeon: Roxana Frias MD;  Location: [facility];  Service:     HYSTERECTOMY VAGINAL N/A 02/21/2017    Procedure: TOTAL VAGINAL HYSTERECTOMY, BILATERAL SALPINGO-OOPHORECTOMY;  Surgeon: Roxana Frias MD;  Location: [facility];  Service:     MIDDLE EAR SURGERY      NEPHRECTOMY Left     Kidney donor    OOPHORECTOMY      SHOULDER ARTHROSCOPY W/ ROTATOR CUFF REPAIR Right     TUBAL LIGATION       Current Outpatient Medications   Medication Sig Dispense Refill    atorvastatin (LIPITOR) 20 MG tablet TAKE 1 TABLET BY MOUTH EVERY DAY 90 tablet 3    citalopram (CELEXA) 20 MG tablet TAKE 1 TABLET BY MOUTH EVERY DAY 90 tablet 0    latanoprost (XALATAN) 0.005 % ophthalmic solution Place 1  "drop into both eyes every evening      levothyroxine (SYNTHROID/LEVOTHROID) 125 MCG tablet Take 1 tablet (125 mcg) by mouth daily 90 tablet 3    lisinopril (ZESTRIL) 10 MG tablet TAKE 1 TABLET BY MOUTH EVERY DAY 90 tablet 3    omeprazole (PRILOSEC) 20 MG DR capsule TAKE 1 CAPSULE BY MOUTH EVERY DAY 90 capsule 3    amoxicillin (AMOXIL) 500 MG capsule TAKE 4 CAPSULES (2,000 MG) BY MOUTH ONCE FOR 1 DOSE PRIOR TO DENTIST VISITS 4 capsule 3    Vitamin D3 (CHOLECALCIFEROL) 125 MCG (5000 UT) tablet Take 1 tablet by mouth daily         Allergies   Allergen Reactions    Synvisc [Hylan G-F 20]         Social History     Tobacco Use    Smoking status: Never     Passive exposure: Never    Smokeless tobacco: Never   Substance Use Topics    Alcohol use: Yes     Comment: Alcoholic Drinks/day: infrequent     Family History   Problem Relation Age of Onset    Cerebrovascular Disease Mother     Breast Cancer Sister 62.00    Breast Cancer Cousin         age in 50's     History   Drug Use No         Review of Systems    Review of Systems  Constitutional, HEENT, cardiovascular, pulmonary, GI, , musculoskeletal, neuro, skin, endocrine and psych systems are negative, except as otherwise noted.    Objective    /56 (BP Location: Right arm, Patient Position: Sitting, Cuff Size: Adult Regular)   Pulse 62   Temp 98.1  F (36.7  C) (Oral)   Resp 18   Ht 1.575 m (5' 2\")   Wt 90.3 kg (199 lb)   LMP  (LMP Unknown)   SpO2 96%   Breastfeeding No   BMI 36.40 kg/m     Estimated body mass index is 36.4 kg/m  as calculated from the following:    Height as of this encounter: 1.575 m (5' 2\").    Weight as of this encounter: 90.3 kg (199 lb).  Physical Exam  GENERAL: Well-appearing middle-aged woman alert and no distress  HEENT: Normal  NECK: no adenopathy, no asymmetry, masses, or scars  RESP: lungs clear to auscultation - no rales, rhonchi or wheezes  CV: regular rate and rhythm, normal S1 S2, no S3 or S4, no murmur, click or rub, no " peripheral edema.  No carotid bruits  ABDOMEN: soft, nontender, no hepatosplenomegaly, no masses and bowel sounds normal  NEURO:normal      Diagnostics  LABS-  Hemoglobin 13.4 platelet 254  Potassium 4.1 creatinine 1.15     EKG from February 2024 reviewed.  Normal sinus rhythm with no significant ST or T wave abnormality    Revised Cardiac Risk Index (RCRI)  The patient has the following serious cardiovascular risks for perioperative complications:   - No serious cardiac risks = 0 points     RCRI Interpretation: 0 points: Class I (very low risk - 0.4% complication rate)         Signed Electronically by: Ho Quezada MD  Copy of this evaluation report is provided to requesting physician.         Answers submitted by the patient for this visit:  Patient Health Questionnaire (Submitted on 4/22/2024)  If you checked off any problems, how difficult have these problems made it for you to do your work, take care of things at home, or get along with other people?: Not difficult at all  PHQ9 TOTAL SCORE: 2

## 2024-04-22 NOTE — PATIENT INSTRUCTIONS
Preparing for Your Surgery  Getting started  A nurse will call you to review your health history and instructions. They will give you an arrival time based on your scheduled surgery time. Please be ready to share:  Your doctor's clinic name and phone number  Your medical, surgical, and anesthesia history  A list of allergies and sensitivities  A list of medicines, including herbal treatments and over-the-counter drugs  Whether the patient has a legal guardian (ask how to send us the papers in advance)  Please tell us if you're pregnant--or if there's any chance you might be pregnant. Some surgeries may injure a fetus (unborn baby), so they require a pregnancy test. Surgeries that are safe for a fetus don't always need a test, and you can choose whether to have one.   If you have a child who's having surgery, please ask for a copy of Preparing for Your Child's Surgery.    Preparing for surgery  Within 10 to 30 days of surgery: Have a pre-op exam (sometimes called an H&P, or History and Physical). This can be done at a clinic or pre-operative center.  If you're having a , you may not need this exam. Talk to your care team.  At your pre-op exam, talk to your care team about all medicines you take. If you need to stop any medicines before surgery, ask when to start taking them again.  We do this for your safety. Many medicines can make you bleed too much during surgery. Some change how well surgery (anesthesia) drugs work.  Call your insurance company to let them know you're having surgery. (If you don't have insurance, call [phone].)  Call your clinic if there's any change in your health. This includes signs of a cold or flu (sore throat, runny nose, cough, rash, fever). It also includes a scrape or scratch near the surgery site.  If you have questions on the day of surgery, call your hospital or surgery center.  Eating and drinking guidelines  For your safety: Unless your surgeon tells you otherwise,  follow the guidelines below.  Eat and drink as usual until 8 hours before you arrive for surgery. After that, no food or milk.  Drink clear liquids until 2 hours before you arrive. These are liquids you can see through, like water, Gatorade, and Propel Water. They also include plain black coffee and tea (no cream or milk), candy, and breath mints. You can spit out gum when you arrive.  If you drink alcohol: Stop drinking it the night before surgery.  If your care team tells you to take medicine on the morning of surgery, it's okay to take it with a sip of water.  Preventing infection  Shower or bathe the night before and morning of your surgery. Follow the instructions your clinic gave you. (If no instructions, use regular soap.)  Don't shave or clip hair near your surgery site. We'll remove the hair if needed.  Don't smoke or vape the morning of surgery. You may chew nicotine gum up to 2 hours before surgery. A nicotine patch is okay.  Note: Some surgeries require you to completely quit smoking and nicotine. Check with your surgeon.  Your care team will make every effort to keep you safe from infection. We will:  Clean our hands often with soap and water (or an alcohol-based hand rub).  Clean the skin at your surgery site with a special soap that kills germs.  Give you a special gown to keep you warm. (Cold raises the risk of infection.)  Wear special hair covers, masks, gowns and gloves during surgery.  Give antibiotic medicine, if prescribed. Not all surgeries need antibiotics.  What to bring on the day of surgery  Photo ID and insurance card  Copy of your health care directive, if you have one  Glasses and hearing aids (bring cases)  You can't wear contacts during surgery  Inhaler and eye drops, if you use them (tell us about these when you arrive)  CPAP machine or breathing device, if you use them  A few personal items, if spending the night  If you have . . .  A pacemaker, ICD (cardiac defibrillator) or other  implant: Bring the ID card.  An implanted stimulator: Bring the remote control.  A legal guardian: Bring a copy of the certified (court-stamped) guardianship papers.  Please remove any jewelry, including body piercings. Leave jewelry and other valuables at home.  If you're going home the day of surgery  You must have a responsible adult drive you home. They should stay with you overnight as well.  If you don't have someone to stay with you, and you aren't safe to go home alone, we may keep you overnight. Insurance often won't pay for this.  After surgery  If it's hard to control your pain or you need more pain medicine, please call your surgeon's office.  Questions?   If you have any questions for your care team, list them here: _________________________________________________________________________________________________________________________________________________________________________ ____________________________________ ____________________________________ ____________________________________  For informational purposes only. Not to replace the advice of your health care provider. Copyright   2003, 2019 [employer]. All rights reserved. Clinically reviewed by Jamee Alaniz MD. [employer] 978591 - REV 12/22.    How to Take Your Medication Before Surgery    Avoid aspirin and over-the-counter NSAIDs including ibuprofen, Motrin, Advil and Aleve for 1 week prior to your surgery    You may take over-the-counter Tylenol/acetaminophen if you need something for pain    On the morning of your surgery, take your usual doses of omeprazole, citalopram, and levothyroxine with a small sip of water.  Hold all of your other medications including lisinopril

## 2024-04-23 LAB
ANION GAP SERPL CALCULATED.3IONS-SCNC: 10 MMOL/L (ref 7–15)
BUN SERPL-MCNC: 18.1 MG/DL (ref 8–23)
CALCIUM SERPL-MCNC: 9.6 MG/DL (ref 8.8–10.2)
CHLORIDE SERPL-SCNC: 106 MMOL/L (ref 98–107)
CREAT SERPL-MCNC: 1.15 MG/DL (ref 0.51–0.95)
DEPRECATED HCO3 PLAS-SCNC: 24 MMOL/L (ref 22–29)
EGFRCR SERPLBLD CKD-EPI 2021: 52 ML/MIN/1.73M2
GLUCOSE SERPL-MCNC: 94 MG/DL (ref 70–99)
POTASSIUM SERPL-SCNC: 4.1 MMOL/L (ref 3.4–5.3)
SODIUM SERPL-SCNC: 140 MMOL/L (ref 135–145)

## 2024-05-09 ENCOUNTER — ANCILLARY PROCEDURE (OUTPATIENT)
Dept: MAMMOGRAPHY | Facility: CLINIC | Age: 69
End: 2024-05-09
Attending: INTERNAL MEDICINE
Payer: COMMERCIAL

## 2024-05-09 DIAGNOSIS — Z12.31 VISIT FOR SCREENING MAMMOGRAM: ICD-10-CM

## 2024-05-09 PROCEDURE — 77067 SCR MAMMO BI INCL CAD: CPT | Mod: TC | Performed by: RADIOLOGY

## 2024-05-20 ENCOUNTER — ANESTHESIA (OUTPATIENT)
Dept: SURGERY | Facility: CLINIC | Age: 69
End: 2024-05-20
Payer: COMMERCIAL

## 2024-05-20 ENCOUNTER — MEDICAL CORRESPONDENCE (OUTPATIENT)
Dept: HEALTH INFORMATION MANAGEMENT | Facility: CLINIC | Age: 69
End: 2024-05-20

## 2024-05-20 ENCOUNTER — APPOINTMENT (OUTPATIENT)
Dept: GENERAL RADIOLOGY | Facility: CLINIC | Age: 69
End: 2024-05-20
Attending: ORTHOPAEDIC SURGERY
Payer: COMMERCIAL

## 2024-05-20 ENCOUNTER — ANESTHESIA EVENT (OUTPATIENT)
Dept: SURGERY | Facility: CLINIC | Age: 69
End: 2024-05-20
Payer: COMMERCIAL

## 2024-05-20 ENCOUNTER — HOSPITAL ENCOUNTER (OUTPATIENT)
Facility: CLINIC | Age: 69
Discharge: HOME | End: 2024-05-20
Attending: ORTHOPAEDIC SURGERY | Admitting: ORTHOPAEDIC SURGERY
Payer: COMMERCIAL

## 2024-05-20 VITALS
HEIGHT: 63 IN | BODY MASS INDEX: 35.38 KG/M2 | RESPIRATION RATE: 11 BRPM | TEMPERATURE: 97.6 F | OXYGEN SATURATION: 93 % | WEIGHT: 199.7 LBS | DIASTOLIC BLOOD PRESSURE: 67 MMHG | SYSTOLIC BLOOD PRESSURE: 114 MMHG | HEART RATE: 77 BPM

## 2024-05-20 DIAGNOSIS — G89.29 CHRONIC FOOT PAIN, LEFT: Primary | ICD-10-CM

## 2024-05-20 DIAGNOSIS — M79.672 CHRONIC FOOT PAIN, LEFT: Primary | ICD-10-CM

## 2024-05-20 PROCEDURE — 250N000013 HC RX MED GY IP 250 OP 250 PS 637: Performed by: ORTHOPAEDIC SURGERY

## 2024-05-20 PROCEDURE — 250N000011 HC RX IP 250 OP 636: Performed by: STUDENT IN AN ORGANIZED HEALTH CARE EDUCATION/TRAINING PROGRAM

## 2024-05-20 PROCEDURE — 272N000002 HC OR SUPPLY OTHER OPNP: Performed by: ORTHOPAEDIC SURGERY

## 2024-05-20 PROCEDURE — 250N000009 HC RX 250: Performed by: STUDENT IN AN ORGANIZED HEALTH CARE EDUCATION/TRAINING PROGRAM

## 2024-05-20 PROCEDURE — 999N000179 XR SURGERY CARM FLUORO LESS THAN 5 MIN W STILLS

## 2024-05-20 PROCEDURE — 250N000013 HC RX MED GY IP 250 OP 250 PS 637: Performed by: STUDENT IN AN ORGANIZED HEALTH CARE EDUCATION/TRAINING PROGRAM

## 2024-05-20 PROCEDURE — 258N000003 HC RX IP 258 OP 636: Performed by: STUDENT IN AN ORGANIZED HEALTH CARE EDUCATION/TRAINING PROGRAM

## 2024-05-20 PROCEDURE — 370N000017 HC ANESTHESIA TECHNICAL FEE, PER MIN: Performed by: ORTHOPAEDIC SURGERY

## 2024-05-20 PROCEDURE — 360N000083 HC SURGERY LEVEL 3 W/ FLUORO, PER MIN: Performed by: ORTHOPAEDIC SURGERY

## 2024-05-20 PROCEDURE — 27687 REVISION OF CALF TENDON: CPT

## 2024-05-20 PROCEDURE — 271N000001 HC OR GENERAL SUPPLY NON-STERILE: Performed by: ORTHOPAEDIC SURGERY

## 2024-05-20 PROCEDURE — 250N000011 HC RX IP 250 OP 636: Performed by: ORTHOPAEDIC SURGERY

## 2024-05-20 PROCEDURE — 710N000009 HC RECOVERY PHASE 1, LEVEL 1, PER MIN: Performed by: ORTHOPAEDIC SURGERY

## 2024-05-20 PROCEDURE — C1769 GUIDE WIRE: HCPCS | Performed by: ORTHOPAEDIC SURGERY

## 2024-05-20 PROCEDURE — 272N000001 HC OR GENERAL SUPPLY STERILE: Performed by: ORTHOPAEDIC SURGERY

## 2024-05-20 PROCEDURE — 27687 REVISION OF CALF TENDON: CPT | Performed by: STUDENT IN AN ORGANIZED HEALTH CARE EDUCATION/TRAINING PROGRAM

## 2024-05-20 PROCEDURE — 999N000141 HC STATISTIC PRE-PROCEDURE NURSING ASSESSMENT: Performed by: ORTHOPAEDIC SURGERY

## 2024-05-20 PROCEDURE — 710N000012 HC RECOVERY PHASE 2, PER MINUTE: Performed by: ORTHOPAEDIC SURGERY

## 2024-05-20 PROCEDURE — C1713 ANCHOR/SCREW BN/BN,TIS/BN: HCPCS | Performed by: ORTHOPAEDIC SURGERY

## 2024-05-20 DEVICE — IMPLANTABLE DEVICE: Type: IMPLANTABLE DEVICE | Site: ANKLE | Status: FUNCTIONAL

## 2024-05-20 DEVICE — IMP SCR ARTHREX CAN LP 6.7X45MM 18MM THRD AR-8967-1845: Type: IMPLANTABLE DEVICE | Site: ANKLE | Status: FUNCTIONAL

## 2024-05-20 DEVICE — IMP PLATE ARTHREX STR LOW PROFILE 3.0MM 2H TI AR-8952TS-02: Type: IMPLANTABLE DEVICE | Site: ANKLE | Status: FUNCTIONAL

## 2024-05-20 DEVICE — SCREW BN 24MM 3MM TI VA NS KREULOCK LF AR-8933VCL-24: Type: IMPLANTABLE DEVICE | Site: ANKLE | Status: FUNCTIONAL

## 2024-05-20 RX ORDER — LIDOCAINE HYDROCHLORIDE 20 MG/ML
INJECTION, SOLUTION INFILTRATION; PERINEURAL PRN
Status: DISCONTINUED | OUTPATIENT
Start: 2024-05-20 | End: 2024-05-20

## 2024-05-20 RX ORDER — ONDANSETRON 4 MG/1
4 TABLET, ORALLY DISINTEGRATING ORAL EVERY 8 HOURS PRN
Qty: 4 TABLET | Refills: 0 | Status: SHIPPED | OUTPATIENT
Start: 2024-05-20 | End: 2024-08-12

## 2024-05-20 RX ORDER — ROPIVACAINE HYDROCHLORIDE 5 MG/ML
INJECTION, SOLUTION EPIDURAL; INFILTRATION; PERINEURAL PRN
Status: DISCONTINUED | OUTPATIENT
Start: 2024-05-20 | End: 2024-05-20 | Stop reason: HOSPADM

## 2024-05-20 RX ORDER — HYDROXYZINE HYDROCHLORIDE 10 MG/1
10 TABLET, FILM COATED ORAL
Status: DISCONTINUED | OUTPATIENT
Start: 2024-05-20 | End: 2024-05-20 | Stop reason: HOSPADM

## 2024-05-20 RX ORDER — NALOXONE HYDROCHLORIDE 0.4 MG/ML
0.1 INJECTION, SOLUTION INTRAMUSCULAR; INTRAVENOUS; SUBCUTANEOUS
Status: DISCONTINUED | OUTPATIENT
Start: 2024-05-20 | End: 2024-05-20 | Stop reason: HOSPADM

## 2024-05-20 RX ORDER — PROPOFOL 10 MG/ML
INJECTION, EMULSION INTRAVENOUS PRN
Status: DISCONTINUED | OUTPATIENT
Start: 2024-05-20 | End: 2024-05-20

## 2024-05-20 RX ORDER — ROPIVACAINE HYDROCHLORIDE 5 MG/ML
INJECTION, SOLUTION EPIDURAL; INFILTRATION; PERINEURAL
Status: DISCONTINUED
Start: 2024-05-20 | End: 2024-05-20 | Stop reason: HOSPADM

## 2024-05-20 RX ORDER — SODIUM CHLORIDE, SODIUM LACTATE, POTASSIUM CHLORIDE, CALCIUM CHLORIDE 600; 310; 30; 20 MG/100ML; MG/100ML; MG/100ML; MG/100ML
INJECTION, SOLUTION INTRAVENOUS CONTINUOUS
Status: DISCONTINUED | OUTPATIENT
Start: 2024-05-20 | End: 2024-05-20 | Stop reason: HOSPADM

## 2024-05-20 RX ORDER — FENTANYL CITRATE 50 UG/ML
INJECTION, SOLUTION INTRAMUSCULAR; INTRAVENOUS PRN
Status: DISCONTINUED | OUTPATIENT
Start: 2024-05-20 | End: 2024-05-20

## 2024-05-20 RX ORDER — FENTANYL CITRATE 0.05 MG/ML
50 INJECTION, SOLUTION INTRAMUSCULAR; INTRAVENOUS EVERY 5 MIN PRN
Status: DISCONTINUED | OUTPATIENT
Start: 2024-05-20 | End: 2024-05-20 | Stop reason: HOSPADM

## 2024-05-20 RX ORDER — SCOLOPAMINE TRANSDERMAL SYSTEM 1 MG/1
1 PATCH, EXTENDED RELEASE TRANSDERMAL
Status: DISCONTINUED | OUTPATIENT
Start: 2024-05-20 | End: 2024-05-20 | Stop reason: HOSPADM

## 2024-05-20 RX ORDER — CEFAZOLIN SODIUM/WATER 2 G/20 ML
2 SYRINGE (ML) INTRAVENOUS
Status: COMPLETED | OUTPATIENT
Start: 2024-05-20 | End: 2024-05-20

## 2024-05-20 RX ORDER — PROPOFOL 10 MG/ML
INJECTION, EMULSION INTRAVENOUS CONTINUOUS PRN
Status: DISCONTINUED | OUTPATIENT
Start: 2024-05-20 | End: 2024-05-20

## 2024-05-20 RX ORDER — DEXAMETHASONE SODIUM PHOSPHATE 4 MG/ML
4 INJECTION, SOLUTION INTRA-ARTICULAR; INTRALESIONAL; INTRAMUSCULAR; INTRAVENOUS; SOFT TISSUE
Status: DISCONTINUED | OUTPATIENT
Start: 2024-05-20 | End: 2024-05-20 | Stop reason: HOSPADM

## 2024-05-20 RX ORDER — AMOXICILLIN 250 MG
1-2 CAPSULE ORAL 2 TIMES DAILY
Qty: 30 TABLET | Refills: 0 | Status: SHIPPED | OUTPATIENT
Start: 2024-05-20 | End: 2024-08-12

## 2024-05-20 RX ORDER — FENTANYL CITRATE 0.05 MG/ML
25 INJECTION, SOLUTION INTRAMUSCULAR; INTRAVENOUS EVERY 5 MIN PRN
Status: DISCONTINUED | OUTPATIENT
Start: 2024-05-20 | End: 2024-05-20 | Stop reason: HOSPADM

## 2024-05-20 RX ORDER — DEXAMETHASONE SODIUM PHOSPHATE 4 MG/ML
INJECTION, SOLUTION INTRA-ARTICULAR; INTRALESIONAL; INTRAMUSCULAR; INTRAVENOUS; SOFT TISSUE PRN
Status: DISCONTINUED | OUTPATIENT
Start: 2024-05-20 | End: 2024-05-20

## 2024-05-20 RX ORDER — ACETAMINOPHEN 325 MG/1
650 TABLET ORAL EVERY 4 HOURS PRN
Qty: 50 TABLET | Refills: 0 | Status: SHIPPED | OUTPATIENT
Start: 2024-05-20

## 2024-05-20 RX ORDER — ONDANSETRON 2 MG/ML
INJECTION INTRAMUSCULAR; INTRAVENOUS PRN
Status: DISCONTINUED | OUTPATIENT
Start: 2024-05-20 | End: 2024-05-20

## 2024-05-20 RX ORDER — ACETAMINOPHEN 325 MG/1
650 TABLET ORAL
Status: DISCONTINUED | OUTPATIENT
Start: 2024-05-20 | End: 2024-05-20 | Stop reason: HOSPADM

## 2024-05-20 RX ORDER — ONDANSETRON 4 MG/1
4 TABLET, ORALLY DISINTEGRATING ORAL EVERY 30 MIN PRN
Status: DISCONTINUED | OUTPATIENT
Start: 2024-05-20 | End: 2024-05-20 | Stop reason: HOSPADM

## 2024-05-20 RX ORDER — CEFAZOLIN SODIUM/WATER 2 G/20 ML
2 SYRINGE (ML) INTRAVENOUS SEE ADMIN INSTRUCTIONS
Status: DISCONTINUED | OUTPATIENT
Start: 2024-05-20 | End: 2024-05-20 | Stop reason: HOSPADM

## 2024-05-20 RX ORDER — OXYCODONE HYDROCHLORIDE 5 MG/1
5 TABLET ORAL
Status: COMPLETED | OUTPATIENT
Start: 2024-05-20 | End: 2024-05-20

## 2024-05-20 RX ORDER — OXYCODONE HYDROCHLORIDE 5 MG/1
5-10 TABLET ORAL EVERY 4 HOURS PRN
Qty: 30 TABLET | Refills: 0 | Status: SHIPPED | OUTPATIENT
Start: 2024-05-20 | End: 2024-08-12

## 2024-05-20 RX ORDER — SODIUM CHLORIDE, SODIUM LACTATE, POTASSIUM CHLORIDE, CALCIUM CHLORIDE 600; 310; 30; 20 MG/100ML; MG/100ML; MG/100ML; MG/100ML
INJECTION, SOLUTION INTRAVENOUS CONTINUOUS PRN
Status: DISCONTINUED | OUTPATIENT
Start: 2024-05-20 | End: 2024-05-20

## 2024-05-20 RX ORDER — ONDANSETRON 4 MG/1
4 TABLET, ORALLY DISINTEGRATING ORAL
Status: DISCONTINUED | OUTPATIENT
Start: 2024-05-20 | End: 2024-05-20 | Stop reason: HOSPADM

## 2024-05-20 RX ORDER — HYDROMORPHONE HCL IN WATER/PF 6 MG/30 ML
0.4 PATIENT CONTROLLED ANALGESIA SYRINGE INTRAVENOUS EVERY 5 MIN PRN
Status: DISCONTINUED | OUTPATIENT
Start: 2024-05-20 | End: 2024-05-20 | Stop reason: HOSPADM

## 2024-05-20 RX ORDER — HYDROMORPHONE HCL IN WATER/PF 6 MG/30 ML
0.2 PATIENT CONTROLLED ANALGESIA SYRINGE INTRAVENOUS EVERY 5 MIN PRN
Status: DISCONTINUED | OUTPATIENT
Start: 2024-05-20 | End: 2024-05-20 | Stop reason: HOSPADM

## 2024-05-20 RX ORDER — ONDANSETRON 2 MG/ML
4 INJECTION INTRAMUSCULAR; INTRAVENOUS EVERY 30 MIN PRN
Status: DISCONTINUED | OUTPATIENT
Start: 2024-05-20 | End: 2024-05-20 | Stop reason: HOSPADM

## 2024-05-20 RX ORDER — OXYCODONE HYDROCHLORIDE 5 MG/1
5 TABLET ORAL ONCE
Status: COMPLETED | OUTPATIENT
Start: 2024-05-20 | End: 2024-05-20

## 2024-05-20 RX ADMIN — Medication 2 G: at 09:30

## 2024-05-20 RX ADMIN — PROPOFOL 30 MG: 10 INJECTION, EMULSION INTRAVENOUS at 10:26

## 2024-05-20 RX ADMIN — ONDANSETRON 4 MG: 2 INJECTION INTRAMUSCULAR; INTRAVENOUS at 10:23

## 2024-05-20 RX ADMIN — PROPOFOL 200 MG: 10 INJECTION, EMULSION INTRAVENOUS at 09:41

## 2024-05-20 RX ADMIN — FENTANYL CITRATE 100 MCG: 50 INJECTION INTRAMUSCULAR; INTRAVENOUS at 09:41

## 2024-05-20 RX ADMIN — LIDOCAINE HYDROCHLORIDE 100 MG: 20 INJECTION, SOLUTION INFILTRATION; PERINEURAL at 09:41

## 2024-05-20 RX ADMIN — OXYCODONE HYDROCHLORIDE 5 MG: 5 TABLET ORAL at 11:38

## 2024-05-20 RX ADMIN — FENTANYL CITRATE 50 MCG: 50 INJECTION, SOLUTION INTRAMUSCULAR; INTRAVENOUS at 10:43

## 2024-05-20 RX ADMIN — DEXMEDETOMIDINE HYDROCHLORIDE 12 MCG: 100 INJECTION, SOLUTION INTRAVENOUS at 09:59

## 2024-05-20 RX ADMIN — SODIUM CHLORIDE, POTASSIUM CHLORIDE, SODIUM LACTATE AND CALCIUM CHLORIDE: 600; 310; 30; 20 INJECTION, SOLUTION INTRAVENOUS at 09:37

## 2024-05-20 RX ADMIN — FENTANYL CITRATE 50 MCG: 50 INJECTION, SOLUTION INTRAMUSCULAR; INTRAVENOUS at 11:04

## 2024-05-20 RX ADMIN — DEXAMETHASONE SODIUM PHOSPHATE 4 MG: 4 INJECTION, SOLUTION INTRA-ARTICULAR; INTRALESIONAL; INTRAMUSCULAR; INTRAVENOUS; SOFT TISSUE at 09:48

## 2024-05-20 RX ADMIN — PROPOFOL 150 MCG/KG/MIN: 10 INJECTION, EMULSION INTRAVENOUS at 09:43

## 2024-05-20 RX ADMIN — SCOPALAMINE 1 PATCH: 1 PATCH, EXTENDED RELEASE TRANSDERMAL at 09:15

## 2024-05-20 RX ADMIN — PROPOFOL 20 MG: 10 INJECTION, EMULSION INTRAVENOUS at 10:28

## 2024-05-20 RX ADMIN — OXYCODONE HYDROCHLORIDE 5 MG: 5 TABLET ORAL at 12:50

## 2024-05-20 ASSESSMENT — ACTIVITIES OF DAILY LIVING (ADL)
ADLS_ACUITY_SCORE: 35

## 2024-05-20 NOTE — DISCHARGE INSTRUCTIONS
Same Day Surgery Discharge Instructions for  Sedation and General Anesthesia     It's not unusual to feel dizzy, light-headed or faint for up to 24 hours after surgery or while taking pain medication.  If you have these symptoms: sit for a few minutes before standing and have someone assist you when you get up to walk or use the bathroom.    You should rest and relax for the next 24 hours. We recommend you make arrangements to have an adult stay with you for at least 24 hours after your discharge.  Avoid hazardous and strenuous activity.    DO NOT DRIVE any vehicle or operate mechanical equipment for 24 hours following the end of your surgery.  Even though you may feel normal, your reactions may be affected by the medication you have received.    Do not drink alcoholic beverages for 24 hours following surgery.     Slowly progress to your regular diet as you feel able. It's not unusual to feel nauseated and/or vomit after receiving anesthesia.  If you develop these symptoms, drink clear liquids (apple juice, ginger ale, broth, 7-up, etc. ) until you feel better.  If your nausea and vomiting persists for 24 hours, please notify your surgeon.      All narcotic pain medications, along with inactivity and anesthesia, can cause constipation. Drinking plenty of liquids and increasing fiber intake will help.    For any questions of a medical nature, call your surgeon.    Do not make important decisions for 24 hours.    If you had general anesthesia, you may have a sore throat for a couple of days related to the breathing tube used during surgery.  You may use Cepacol lozenges to help with this discomfort.  If it worsens or if you develop a fever, contact your surgeon.     If you feel your pain is not well managed with the pain medications prescribed by your surgeon, please contact your surgeon's office to let them know so they can address your concerns.       Crutch Instructions      Because of your surgery you will need  "crutches.  Make sure you tell your healthcare provider if crutches do not seem to work for you.  Using Crutches   Crutches are the most commonly used device for injuries to the lower part of the body because they allow the most mobility. All walking assistance devices require strength in your upper body but crutches require more coordination. If you are unable to use crutches then a cane or walker may be better.   Remember these rules when using crutches:   Always look straight ahead when you walk. Do not look down.   Hold the top padded part of the crutches into your chest near your armpits. Grasp the padded hand  and always support your weight with your arms and hands.   Do not lean on the crutches with your armpit as this could cause nerve damage.  Standing Up  Make sure you are in a stable chair. Move forward to the edge of the chair so that your  good  foot is flat on the floor. Put both crutches together and hold the handgrips in one hand. Stretch the injured leg out straight and then use the crutches with one hand and the chair armrest with the other hand to push yourself into a standing position onto your  good  leg. Once you are standing, wait until you are steady before placing a crutch in each hand. Until you become good at standing, have someone assist you in case you lose your balance. When standing still, lean slightly forward with the crutches ahead of you and about 3 feet apart. Unless you are told otherwise, you should keep weight off your injured leg.  Walking  To begin crutch walking, balance yourself on your  good  leg. Move both crutches forward about the length of one step and place them firmly on the floor in front of you about 3 feet apart. Support your weight on the padded hand rests while leaning forward. Press the top of the crutches against your chest wall and not into your armpits. Be sure to hold the injured leg up off of the floor. When ready, swing the \"good\" leg forward about " "one step length past the crutches while supporting your weight on the padded hand . Be careful not to go too far. Transfer your weight onto the \"good\" leg then bring both crutches forward about the length of one step. Repeating this motion will allow you to move fairly quickly without the use of your injured leg. Keep practicing until you become good at crutch walking.  Sitting Down  Make sure the chair you are about to sit on is stable. Walk in front of the chair such that you are facing away from it. Move back a little at a time until the back of your  good  leg is nearly touching the seat. Keeping your weight on the  good  leg, move both crutches to one hand holding onto the handgrips. Lean forward, bend your  good  knee, and move your injured leg out forward. Sit down slowly while using your free hand to reach for the chair s armrest for support. Place the crutches where you can easily get them. Never pivot, even on your  good  leg. This could cause you to fall or injure your  good  leg.  Navigating Stairs, Curbs & Door Steps  Only attempt this once you are very comfortable with regular crutch walking and only when someone is there to steady you should you begin to lose your balance. Hold on to a  railing with one hand and both crutches in the other hand or have someone carry the loose crutch for you. It does not matter which side the handrail is on. If there is no handrail, you can use both crutches. Using only crutches is the same as the railing method but maintaining your balance can be difficult. The crutch-only method is not recommended until you have become very good at crutch walking. Be sure to only take one step at a time. A simple rule to remember for crutches when navigating stairs or curbs: up with the  good  leg and down with the  bad .  Going Up Stairs or Curbs  Walk close to the first step with the crutches slightly behind you. Push down on the handrail and the crutch and step up " "with the \"good\" leg. You may have to make a short hop to do this if you are not allowed to place any weight on the injured leg. Lean forward and bring the injured leg and the crutch up beside the \"good\" leg. Repeat this until you have climbed up all of the steps. Remember, the \"good\" leg goes up first and the crutches move with the injured leg. The person helping you should stand behind and to your side that is away from the railing.  Going Down Stairs or Curbs  Walk to the edge of the first step. While standing and balancing on the  good  leg, place both crutches in one hand and then down on the step below. Be sure to support your weight by leaning on the crutches and handrail. Bring the injured leg forward, then the \"good\" leg down to the same step as the crutches. Remember crutches go down first with the injured leg. The person helping you should  front and to your side that is away from the railing.  Sitting Method for Navigating Stairs  A safer way to get up and down stairs is to sit down. To go up steps, sit down on the second or third step. Push with your  good  leg below while pushing up with both arms on the step above to move your bottom to the next step. When you get to the top of the stairs you may need to use the  scooting  method described later to get back up. To go down steps you first need to lower yourself to the floor near the top of the steps. Once seated, support yourself with your  good  leg on the second to next step below, and push with both arms on the step where you are sitting to move your bottom down to the next step. When you reach the bottom, pull yourself up to standing position using your crutches. It is helpful if someone can move your crutches and assist you in getting up and down. With practice it may be possible to manage on your own.  If You Start To Fall  If you start to fall and cannot get your balance, throw the crutches away from you. Try to fall onto your  good  side " "away from your injury and then break your fall with your arms. If uninjured, you can usually get back up by moving into a sitting position and scooting to a chair. Push with your arms and hands on the chair seat while pushing with the \"good\" leg to get up into the chair. You should not attempt to get back up if you are having significant pain. Call for assistance or dial 911 for an ambulance if necessary.  Safety Tips for Crutch Walking   At first you may want to wear a training belt (or a strong regular belt) so others can assist you.   Do not use crutches if you feel dizzy or drowsy.   Be careful on slick or wet surfaces like a kitchen or bathroom floor, snow, ice, or rainy conditions.   Temporarily remove pets, throw rugs or other items from your home that might trip you.   Do not hop around while holding onto furniture.   Wear sneakers or rubber soled shoes that will not easily slip or come off.   Be careful on ramps or slopes as they can be harder to walk up or down   Do not remove any parts from your crutches especially the padding or rubber traction footings. Replace padding or footings that become damaged immediately.   It is important to use your crutches correctly. If you feel any numbness or tingling in your arms, you are probably using the crutches incorrectly.  Helpful Hints   A bedside toilet or toilet riser may be helpful.   Always allow for extra time to get around. Children in school should be given permission to leave class early to avoid crowds when changing classes.   Elevate the injured leg when sitting.   Use a backpack to carry books or waist pouch to carry other items so that both hands are free.   Call your healthcare provider if you have any questions or concerns.    Information for Patients Discharging with a Transderm Scopolamine Patch     Dry mouth is a common side effect.  Drowsiness is another common side effect especially when combined with pain medication.  Please avoid activities " that require mental alertness such as driving a car or making important legal decisions.  Since Scopolamine can cause temporary dilation of the pupils and blurred vision if it comes in contact with the eyes; be sure to wash your hands thoroughly with soap and water immediately after handling the patch.   When you remove your patch, please stick it to a tissue or paper towel for disposal.    Remove the patch immediately and contact a physician in the unlikely event that you experience symptoms of acute glaucoma (pain and reddening of the eyes, accompanied by dilated pupils).  Remove the patch if you develop any difficulties urinating.  If you cannot urinate after removing your patch, please notify your surgeon.  Remove the patch 24 hours after surgery.     **If you have questions or concerns about your procedure,   call Dr. Tyler at [phone]**

## 2024-05-20 NOTE — OP NOTE
Preoperative diagnosis:  Longstanding left tibialis posterior tendon dysfunction with severe peritalar subluxation and a gastrocnemius contracture.  Failed left bunion repair done elsewhere.     Postoperative diagnosis:  The same.     Surgical procedure:  Left talonavicular fusion.  Left Dawson gastrocnemius lengthening.   BOAT metatarsal osteotomy and bunion repair as well as an Akin osteotomy of the proximal phalanx left     Anesthetic:  General.     Surgeon: Nila Tyler MD  Assistant: Rochelle Carvalho PA-C     Need for an assistant:  A skilled assistant was necessary for this case including preparation, retraction, placement of guides, protection of the neurovascular structures, wound closure and dressing application.     Preamble:  She had a bunion repair done elsewhere and that failed very quickly.  At the moment she has an intermetatarsal angle of 28 degrees.  She also has a longstanding tibialis posterior tendon dysfunction with severe peritalar subluxation.  She has a 15 degree equinus contracture.  She tried conservative management to no avail.     Informed consent was obtained for above-mentioned procedures.     Description of procedure:  After adequate induction of a general anesthetic the patient was positioned supine on the operating table.  The left leg was sterilely prepped and free draped in usual fashion.  Tourniquet around the thigh was inflated to 300 mmHg.    A 6 cm incision was made medial over the talonavicular joint.  There was significantly attenuation of the joint capsule.  Power osteotomes were used to remove the remnants of articular cartilage from the opposing surfaces of the joint.  Subchondral bone was exposed on both sides of the joint to induce bone healing.  Once fully prepared the talonavicular joint was reduced and immobilized with a compression screw from the navicular into the talus.  This was followed by a dorsal plate with 2 screws 1 in the talus and 1 in the navicular.   Excellent stability was obtained.    With the talonavicular joint in position there was a 15 degree equinus contracture.  A 5 cm incision was then made posterior over the calf.  The sural nerve was protected.  A Dawson gastrocnemius lengthening was done to allow 10 degrees dorsiflexion of the ankle.    Attention was moved to the bunion.     A 5 cm incision was made medial over the great toe MTP joint.  The capsule was incised longitudinally.  There was a large calcified area of the capsule that was excised.  This was followed by a 90 degree metatarsal osteotomy with a long plantar limb.  The capital fragment was translated laterally by 6 mm and immobilized with 2 small screws.     The sesamoids were then reduced and the medial capsule repaired.     This was followed by a 20 degree medially based closing wedge Berry osteotomy of the proximal phalanx.  The osteotomy was secured with a suture.     The tourniquet was deflated after intraoperative x-rays.  The wound closed in layers.  A sterile dressing and a light compressive bandage applied.  This was followed by a short leg cast splint.  She tolerated the procedure well and was taken to the recovery room in satisfactory condition.     She can ambulate toe-touch weightbearing  with crutches.  Sutures will be removed in 2 weeks.

## 2024-05-20 NOTE — ANESTHESIA PREPROCEDURE EVALUATION
Anesthesia Pre-Procedure Evaluation    Patient: Kandi Kim   MRN: [id number] : 1955        Procedure : Procedure(s):  LEFT TALONAVICULAR FUSION  AND LEFT GASTROC SLIDE  AND LEFT BUNION REPAIR          Past Medical History:   Diagnosis Date    Acute torn meniscus of knee, left, initial encounter 2018    Acute torn meniscus of knee, left, sequela 2019    Balance problems 2023    Bilateral hearing loss 2021    Hearing aids    Chest pain 2022    Chronic foot pain, left 2024    Chronic left shoulder pain 2021    Chronic sinusitis     Class 2 severe obesity with serious comorbidity and body mass index (BMI) of 37.0 to 37.9 in adult (H) 2022    Diverticulitis 2016    CT scan normal    Epiploic appendagitis 2024    GERD with esophagitis     Complaints of dysphagia with EGD showing esophagitis.  Negative for eosinophilic esophagitis 2019    History of colon polyps     Colonoscopy 2018 with multiple polyps.  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  Added automatically from request for surgery [id number]    Influenza A 2022    Migraines     Open-angle glaucoma of both eyes 2022    HONEY (obstructive sleep apnea)     Osteoarthritis     knees, hands    PONV (postoperative nausea and vomiting)     Postmenopausal symptoms     Primary osteoarthritis involving multiple joints 2022    Bilateral TKA and chronic pain involving left ankle and right shoulder    Primary osteoarthritis of both knees 2021    Bilateral TKA    Primary osteoarthritis of right knee 2016    R TKA    Rectal bleeding 2016    Rectocele 2016    Surgery is planned     Screening     DEXA normal 2016 and normal 2022    Situational anxiety     Status post total bilateral knee replacement 03/14/2022    Tear of right rotator cuff 06/22/2023    Shoulder surg 2023      Past Surgical History:   Procedure Laterality Date    ARTHROPLASTY KNEE Right 04/01/2016    ARTHROPLASTY KNEE Left     jan 2020    ARTHROSCOPY KNEE Bilateral 2011 2014    BUNIONECTOMY      COLPORRHAPHY N/A 02/21/2017    Procedure: POSTERIOR COLPORRHAPHY;  Surgeon: Roxana Frias MD;  Location: [facility] OR;  Service:     HYSTERECTOMY VAGINAL N/A 02/21/2017    Procedure: TOTAL VAGINAL HYSTERECTOMY, BILATERAL SALPINGO-OOPHORECTOMY;  Surgeon: Roxana Frias MD;  Location: [facility] OR;  Service:     MIDDLE EAR SURGERY      NEPHRECTOMY Left     Kidney donor    OOPHORECTOMY      SHOULDER ARTHROSCOPY W/ ROTATOR CUFF REPAIR Right     TUBAL LIGATION        Allergies   Allergen Reactions    Synvisc [Hylan G-F 20]       Social History     Tobacco Use    Smoking status: Never     Passive exposure: Never    Smokeless tobacco: Never   Substance Use Topics    Alcohol use: Yes     Comment: Alcoholic Drinks/day: infrequent      Wt Readings from Last 1 Encounters:   05/20/24 90.6 kg (199 lb 11.2 oz)        Anesthesia Evaluation   Pt has had prior anesthetic.     History of anesthetic complications  - PONV.      ROS/MED HX  ENT/Pulmonary: Comment: Chronic sinusitis, bilateral hearing loss     (+) sleep apnea, doesn't use CPAP,                                      Neurologic:    (-) no CVA and no TIA   Cardiovascular:     (+) Dyslipidemia hypertension- -   -  - -                                   (-) pacemaker, stent, pacemaker and ICD   METS/Exercise Tolerance: >4 METS    Hematologic:  - neg hematologic  ROS     Musculoskeletal:   (+)  arthritis,             GI/Hepatic:     (+) GERD,                   Renal/Genitourinary: Comment: Donated one kidney, other kidney functioning       Endo:     (+)          thyroid problem,  "hypothyroidism,    Obesity,       Psychiatric/Substance Use:  - neg psychiatric ROS     Infectious Disease:  - neg infectious disease ROS     Malignancy:  - neg malignancy ROS     Other:  - neg other ROS          Physical Exam    Airway        Mallampati: II       Respiratory Devices and Support         Dental       (+) Modest Abnormalities - crowns, retainers, 1 or 2 missing teeth      Cardiovascular   cardiovascular exam normal          Pulmonary   pulmonary exam normal                OUTSIDE LABS:  CBC:   Lab Results   Component Value Date    WBC 6.0 04/22/2024    WBC 5.0 02/13/2024    HGB 13.4 04/22/2024    HGB 13.5 02/13/2024    HCT 39.4 04/22/2024    HCT 39.9 02/13/2024     04/22/2024     02/13/2024     BMP:   Lab Results   Component Value Date     04/22/2024     02/13/2024    POTASSIUM 4.1 04/22/2024    POTASSIUM 4.3 02/13/2024    CHLORIDE 106 04/22/2024    CHLORIDE 109 (H) 02/13/2024    CO2 24 04/22/2024    CO2 25 02/13/2024    BUN 18.1 04/22/2024    BUN 12.4 02/13/2024    CR 1.15 (H) 04/22/2024    CR 0.96 (H) 02/13/2024    GLC 94 04/22/2024     (H) 02/13/2024     COAGS:   Lab Results   Component Value Date    PTT 30 09/26/2023    INR 0.95 09/26/2023     POC: No results found for: \"BGM\", \"HCG\", \"HCGS\"  HEPATIC:   Lab Results   Component Value Date    ALBUMIN 4.2 02/13/2024    PROTTOTAL 6.8 02/13/2024    ALT 15 02/13/2024    AST 19 02/13/2024    ALKPHOS 114 02/13/2024    BILITOTAL 0.6 02/13/2024     OTHER:   Lab Results   Component Value Date    A1C 5.6 09/26/2023    JERICA 9.6 04/22/2024    MAG 2.1 02/13/2024    LIPASE 55 (H) 02/13/2022    TSH 1.03 01/25/2024    T4 1.07 09/26/2023       Anesthesia Plan    ASA Status:  2    NPO Status:  NPO Appropriate    Anesthesia Type: General.     - Airway: LMA   Induction: Propofol.   Maintenance: Balanced.        Consents    Anesthesia Plan(s) and associated risks, benefits, and realistic alternatives discussed. Questions answered and " "patient/representative(s) expressed understanding.     - Discussed:     - Discussed with:  Patient            Postoperative Care    Pain management: IV analgesics, Oral pain medications.   PONV prophylaxis: Ondansetron (or other 5HT-3), Dexamethasone or Solumedrol, Background Propofol Infusion, Scopolamine patch     Comments:               Cuba Mcgarry MD    I have reviewed the pertinent notes and labs in the chart from the past 30 days and (re)examined the patient.  Any updates or changes from those notes are reflected in this note.              # Obesity: Estimated body mass index is 35.38 kg/m  as calculated from the following:    Height as of this encounter: 1.6 m (5' 3\").    Weight as of this encounter: 90.6 kg (199 lb 11.2 oz).      "

## 2024-05-20 NOTE — ANESTHESIA CARE TRANSFER NOTE
Patient: Kandi Kim    Procedure: Procedure(s):  LEFT TALONAVICULAR FUSION  AND LEFT GASTROC SLIDE  AND LEFT BUNION REPAIR       Diagnosis: Bunion, left [M21.612]  Degenerative joint disease of left ankle [M19.072]  Diagnosis Additional Information: No value filed.    Anesthesia Type:   General     Note:    Oropharynx: oropharynx clear of all foreign objects and spontaneously breathing  Level of Consciousness: awake  Oxygen Supplementation: face mask  Level of Supplemental Oxygen (L/min / FiO2): 6  Independent Airway: airway patency satisfactory and stable  Dentition: dentition unchanged  Vital Signs Stable: post-procedure vital signs reviewed and stable  Report to RN Given: handoff report given  Patient transferred to: PACU    Handoff Report: Identifed the Patient, Identified the Reponsible Provider, Reviewed the pertinent medical history, Discussed the surgical course, Reviewed Intra-OP anesthesia mangement and issues during anesthesia, Set expectations for post-procedure period and Allowed opportunity for questions and acknowledgement of understanding      Vitals:  Vitals Value Taken Time   /86    Temp     Pulse 93    Resp 12    SpO2 97        Electronically Signed By: ROSE Gerard CRNA  May 20, 2024  10:38 AM

## 2024-05-20 NOTE — ANESTHESIA POSTPROCEDURE EVALUATION
Patient: Kandi Kim    Procedure: Procedure(s):  LEFT TALONAVICULAR FUSION  AND LEFT GASTROC SLIDE  AND LEFT BUNION REPAIR       Anesthesia Type:  General    Note:  Disposition: Outpatient   Postop Pain Control: Uneventful            Sign Out: Well controlled pain   PONV: No   Neuro/Psych: Uneventful            Sign Out: Acceptable/Baseline neuro status   Airway/Respiratory: Uneventful            Sign Out: Acceptable/Baseline resp. status   CV/Hemodynamics: Uneventful            Sign Out: Acceptable CV status   Other NRE: NONE   DID A NON-ROUTINE EVENT OCCUR?            Last vitals:  Vitals Value Taken Time   /67 05/20/24 1200   Temp 36.4  C (97.6  F) 05/20/24 1200   Pulse 80 05/20/24 1206   Resp 18 05/20/24 1206   SpO2 92 % 05/20/24 1206   Vitals shown include unfiled device data.    Electronically Signed By: Cuba Mcgarry MD  May 20, 2024  1:41 PM

## 2024-05-20 NOTE — ANESTHESIA PROCEDURE NOTES
Airway       Patient location during procedure: OR  Staff -        Anesthesiologist:  Cuba Mcgarry MD       CRNA: Zenia Cannon APRN CRNA       Other Anesthesia Staff: Sunil Seals       Performed By: JAYCE and with CRNAs       Procedure performed by resident/fellow/CRNA in presence of a teaching physician.    Consent for Airway        Urgency: elective  Indications and Patient Condition       Indications for airway management: susan-procedural       Induction type:intravenous       Mask difficulty assessment: 0 - not attempted    Final Airway Details       Final airway type: supraglottic airway    Supraglottic Airway Details        Type: LMA       Brand: I-Gel       LMA size: 4    Post intubation assessment        Placement verified by: capnometry, equal breath sounds and chest rise        Number of attempts at approach: 1       Secured with: tape       Ease of procedure: easy       Dentition: Intact and Unchanged

## 2024-05-29 ENCOUNTER — TRANSFERRED RECORDS (OUTPATIENT)
Dept: HEALTH INFORMATION MANAGEMENT | Facility: CLINIC | Age: 69
End: 2024-05-29
Payer: COMMERCIAL

## 2024-06-04 ENCOUNTER — TRANSFERRED RECORDS (OUTPATIENT)
Dept: HEALTH INFORMATION MANAGEMENT | Facility: CLINIC | Age: 69
End: 2024-06-04
Payer: COMMERCIAL

## 2024-06-26 ENCOUNTER — TRANSFERRED RECORDS (OUTPATIENT)
Dept: HEALTH INFORMATION MANAGEMENT | Facility: CLINIC | Age: 69
End: 2024-06-26
Payer: COMMERCIAL

## 2024-07-02 ENCOUNTER — TRANSFERRED RECORDS (OUTPATIENT)
Dept: HEALTH INFORMATION MANAGEMENT | Facility: CLINIC | Age: 69
End: 2024-07-02
Payer: COMMERCIAL

## 2024-07-07 DIAGNOSIS — F32.A DEPRESSION, UNSPECIFIED DEPRESSION TYPE: ICD-10-CM

## 2024-07-07 RX ORDER — CITALOPRAM HYDROBROMIDE 20 MG/1
TABLET ORAL
Qty: 90 TABLET | Refills: 2 | Status: SHIPPED | OUTPATIENT
Start: 2024-07-07

## 2024-08-07 ENCOUNTER — HOSPITAL ENCOUNTER (EMERGENCY)
Facility: CLINIC | Age: 69
Discharge: HOME | End: 2024-08-07
Attending: NURSE PRACTITIONER | Admitting: NURSE PRACTITIONER
Payer: COMMERCIAL

## 2024-08-07 ENCOUNTER — APPOINTMENT (OUTPATIENT)
Dept: CT IMAGING | Facility: CLINIC | Age: 69
End: 2024-08-07
Attending: NURSE PRACTITIONER
Payer: COMMERCIAL

## 2024-08-07 ENCOUNTER — APPOINTMENT (OUTPATIENT)
Dept: GENERAL RADIOLOGY | Facility: CLINIC | Age: 69
End: 2024-08-07
Attending: NURSE PRACTITIONER
Payer: COMMERCIAL

## 2024-08-07 ENCOUNTER — APPOINTMENT (OUTPATIENT)
Dept: ULTRASOUND IMAGING | Facility: CLINIC | Age: 69
End: 2024-08-07
Attending: NURSE PRACTITIONER
Payer: COMMERCIAL

## 2024-08-07 VITALS
DIASTOLIC BLOOD PRESSURE: 67 MMHG | TEMPERATURE: 98.1 F | OXYGEN SATURATION: 97 % | RESPIRATION RATE: 19 BRPM | HEART RATE: 74 BPM | SYSTOLIC BLOOD PRESSURE: 124 MMHG

## 2024-08-07 DIAGNOSIS — I44.7 LEFT BUNDLE BRANCH BLOCK: ICD-10-CM

## 2024-08-07 DIAGNOSIS — K80.50 BILIARY COLIC: ICD-10-CM

## 2024-08-07 DIAGNOSIS — R07.9 CHEST PAIN: ICD-10-CM

## 2024-08-07 DIAGNOSIS — R10.13 EPIGASTRIC PAIN: ICD-10-CM

## 2024-08-07 LAB
ALBUMIN SERPL BCG-MCNC: 4.2 G/DL (ref 3.5–5.2)
ALP SERPL-CCNC: 178 U/L (ref 40–150)
ALT SERPL W P-5'-P-CCNC: 33 U/L (ref 0–50)
ANION GAP SERPL CALCULATED.3IONS-SCNC: 15 MMOL/L (ref 7–15)
AST SERPL W P-5'-P-CCNC: 50 U/L (ref 0–45)
BASOPHILS # BLD AUTO: 0 10E3/UL (ref 0–0.2)
BASOPHILS NFR BLD AUTO: 1 %
BILIRUB SERPL-MCNC: 0.6 MG/DL
BUN SERPL-MCNC: 23.2 MG/DL (ref 8–23)
CALCIUM SERPL-MCNC: 9.6 MG/DL (ref 8.8–10.4)
CHLORIDE SERPL-SCNC: 106 MMOL/L (ref 98–107)
CREAT SERPL-MCNC: 1.05 MG/DL (ref 0.51–0.95)
D DIMER PPP FEU-MCNC: 0.88 UG/ML FEU (ref 0–0.5)
EGFRCR SERPLBLD CKD-EPI 2021: 58 ML/MIN/1.73M2
EOSINOPHIL # BLD AUTO: 0.1 10E3/UL (ref 0–0.7)
EOSINOPHIL NFR BLD AUTO: 1 %
ERYTHROCYTE [DISTWIDTH] IN BLOOD BY AUTOMATED COUNT: 11.9 % (ref 10–15)
GLUCOSE SERPL-MCNC: 147 MG/DL (ref 70–99)
HCO3 SERPL-SCNC: 20 MMOL/L (ref 22–29)
HCT VFR BLD AUTO: 39.4 % (ref 35–47)
HGB BLD-MCNC: 13.6 G/DL (ref 11.7–15.7)
HOLD SPECIMEN: NORMAL
HOLD SPECIMEN: NORMAL
IMM GRANULOCYTES # BLD: 0 10E3/UL
IMM GRANULOCYTES NFR BLD: 0 %
LIPASE SERPL-CCNC: 49 U/L (ref 13–60)
LYMPHOCYTES # BLD AUTO: 1.6 10E3/UL (ref 0.8–5.3)
LYMPHOCYTES NFR BLD AUTO: 18 %
MCH RBC QN AUTO: 30.4 PG (ref 26.5–33)
MCHC RBC AUTO-ENTMCNC: 34.5 G/DL (ref 31.5–36.5)
MCV RBC AUTO: 88 FL (ref 78–100)
MONOCYTES # BLD AUTO: 0.6 10E3/UL (ref 0–1.3)
MONOCYTES NFR BLD AUTO: 7 %
NEUTROPHILS # BLD AUTO: 6.3 10E3/UL (ref 1.6–8.3)
NEUTROPHILS NFR BLD AUTO: 73 %
NRBC # BLD AUTO: 0 10E3/UL
NRBC BLD AUTO-RTO: 0 /100
PLATELET # BLD AUTO: 281 10E3/UL (ref 150–450)
POTASSIUM SERPL-SCNC: 3.8 MMOL/L (ref 3.4–5.3)
PROT SERPL-MCNC: 7.1 G/DL (ref 6.4–8.3)
RBC # BLD AUTO: 4.48 10E6/UL (ref 3.8–5.2)
SODIUM SERPL-SCNC: 141 MMOL/L (ref 135–145)
TROPONIN T SERPL HS-MCNC: 7 NG/L
TROPONIN T SERPL HS-MCNC: 8 NG/L
WBC # BLD AUTO: 8.6 10E3/UL (ref 4–11)

## 2024-08-07 PROCEDURE — 80053 COMPREHEN METABOLIC PANEL: CPT | Performed by: NURSE PRACTITIONER

## 2024-08-07 PROCEDURE — 99285 EMERGENCY DEPT VISIT HI MDM: CPT | Mod: 25

## 2024-08-07 PROCEDURE — 36415 COLL VENOUS BLD VENIPUNCTURE: CPT | Performed by: NURSE PRACTITIONER

## 2024-08-07 PROCEDURE — 85025 COMPLETE CBC W/AUTO DIFF WBC: CPT | Performed by: NURSE PRACTITIONER

## 2024-08-07 PROCEDURE — 250N000013 HC RX MED GY IP 250 OP 250 PS 637: Performed by: NURSE PRACTITIONER

## 2024-08-07 PROCEDURE — 85379 FIBRIN DEGRADATION QUANT: CPT | Performed by: NURSE PRACTITIONER

## 2024-08-07 PROCEDURE — 250N000009 HC RX 250: Performed by: NURSE PRACTITIONER

## 2024-08-07 PROCEDURE — 84484 ASSAY OF TROPONIN QUANT: CPT | Performed by: NURSE PRACTITIONER

## 2024-08-07 PROCEDURE — 74177 CT ABD & PELVIS W/CONTRAST: CPT

## 2024-08-07 PROCEDURE — 76705 ECHO EXAM OF ABDOMEN: CPT

## 2024-08-07 PROCEDURE — 96374 THER/PROPH/DIAG INJ IV PUSH: CPT | Mod: 59

## 2024-08-07 PROCEDURE — 71045 X-RAY EXAM CHEST 1 VIEW: CPT

## 2024-08-07 PROCEDURE — 83690 ASSAY OF LIPASE: CPT | Performed by: NURSE PRACTITIONER

## 2024-08-07 PROCEDURE — 250N000011 HC RX IP 250 OP 636: Performed by: NURSE PRACTITIONER

## 2024-08-07 RX ORDER — IOPAMIDOL 755 MG/ML
83 INJECTION, SOLUTION INTRAVASCULAR ONCE
Status: COMPLETED | OUTPATIENT
Start: 2024-08-07 | End: 2024-08-07

## 2024-08-07 RX ORDER — ASPIRIN 81 MG/1
324 TABLET, CHEWABLE ORAL ONCE
Status: COMPLETED | OUTPATIENT
Start: 2024-08-07 | End: 2024-08-07

## 2024-08-07 RX ADMIN — ASPIRIN 81 MG CHEWABLE TABLET 324 MG: 81 TABLET CHEWABLE at 17:12

## 2024-08-07 RX ADMIN — IOPAMIDOL 83 ML: 755 INJECTION, SOLUTION INTRAVENOUS at 19:19

## 2024-08-07 RX ADMIN — PANTOPRAZOLE SODIUM 40 MG: 40 INJECTION, POWDER, FOR SOLUTION INTRAVENOUS at 17:12

## 2024-08-07 RX ADMIN — SODIUM CHLORIDE 100 ML: 9 INJECTION, SOLUTION INTRAVENOUS at 19:19

## 2024-08-07 ASSESSMENT — COLUMBIA-SUICIDE SEVERITY RATING SCALE - C-SSRS
2. HAVE YOU ACTUALLY HAD ANY THOUGHTS OF KILLING YOURSELF IN THE PAST MONTH?: NO
6. HAVE YOU EVER DONE ANYTHING, STARTED TO DO ANYTHING, OR PREPARED TO DO ANYTHING TO END YOUR LIFE?: NO
1. IN THE PAST MONTH, HAVE YOU WISHED YOU WERE DEAD OR WISHED YOU COULD GO TO SLEEP AND NOT WAKE UP?: NO

## 2024-08-07 ASSESSMENT — ACTIVITIES OF DAILY LIVING (ADL)
ADLS_ACUITY_SCORE: 35

## 2024-08-07 NOTE — ED TRIAGE NOTES
Reports sudden onset chest pain that started around 1pm. States it felt like heartburn at this time. Patient reports pain turned severe around 3pm when she was driving. Nitroglycerin given x3. 100mcg fentanyl given. Patient is diaphoretic and lightheaded. Patient is nauseous. Denies cardiac history.       Triage Assessment (Adult)       Row Name 08/07/24 1643          Triage Assessment    Airway WDL WDL        Respiratory WDL    Respiratory WDL WDL        Skin Circulation/Temperature WDL    Skin Circulation/Temperature WDL WDL        Cardiac WDL    Cardiac WDL X;chest pain        Peripheral/Neurovascular WDL    Peripheral Neurovascular WDL WDL        Cognitive/Neuro/Behavioral WDL    Cognitive/Neuro/Behavioral WDL WDL

## 2024-08-07 NOTE — ED PROVIDER NOTES
History     Chief Complaint   Patient presents with    Chest Pain     HPI  Kandi Kim is a 69 year old female with past medical history of osteoarthritis, hyperlipidemia, hypothyroidism, morbid obesity, hypertension, GERD, anxiety, epiploic appendagitis, diverticulitis, migraines who presents emergency room with epigastric chest pain-described as constant ache, sharp pain located in the epigastric substernal region.  Patient states onset of symptoms was at 1:00 when she was driving.  She states she thought it felt like heartburn.  Patient states that she thought it was getting better but then it became worse again and reports the chest pain rating at that time 10 out of 10 with associative sweats- and she called EMS and pulled over to the side of the road.  EMS got there and per EMS report they gave her 3 nitroglycerin with minimal relief and 100 mics of fentanyl with reported greater relief than the nitroglycerin.  They then presented patient here for further evaluation.  Patient denies history of ACS, STEMI, NSTEMI, CVA.  She denies history of diabetes.  She reports occasional alcohol approximately once weekly.  She denies tobacco use.  She states that she had an outpatient foot surgery approximately 2+ months ago.  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  Patient also denies any cough cold or flulike symptoms.    States prior to onset of symptoms she did have lunch at the [employer] and had fried walleye with eggs and toast.    Allergies:  Allergies   Allergen Reactions    Synvisc [Hylan G-F 20]        Problem List:    Patient Active Problem List    Diagnosis Date Noted    Chronic foot pain, left 04/22/2024     Priority: Medium    Epiploic appendagitis 01/25/2024     Priority: Medium    Balance problems 12/01/2023     Priority: Medium    Epistaxis 08/28/2023     Priority: Medium    Primary osteoarthritis involving  multiple joints 07/21/2022     Priority: Medium     Bilateral TKA and chronic pain involving left ankle and right shoulder      Open-angle glaucoma of both eyes 07/21/2022     Priority: Medium    Class 2 severe obesity with serious comorbidity and body mass index (BMI) of 37.0 to 37.9 in adult (H) 07/21/2022     Priority: Medium    Status post total bilateral knee replacement 03/14/2022     Priority: Medium    History of colon polyps 03/11/2022     Priority: Medium     Colonoscopy December 2018 with multiple polyps.  Colonoscopy March 2022 with one adenomatous polyp and several hyperplastic polyps      Bilateral hearing loss 03/26/2021     Priority: Medium     She has hearing aids        GERD with esophagitis      Priority: Medium     Complaints of dysphagia with EGD showing esophagitis.  Negative for   eosinophilic esophagitis September 2019        Solitary kidney, acquired 04/26/2016     Priority: Medium     Formatting of this note might be different from the original.  Solitary kidney, acquired - transplant donor        HTN (hypertension)      Priority: Medium    Hypothyroidism      Priority: Medium    Hyperlipidemia      Priority: Medium    HONEY (obstructive sleep apnea)      Priority: Medium    Postmenopausal symptoms      Priority: Medium    Chronic sinusitis      Priority: Medium        Past Medical History:    Past Medical History:   Diagnosis Date    Acute torn meniscus of knee, left, initial encounter 02/08/2018    Acute torn meniscus of knee, left, sequela 05/14/2019    Balance problems 12/01/2023    Bilateral hearing loss 03/26/2021    Chest pain 03/14/2022    Chronic foot pain, left 04/22/2024    Chronic left shoulder pain 05/03/2021    Chronic sinusitis     Class 2 severe obesity with serious comorbidity and body mass index (BMI) of 37.0 to 37.9 in adult (H) 07/21/2022    Diverticulitis 08/09/2016    Epiploic appendagitis 01/25/2024    GERD with esophagitis     History of colon polyps     HTN  (hypertension)     Hyperlipidemia     Hypothyroidism     Impingement syndrome of right shoulder 06/22/2023    Influenza A 03/14/2022    Migraines     Open-angle glaucoma of both eyes 07/21/2022    HONEY (obstructive sleep apnea)     Osteoarthritis     PONV (postoperative nausea and vomiting)     Postmenopausal symptoms     Primary osteoarthritis involving multiple joints 07/21/2022    Primary osteoarthritis of both knees 03/26/2021    Primary osteoarthritis of right knee 04/12/2016    Rectal bleeding 08/09/2016    Rectocele 11/11/2016    Screening     Situational anxiety     Status post total bilateral knee replacement 03/14/2022    Tear of right rotator cuff 06/22/2023       Past Surgical History:    Past Surgical History:   Procedure Laterality Date    ARTHRODESIS ANKLE Left 5/20/2024    Procedure: LEFT TALONAVICULAR FUSION;  Surgeon: Nila [facility] MD;  Location:  OR    ARTHROPLASTY KNEE Right 04/01/2016    ARTHROPLASTY KNEE Left     jan 2020    ARTHROSCOPY KNEE Bilateral 2011 2014    BUNIONECTOMY      BUNIONECTOMY Left 5/20/2024    Procedure: AND LEFT BUNION REPAIR;  Surgeon: Nila [facility] MD;  Location:  OR    COLPORRHAPHY N/A 02/21/2017    Procedure: POSTERIOR COLPORRHAPHY;  Surgeon: Roxana Frias MD;  Location: [facility] OR;  Service:     HYSTERECTOMY VAGINAL N/A 02/21/2017    Procedure: TOTAL VAGINAL HYSTERECTOMY, BILATERAL SALPINGO-OOPHORECTOMY;  Surgeon: Roxana Frias MD;  Location: [facility] OR;  Service:     MIDDLE EAR SURGERY      NEPHRECTOMY Left     Kidney donor    OOPHORECTOMY      RECESSION GASTROCNEMIUS Left 5/20/2024    Procedure: AND LEFT GASTROC SLIDE;  Surgeon: Nila Tyler MD;  Location:  OR    SHOULDER ARTHROSCOPY W/ ROTATOR CUFF REPAIR Right     TUBAL LIGATION         Family History:    Family History   Problem Relation Age of Onset    Cerebrovascular Disease Mother     Breast Cancer Sister 62.00    Breast Cancer Cousin         age in 50's        Social History:  Marital Status:   [2]  Social History     Tobacco Use    Smoking status: Never     Passive exposure: Never    Smokeless tobacco: Never   Vaping Use    Vaping status: Never Used   Substance Use Topics    Alcohol use: Yes     Comment: Alcoholic Drinks/day: infrequent    Drug use: No        Medications:    acetaminophen (TYLENOL) 325 MG tablet  amoxicillin (AMOXIL) 500 MG capsule  atorvastatin (LIPITOR) 20 MG tablet  citalopram (CELEXA) 20 MG tablet  latanoprost (XALATAN) 0.005 % ophthalmic solution  levothyroxine (SYNTHROID/LEVOTHROID) 125 MCG tablet  lisinopril (ZESTRIL) 10 MG tablet  omeprazole (PRILOSEC) 20 MG DR capsule  ondansetron (ZOFRAN ODT) 4 MG ODT tab  oxyCODONE (ROXICODONE) 5 MG tablet  senna-docusate (SENOKOT-S/PERICOLACE) 8.6-50 MG tablet      Review of Systems  As mentioned above in the history present illness. All other systems were reviewed and are negative.    Physical Exam   BP: (!) 125/95  Pulse: 76  Temp: 98.1  F (36.7  C)  Resp: 20  SpO2: 96 %      Physical Exam  Vitals and nursing note reviewed.   Constitutional:       General: She is in acute distress.      Appearance: She is well-developed. She is obese. She is not ill-appearing, toxic-appearing or diaphoretic.   HENT:      Head: Normocephalic and atraumatic.      Right Ear: External ear normal.      Left Ear: External ear normal.      Nose: No congestion.      Mouth/Throat:      Mouth: Mucous membranes are moist.   Eyes:      General:         Right eye: No discharge.         Left eye: No discharge.      Conjunctiva/sclera: Conjunctivae normal.   Cardiovascular:      Rate and Rhythm: Normal rate and regular rhythm.      Heart sounds: Normal heart sounds. No murmur heard.     No friction rub.   Pulmonary:      Effort: Pulmonary effort is normal. No respiratory distress.      Breath sounds: Normal breath sounds. No stridor. No wheezing or rales.   Chest:      Chest wall: No tenderness.   Abdominal:      General:  Bowel sounds are normal. There is no distension.      Palpations: Abdomen is soft.      Tenderness: There is no abdominal tenderness. There is no guarding or rebound.   Musculoskeletal:      Cervical back: Neck supple.   Skin:     General: Skin is warm and dry.      Capillary Refill: Capillary refill takes less than 2 seconds.      Coloration: Skin is not pale.      Findings: No erythema or rash.   Neurological:      General: No focal deficit present.      Mental Status: She is alert and oriented to person, place, and time.   Psychiatric:         Mood and Affect: Mood normal.         ED Course     ED Course as of 08/07/24 2024   Wed Aug 07, 2024   1709 EKG 12 lead  EKG read by Dr. Mahajan also send reveals normal sinus rhythm with a new left bundle branch block and left axis deviation is noted.  This is new when compared to previous EKG of February 13, 2024.   1746 EKG 12-lead, tracing only  Follow-up EKG reveals normal sinus rhythm, no acute ectopy, left axis deviation, left bundle branch block and no changes compared to previous EKG completed at 1709.   1803 Comprehensive metabolic panel(!)  Comprehensive metabolic panel reveals no acute electrolyte derangement, creatinine is within patient's normal baseline.  Niall phosphatase is elevated and AST is mildly elevated will order ultrasound gallbladder.   1830 D dimer quantitative(!)  D dimer is elevated- will discuss with patient age adjust still elevated.   1837 elevated D-dimer will order chest PE and to minimize radiation I will order a CT abdomen pelvis to include evaluation of the gallbladder as well   1857 Lipase  Lipase normal at 49 and reassuring for lack of any choledocho obstructive process   2011 US Abdomen Limited  FINDINGS:     GALLBLADDER: Biliary sludge. No shadowing gallstones. No gallbladder wall thickening or pericholecystic fluid. Negative sonographic Flores's sign.     BILE DUCTS: No biliary dilatation. The common duct measures 5 mm.     LIVER:  Mildly increased echogenicity from diffuse fatty infiltration. No focal mass.     RIGHT KIDNEY: The inferior pole is obscured by overlying bowel gas. No hydronephrosis identified.     PANCREAS: The visualized portions are normal.     No ascites.        2011 CT Chest (PE) Abdomen Pelvis w Contrast  IMPRESSION:  1.  No acute findings in the chest. No acute pulmonary embolism.     2.  No acute findings in the abdomen or pelvis.       Procedures      Results for orders placed or performed during the hospital encounter of 08/07/24 (from the past 24 hour(s))   [place] Draw    Narrative    The following orders were created for panel order [place] Draw.  Procedure                               Abnormality         Status                     ---------                               -----------         ------                     Extra Blue Top Tube[[id number]]                              Final result               Extra Red Top Tube[[id number]]                               Final result                 Please view results for these tests on the individual orders.   CBC with platelets, differential    Narrative    The following orders were created for panel order CBC with platelets, differential.  Procedure                               Abnormality         Status                     ---------                               -----------         ------                     CBC with platelets and d...[[id number]]                      Final result                 Please view results for these tests on the individual orders.   Troponin T, High Sensitivity   Result Value Ref Range    Troponin T, High Sensitivity 7 <=14 ng/L   Extra Blue Top Tube   Result Value Ref Range    Hold Specimen JIC    Extra Red Top Tube   Result Value Ref Range    Hold Specimen JIC    CBC with platelets and differential   Result Value Ref Range    WBC Count 8.6 4.0 - 11.0 10e3/uL    RBC Count 4.48 3.80 - 5.20 10e6/uL    Hemoglobin 13.6 11.7 - 15.7 g/dL     Hematocrit 39.4 35.0 - 47.0 %    MCV 88 78 - 100 fL    MCH 30.4 26.5 - 33.0 pg    MCHC 34.5 31.5 - 36.5 g/dL    RDW 11.9 10.0 - 15.0 %    Platelet Count 281 150 - 450 10e3/uL    % Neutrophils 73 %    % Lymphocytes 18 %    % Monocytes 7 %    % Eosinophils 1 %    % Basophils 1 %    % Immature Granulocytes 0 %    NRBCs per 100 WBC 0 <1 /100    Absolute Neutrophils 6.3 1.6 - 8.3 10e3/uL    Absolute Lymphocytes 1.6 0.8 - 5.3 10e3/uL    Absolute Monocytes 0.6 0.0 - 1.3 10e3/uL    Absolute Eosinophils 0.1 0.0 - 0.7 10e3/uL    Absolute Basophils 0.0 0.0 - 0.2 10e3/uL    Absolute Immature Granulocytes 0.0 <=0.4 10e3/uL    Absolute NRBCs 0.0 10e3/uL   Comprehensive metabolic panel   Result Value Ref Range    Sodium 141 135 - 145 mmol/L    Potassium 3.8 3.4 - 5.3 mmol/L    Carbon Dioxide (CO2) 20 (L) 22 - 29 mmol/L    Anion Gap 15 7 - 15 mmol/L    Urea Nitrogen 23.2 (H) 8.0 - 23.0 mg/dL    Creatinine 1.05 (H) 0.51 - 0.95 mg/dL    GFR Estimate 58 (L) >60 mL/min/1.73m2    Calcium 9.6 8.8 - 10.4 mg/dL    Chloride 106 98 - 107 mmol/L    Glucose 147 (H) 70 - 99 mg/dL    Alkaline Phosphatase 178 (H) 40 - 150 U/L    AST 50 (H) 0 - 45 U/L    ALT 33 0 - 50 U/L    Protein Total 7.1 6.4 - 8.3 g/dL    Albumin 4.2 3.5 - 5.2 g/dL    Bilirubin Total 0.6 <=1.2 mg/dL   D dimer quantitative   Result Value Ref Range    D-Dimer Quantitative 0.88 (H) 0.00 - 0.50 ug/mL FEU    Narrative    This D-dimer assay is intended for use in conjunction with a clinical pretest probability assessment model to exclude pulmonary embolism (PE) and deep venous thrombosis (DVT) in outpatients suspected of PE or DVT. The cut-off value is 0.50 ug/mL FEU.    For patients 50 years of age or older, the application of age-adjusted cut-off values for D-Dimer may increase the specificity without significant effect on sensitivity. The literature suggested calculation age adjusted cut-off in ug/L = age in years x 10 ug/L. The results in this laboratory are reported as ug/mL  rather than ug/L. The calculation for age adjusted cut off in ug/mL= age in years x 0.01 ug/mL. For example, the cut off for a 76 year old male is 78 x 0.01 ug/mL = 0.76 ug/mL (760 ug/L).    M Juan et al. Age adjusted D-dimer cut-off levels to rule out pulmonary embolism: The ADJUST-PE Study. ROMAINE 2014;311:6145-4906.; HJ Carli et al. Diagnostic accuracy of conventional or age adjusted D-dimer cutoff values in older patients with suspected venous thromboembolism. Systemic review and meta-analysis. BMJ 2013:346:f2492.   Lipase   Result Value Ref Range    Lipase 49 13 - 60 U/L   XR Chest Port 1 View    Narrative    EXAM: XR CHEST PORT 1 VIEW  LOCATION: [facility]  DATE: 8/7/2024    INDICATION: epigastric chest pain  COMPARISON: None.      Impression    IMPRESSION: Negative chest.   Troponin T, High Sensitivity   Result Value Ref Range    Troponin T, High Sensitivity 8 <=14 ng/L   US Abdomen Limited    Narrative    EXAM: US ABDOMEN LIMITED  LOCATION: [facility]  DATE: 8/7/2024    INDICATION: Epigastric abdominal pain.  COMPARISON: CT abdomen/pelvis 1/22/2024  TECHNIQUE: Limited abdominal ultrasound.    FINDINGS:    GALLBLADDER: Biliary sludge. No shadowing gallstones. No gallbladder wall thickening or pericholecystic fluid. Negative sonographic Flores's sign.    BILE DUCTS: No biliary dilatation. The common duct measures 5 mm.    LIVER: Mildly increased echogenicity from diffuse fatty infiltration. No focal mass.    RIGHT KIDNEY: The inferior pole is obscured by overlying bowel gas. No hydronephrosis identified.    PANCREAS: The visualized portions are normal.    No ascites.      Impression    IMPRESSION:  1.  Biliary sludge. No cholelithiasis or evidence of acute cholecystitis.    2.  Mild diffuse hepatic steatosis.       CT Chest (PE) Abdomen Pelvis w Contrast    Narrative    EXAM: CT CHEST PE ABDOMEN PELVIS W CONTRAST  LOCATION: [facility]  MEDICAL CENTER  DATE: 8/7/2024    INDICATION: Sudden onset chest pain. Substernal chest pain and epigastric abdominal pain Heartburn. Diaphoresis. Elevated d-dimer.  COMPARISON: CT abdomen/pelvis 1/22/2024 and 2/26/2017  TECHNIQUE: CT chest pulmonary angiogram and routine CT abdomen pelvis with IV contrast. Arterial phase through the chest and venous phase through the abdomen and pelvis. Multiplanar reformats and MIP reconstructions were performed. Dose reduction   techniques were used.   CONTRAST: 83 ml Isovue 370    FINDINGS:  ANGIOGRAM CHEST: Pulmonary arteries are normal caliber and negative for pulmonary emboli. Mild aortic calcifications. Thoracic aorta is negative for dissection. No CT evidence of right heart strain.     LUNGS AND PLEURA: Mild dependent atelectasis. No focal airspace disease. No pleural effusion or pneumothorax.    MEDIASTINUM/AXILLAE: No lymphadenopathy. No pericardial effusion.    CORONARY ARTERY CALCIFICATION: Cannot evaluate.    HEPATOBILIARY: Liver and gallbladder appear normal.    PANCREAS: Normal.    SPLEEN: Normal.    ADRENAL GLANDS: Normal.    KIDNEYS/BLADDER: Status post left nephrectomy. Right kidney appears normal. No hydronephrosis. Urinary bladder appears normal.    BOWEL: Duodenal diverticulum. Moderate colonic diverticulosis. No obstruction or inflammatory change. Normal appendix. No evidence of acute appendicitis.    LYMPH NODES: Enlarged retrocaval lymph node measuring 1.3 cm in short axis, unchanged since 2017, benign. No new lymphadenopathy.    VASCULATURE: Mild atherosclerotic calcifications.    PELVIC ORGANS: Status post hysterectomy.    MUSCULOSKELETAL: Multilevel degenerative changes of the spine.      Impression    IMPRESSION:  1.  No acute findings in the chest. No acute pulmonary embolism.    2.  No acute findings in the abdomen or pelvis.       Medications   aspirin (ASA) chewable tablet 324 mg (324 mg Oral $Given 8/7/24 1666)   pantoprazole (PROTONIX) IV push  injection 40 mg (40 mg Intravenous $Given 8/7/24 1712)   iopamidol (ISOVUE-370) solution 83 mL (83 mLs Intravenous $Given 8/7/24 1919)   sodium chloride 0.9 % bag 500mL for CT scan flush use (100 mLs As instructed $Given 8/7/24 1919)       Assessments & Plan (with Medical Decision Making)     I have reviewed the nursing notes.    I have reviewed the findings, diagnosis, plan and need for follow up with the patient.  69-year-old female with past medical history of hyperlipidemia, hypertension, morbid obesity presenting to the emergency room via EMS with epigastric substernal chest pain with associative sweats with onset of symptoms at approximately 1 PM unresponsive to nitroglycerin x 3 and fentanyl via EMS.  Patient denies any previous cardiac workup.  On exam patient appears to be worried, is vitally stable, lung sounds clear and abdominal sounds are normal and exam is otherwise unremarkable.  Differential diagnosis chest wall pain, PE, angina, STEMI, NSTEMI, peptic ulcer disease, gastritis, cholecystitis, biliary colic, choledocholithiasis.  Workup initiated  Findings are consistent with biliary colic without infectious etiology.  Reviewed these findings with patient.  Due to the bundle branch block being a new finding we will placed a referral for cardiology for outpatient evaluation and follow-up.  Patient agreeable to this plan of care as well    New Prescriptions    No medications on file       Final diagnoses:   Chest pain   Epigastric pain   Biliary colic   Left bundle branch block       8/7/2024   [facility] EMERGENCY DEPT       Roz Chen APRN CNP  08/07/24 2024

## 2024-08-08 NOTE — DISCHARGE INSTRUCTIONS
You were seen emergency room today for epigastric pain versus chest pain versus biliary colic.  Based on all of the lab work and imaging I suspect that you have biliary colic.  This means that the gallbladder may or may not be working at 100% capacity.  Right now there is no evidence of infection.  I do recommend a conversation with the general surgeon as to whether removing the gallbladder is a good option for you.  Your symptoms may be related to high-fat greasy diet and/or foods that would be ingested in the future.  Please return to the emergency room with uncontrolled pain, fevers, uncontrolled nausea and/or vomiting.  Thank you for coming to the emergency room today.

## 2024-08-12 ENCOUNTER — OFFICE VISIT (OUTPATIENT)
Dept: SURGERY | Facility: CLINIC | Age: 69
End: 2024-08-12
Attending: NURSE PRACTITIONER
Payer: COMMERCIAL

## 2024-08-12 VITALS
SYSTOLIC BLOOD PRESSURE: 142 MMHG | BODY MASS INDEX: 35.26 KG/M2 | WEIGHT: 199 LBS | HEIGHT: 63 IN | HEART RATE: 84 BPM | DIASTOLIC BLOOD PRESSURE: 83 MMHG

## 2024-08-12 DIAGNOSIS — R10.13 EPIGASTRIC PAIN: ICD-10-CM

## 2024-08-12 DIAGNOSIS — K80.50 BILIARY COLIC: ICD-10-CM

## 2024-08-12 PROCEDURE — 99204 OFFICE O/P NEW MOD 45 MIN: CPT | Performed by: SURGERY

## 2024-08-12 NOTE — LETTER
8/12/2024      Kandi Kim  [address] [facility] [address]      Dear Colleague,    Thank you for referring your patient, Kandi Kim, to the [facility] SURGERY CLINIC AND BARIATRICS CARE [place]. Please see a copy of my visit note below.    General Surgery Consult    HPI: I am consulted in this 69 year old female who has been experiencing some problems with biliary colic. She was recently seen in the ER for this.  She had a large meal and then noted significant upper abdominal pain and nausea.  Never had similar pain before.  No associated f/c.  Pain relieved after being seen in ED.    Allergies:Synvisc [hylan g-f 20]    Past Medical History:   Diagnosis Date     Acute torn meniscus of knee, left, initial encounter 02/08/2018     Acute torn meniscus of knee, left, sequela 05/14/2019     Balance problems 12/01/2023     Bilateral hearing loss 03/26/2021    Hearing aids     Chest pain 03/14/2022     Chronic foot pain, left 04/22/2024     Chronic left shoulder pain 05/03/2021     Chronic sinusitis      Class 2 severe obesity with serious comorbidity and body mass index (BMI) of 37.0 to 37.9 in adult (H) 07/21/2022     Diverticulitis 08/09/2016    CT scan normal     Epiploic appendagitis 01/25/2024     GERD with esophagitis     Complaints of dysphagia with EGD showing esophagitis.  Negative for eosinophilic esophagitis September 2019     History of colon polyps     Colonoscopy December 2018 with multiple polyps.  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  Added automatically from request for surgery [id number]     Influenza A 03/14/2022     Migraines      Open-angle glaucoma of both eyes 07/21/2022     HONEY (obstructive  sleep apnea)      Osteoarthritis     knees, hands     PONV (postoperative nausea and vomiting)      Postmenopausal symptoms      Primary osteoarthritis involving multiple joints 07/21/2022    Bilateral TKA and chronic pain involving left ankle and right shoulder     Primary osteoarthritis of both knees 03/26/2021    Bilateral TKA     Primary osteoarthritis of right knee 04/12/2016    R TKA     Rectal bleeding 08/09/2016     Rectocele 11/11/2016    Surgery is planned     Screening     DEXA normal 2016 and normal 2022     Situational anxiety      Status post total bilateral knee replacement 03/14/2022     Tear of right rotator cuff 06/22/2023    Shoulder surg 2023       Past Surgical History:   Procedure Laterality Date     ARTHRODESIS ANKLE Left 5/20/2024    Procedure: LEFT TALONAVICULAR FUSION;  Surgeon: Nila Tyler MD;  Location:  OR     ARTHROPLASTY KNEE Right 04/01/2016     ARTHROPLASTY KNEE Left     jan 2020     ARTHROSCOPY KNEE Bilateral 2011 2014     BUNIONECTOMY       BUNIONECTOMY Left 5/20/2024    Procedure: AND LEFT BUNION REPAIR;  Surgeon: Nila Tyler MD;  Location:  OR     COLPORRHAPHY N/A 02/21/2017    Procedure: POSTERIOR COLPORRHAPHY;  Surgeon: Roxana Frias MD;  Location: [facility] OR;  Service:      HYSTERECTOMY VAGINAL N/A 02/21/2017    Procedure: TOTAL VAGINAL HYSTERECTOMY, BILATERAL SALPINGO-OOPHORECTOMY;  Surgeon: Roxana Frias MD;  Location: [facility] OR;  Service:      MIDDLE EAR SURGERY       NEPHRECTOMY Left     Kidney donor     OOPHORECTOMY       RECESSION GASTROCNEMIUS Left 5/20/2024    Procedure: AND LEFT GASTROC SLIDE;  Surgeon: Nila Tyler MD;  Location:  OR     SHOULDER ARTHROSCOPY W/ ROTATOR CUFF REPAIR Right      TUBAL LIGATION       Lap hand assist left nephrectomy  Transvaginal ELAN/BSO    CURRENT MEDS:  Current Outpatient Medications   Medication Sig Dispense Refill     acetaminophen (TYLENOL) 325 MG tablet Take 2 tablets (650 mg) by  "mouth every 4 hours as needed for mild pain 50 tablet 0     atorvastatin (LIPITOR) 20 MG tablet TAKE 1 TABLET BY MOUTH EVERY DAY 90 tablet 3     citalopram (CELEXA) 20 MG tablet TAKE 1 TABLET BY MOUTH EVERY DAY 90 tablet 2     latanoprost (XALATAN) 0.005 % ophthalmic solution Place 1 drop into both eyes every evening       levothyroxine (SYNTHROID/LEVOTHROID) 125 MCG tablet Take 1 tablet (125 mcg) by mouth daily 90 tablet 3     lisinopril (ZESTRIL) 10 MG tablet TAKE 1 TABLET BY MOUTH EVERY DAY 90 tablet 3     omeprazole (PRILOSEC) 20 MG DR capsule TAKE 1 CAPSULE BY MOUTH EVERY DAY 90 capsule 3     Probiotic Product (FORTIFY PROBIOTIC WOMENS PO)        amoxicillin (AMOXIL) 500 MG capsule TAKE 4 CAPSULES (2,000 MG) BY MOUTH ONCE FOR 1 DOSE PRIOR TO DENTIST VISITS 4 capsule 3     No current facility-administered medications for this visit.       Family History   Problem Relation Age of Onset     Cerebrovascular Disease Mother      Breast Cancer Sister 62.00     Breast Cancer Cousin         age in 50's        reports that she has never smoked. She has never been exposed to tobacco smoke. She has never used smokeless tobacco. She reports current alcohol use. She reports that she does not use drugs.    Review of Systems - Pertinent items are noted in HPI.    BP (!) 142/83   Pulse 84   Ht 1.6 m (5' 3\")   Wt 90.3 kg (199 lb)   LMP  (LMP Unknown)   BMI 35.25 kg/m      EXAM:  GENERAL: NAD  MOUTH: Mucus membranes moist  SKIN: Grossly intact  EYES: No icterus  CARDIAC: Regular rate  CHEST/LUNG: NLB  ABDOMEN: Soft, RUQ non-tender, Flores's sign negative  BACK: No costovertebral tenderness  NEURO: Alert and oriented, nonfocal  PSYCHIATRIC: normal affect    LABS:  Lab Results   Component Value Date    WBC 8.6 08/07/2024    HGB 13.6 08/07/2024    HCT 39.4 08/07/2024    MCV 88 08/07/2024     08/07/2024     INR/Prothrombin Time  @LABRCNTIP(NA,K,CL,co2,bun,creatinine,labglom,glucose,calcium)@  Lab Results   Component Value " Date    ALT 33 08/07/2024    AST 50 (H) 08/07/2024    ALKPHOS 178 (H) 08/07/2024     IMAGES:     CT 8/76/24:    HEPATOBILIARY: Liver and gallbladder appear normal.     US 8/7/24:    IMPRESSION:    1.  Biliary sludge. No cholelithiasis or evidence of acute cholecystitis.     2.  Mild diffuse hepatic steatosis.    Assessment/Plan:     Pt with signs and symptoms consistent with biliary colic. I have recommended a laparoscopic cholecystecomy. I discussed with her that we might not be able to do this laparoscopically with low risk of going open. I went over some of the risks of surgery including but not limited to bleeding, infection, bile duct injury and anesthesia.       Discussed mild elevation of AST in setting of normal bile duct, normal bili, and steatosis on US, would not plan on IOC.    We also discussed new diagnosis of LBBB, she is seeing a cardiologist for evaluation next week.    She would like to think more about her decision and also see cardiologist prior to scheduling.  She will call my clinic when she'd like to move forward.    Syd Smith MD       Again, thank you for allowing me to participate in the care of your patient.        Sincerely,        Syd Smith MD

## 2024-08-12 NOTE — PROGRESS NOTES
General Surgery Consult    HPI: I am consulted in this 69 year old female who has been experiencing some problems with biliary colic. She was recently seen in the ER for this.  She had a large meal and then noted significant upper abdominal pain and nausea.  Never had similar pain before.  No associated f/c.  Pain relieved after being seen in ED.    Allergies:Synvisc [hylan g-f 20]    Past Medical History:   Diagnosis Date    Acute torn meniscus of knee, left, initial encounter 02/08/2018    Acute torn meniscus of knee, left, sequela 05/14/2019    Balance problems 12/01/2023    Bilateral hearing loss 03/26/2021    Hearing aids    Chest pain 03/14/2022    Chronic foot pain, left 04/22/2024    Chronic left shoulder pain 05/03/2021    Chronic sinusitis     Class 2 severe obesity with serious comorbidity and body mass index (BMI) of 37.0 to 37.9 in adult (H) 07/21/2022    Diverticulitis 08/09/2016    CT scan normal    Epiploic appendagitis 01/25/2024    GERD with esophagitis     Complaints of dysphagia with EGD showing esophagitis.  Negative for eosinophilic esophagitis September 2019    History of colon polyps     Colonoscopy December 2018 with multiple polyps.  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  Added automatically from request for surgery [id number]    Influenza A 03/14/2022    Migraines     Open-angle glaucoma of both eyes 07/21/2022    HONEY (obstructive sleep apnea)     Osteoarthritis     knees, hands    PONV (postoperative nausea and vomiting)     Postmenopausal symptoms     Primary osteoarthritis involving multiple joints 07/21/2022    Bilateral TKA and chronic pain involving left ankle and right shoulder    Primary osteoarthritis of both knees  03/26/2021    Bilateral TKA    Primary osteoarthritis of right knee 04/12/2016    R TKA    Rectal bleeding 08/09/2016    Rectocele 11/11/2016    Surgery is planned    Screening     DEXA normal 2016 and normal 2022    Situational anxiety     Status post total bilateral knee replacement 03/14/2022    Tear of right rotator cuff 06/22/2023    Shoulder surg 2023       Past Surgical History:   Procedure Laterality Date    ARTHRODESIS ANKLE Left 5/20/2024    Procedure: LEFT TALONAVICULAR FUSION;  Surgeon: Nila Tyler MD;  Location: [facility] OR    ARTHROPLASTY KNEE Right 04/01/2016    ARTHROPLASTY KNEE Left     jan 2020    ARTHROSCOPY KNEE Bilateral 2011 2014    BUNIONECTOMY      BUNIONECTOMY Left 5/20/2024    Procedure: AND LEFT BUNION REPAIR;  Surgeon: Nila Tyler MD;  Location:  OR    COLPORRHAPHY N/A 02/21/2017    Procedure: POSTERIOR COLPORRHAPHY;  Surgeon: Roxana Frias MD;  Location: [facility] OR;  Service:     HYSTERECTOMY VAGINAL N/A 02/21/2017    Procedure: TOTAL VAGINAL HYSTERECTOMY, BILATERAL SALPINGO-OOPHORECTOMY;  Surgeon: Roxana Frias MD;  Location: [facility] OR;  Service:     MIDDLE EAR SURGERY      NEPHRECTOMY Left     Kidney donor    OOPHORECTOMY      RECESSION GASTROCNEMIUS Left 5/20/2024    Procedure: AND LEFT GASTROC SLIDE;  Surgeon: Nila Tyler MD;  Location:  OR    SHOULDER ARTHROSCOPY W/ ROTATOR CUFF REPAIR Right     TUBAL LIGATION       Lap hand assist left nephrectomy  Transvaginal ELAN/BSO    CURRENT MEDS:  Current Outpatient Medications   Medication Sig Dispense Refill    acetaminophen (TYLENOL) 325 MG tablet Take 2 tablets (650 mg) by mouth every 4 hours as needed for mild pain 50 tablet 0    atorvastatin (LIPITOR) 20 MG tablet TAKE 1 TABLET BY MOUTH EVERY DAY 90 tablet 3    citalopram (CELEXA) 20 MG tablet TAKE 1 TABLET BY MOUTH EVERY DAY 90 tablet 2    latanoprost (XALATAN) 0.005 % ophthalmic solution Place 1 drop into both eyes every evening       "levothyroxine (SYNTHROID/LEVOTHROID) 125 MCG tablet Take 1 tablet (125 mcg) by mouth daily 90 tablet 3    lisinopril (ZESTRIL) 10 MG tablet TAKE 1 TABLET BY MOUTH EVERY DAY 90 tablet 3    omeprazole (PRILOSEC) 20 MG DR capsule TAKE 1 CAPSULE BY MOUTH EVERY DAY 90 capsule 3    Probiotic Product (FORTIFY PROBIOTIC WOMENS PO)       amoxicillin (AMOXIL) 500 MG capsule TAKE 4 CAPSULES (2,000 MG) BY MOUTH ONCE FOR 1 DOSE PRIOR TO DENTIST VISITS 4 capsule 3     No current facility-administered medications for this visit.       Family History   Problem Relation Age of Onset    Cerebrovascular Disease Mother     Breast Cancer Sister 62.00    Breast Cancer Cousin         age in 50's        reports that she has never smoked. She has never been exposed to tobacco smoke. She has never used smokeless tobacco. She reports current alcohol use. She reports that she does not use drugs.    Review of Systems - Pertinent items are noted in HPI.    BP (!) 142/83   Pulse 84   Ht 1.6 m (5' 3\")   Wt 90.3 kg (199 lb)   LMP  (LMP Unknown)   BMI 35.25 kg/m      EXAM:  GENERAL: NAD  MOUTH: Mucus membranes moist  SKIN: Grossly intact  EYES: No icterus  CARDIAC: Regular rate  CHEST/LUNG: NLB  ABDOMEN: Soft, RUQ non-tender, Flores's sign negative  BACK: No costovertebral tenderness  NEURO: Alert and oriented, nonfocal  PSYCHIATRIC: normal affect    LABS:  Lab Results   Component Value Date    WBC 8.6 08/07/2024    HGB 13.6 08/07/2024    HCT 39.4 08/07/2024    MCV 88 08/07/2024     08/07/2024     INR/Prothrombin Time  @LABRCNTIP(NA,K,CL,co2,bun,creatinine,labglom,glucose,calcium)@  Lab Results   Component Value Date    ALT 33 08/07/2024    AST 50 (H) 08/07/2024    ALKPHOS 178 (H) 08/07/2024     IMAGES:     CT 8/76/24:    HEPATOBILIARY: Liver and gallbladder appear normal.     US 8/7/24:    IMPRESSION:    1.  Biliary sludge. No cholelithiasis or evidence of acute cholecystitis.     2.  Mild diffuse hepatic steatosis.    Assessment/Plan: "     Pt with signs and symptoms consistent with biliary colic. I have recommended a laparoscopic cholecystecomy. I discussed with her that we might not be able to do this laparoscopically with low risk of going open. I went over some of the risks of surgery including but not limited to bleeding, infection, bile duct injury and anesthesia.       Discussed mild elevation of AST in setting of normal bile duct, normal bili, and steatosis on US, would not plan on IOC.    We also discussed new diagnosis of LBBB, she is seeing a cardiologist for evaluation next week.    She would like to think more about her decision and also see cardiologist prior to scheduling.  She will call my clinic when she'd like to move forward.    Syd Smith MD

## 2024-08-21 ENCOUNTER — OFFICE VISIT (OUTPATIENT)
Dept: CARDIOLOGY | Facility: CLINIC | Age: 69
End: 2024-08-21
Attending: NURSE PRACTITIONER
Payer: COMMERCIAL

## 2024-08-21 VITALS
HEIGHT: 62 IN | RESPIRATION RATE: 14 BRPM | WEIGHT: 199 LBS | HEART RATE: 92 BPM | SYSTOLIC BLOOD PRESSURE: 118 MMHG | BODY MASS INDEX: 36.62 KG/M2 | OXYGEN SATURATION: 96 % | DIASTOLIC BLOOD PRESSURE: 72 MMHG

## 2024-08-21 DIAGNOSIS — E78.5 HYPERLIPIDEMIA LDL GOAL <100: ICD-10-CM

## 2024-08-21 DIAGNOSIS — R06.09 DOE (DYSPNEA ON EXERTION): ICD-10-CM

## 2024-08-21 DIAGNOSIS — I10 PRIMARY HYPERTENSION: ICD-10-CM

## 2024-08-21 DIAGNOSIS — I44.7 LEFT BUNDLE BRANCH BLOCK: Primary | ICD-10-CM

## 2024-08-21 DIAGNOSIS — K21.00 GASTROESOPHAGEAL REFLUX DISEASE WITH ESOPHAGITIS WITHOUT HEMORRHAGE: ICD-10-CM

## 2024-08-21 PROCEDURE — 99204 OFFICE O/P NEW MOD 45 MIN: CPT | Performed by: STUDENT IN AN ORGANIZED HEALTH CARE EDUCATION/TRAINING PROGRAM

## 2024-08-21 RX ORDER — ATORVASTATIN CALCIUM 20 MG/1
TABLET, FILM COATED ORAL
Qty: 90 TABLET | Refills: 2 | Status: SHIPPED | OUTPATIENT
Start: 2024-08-21

## 2024-08-21 NOTE — LETTER
8/21/2024    Ho Quezada MD  [address] [facility] [address]    RE: Kandi Kim       Dear Colleague,     I had the pleasure of seeing Kandi Kim in the [facility].    [facility]   [address]  [place] [address]   [email or web address]   OFFICE: [phone]     CARDIOLOGY CLINIC NOTE     Thank you, Ho Camara, for asking the [facility] team to see Ms. Kandi Kim to evaluate New Patient          History of Present Illness   Ms. Kandi Kim is a 69 year old female with a significant past history of HTN, HLD, solitary kidney due to kidney donation, HONEY, who presents for evaluation of a newly found LBBB.  The patient notes she had an episode of severe abdominal pain which was attributed to symptomatic cholelithiasis. She had an ECG which showed LBBB which was new since February.  She notes increasing dyspnea on exertion over the last year or so.  She denies chest pain besides twinges now and again.  She does feel like she has to pace herself otherwise she gets winded.  Her father had a CABG in his 70s.  No other significant family heart history.             Medications  Allergies   Current Outpatient Medications   Medication Sig Dispense Refill     acetaminophen (TYLENOL) 325 MG tablet Take 2 tablets (650 mg) by mouth every 4 hours as needed for mild pain (Patient taking differently: Take 650 mg by mouth every 4 hours as needed for mild pain. Takes rarely) 50 tablet 0     atorvastatin (LIPITOR) 20 MG tablet TAKE 1 TABLET BY MOUTH EVERY DAY 90 tablet 2     citalopram (CELEXA) 20 MG tablet TAKE 1 TABLET BY MOUTH EVERY DAY 90 tablet 2     latanoprost (XALATAN) 0.005 % ophthalmic solution Place 1 drop into both eyes every evening       levothyroxine (SYNTHROID/LEVOTHROID) 125 MCG tablet Take 1 tablet (125 mcg) by mouth daily 90 tablet 3     lisinopril (ZESTRIL) 10 MG tablet  "TAKE 1 TABLET BY MOUTH EVERY DAY 90 tablet 3     omeprazole (PRILOSEC) 20 MG DR capsule TAKE 1 CAPSULE BY MOUTH EVERY DAY 90 capsule 2     Probiotic Product (FORTIFY PROBIOTIC WOMENS PO) Take 1 capsule by mouth daily.       amoxicillin (AMOXIL) 500 MG capsule TAKE 4 CAPSULES (2,000 MG) BY MOUTH ONCE FOR 1 DOSE PRIOR TO DENTIST VISITS 4 capsule 3      Allergies   Allergen Reactions     Synvisc [Hylan G-F 20]         Physical Examination Review of Systems   Vitals: /72 (BP Location: Left arm, Patient Position: Sitting, Cuff Size: Adult Large)   Pulse 92   Resp 14   Ht 1.568 m (5' 1.75\")   Wt 90.3 kg (199 lb)   LMP  (LMP Unknown)   SpO2 96%   BMI 36.69 kg/m    BMI= Body mass index is 36.69 kg/m .  Wt Readings from Last 3 Encounters:   08/21/24 90.3 kg (199 lb)   08/12/24 90.3 kg (199 lb)   05/20/24 90.6 kg (199 lb 11.2 oz)       General: pleasant female. No acute distress.   Neck: No JVD  Lungs: clear to auscultation  COR:  regular rate and rhythm, No murmurs, rubs, or gallops  Extrem: No edema        Please refer above for cardiac ROS details.       Past History     Family History:   Family History   Problem Relation Age of Onset     Cerebrovascular Disease Mother      Breast Cancer Sister 62.00     Breast Cancer Cousin         age in 50's        Social History:   Social History     Socioeconomic History     Marital status:      Spouse name: Not on file     Number of children: Not on file     Years of education: Not on file     Highest education level: Not on file   Occupational History     Not on file   Tobacco Use     Smoking status: Never     Passive exposure: Never     Smokeless tobacco: Never   Vaping Use     Vaping status: Never Used   Substance and Sexual Activity     Alcohol use: Yes     Comment: Alcoholic Drinks/day: infrequent     Drug use: Never     Sexual activity: Not on file   Other Topics Concern     Not on file   Social History Narrative    , [place]   1 child  3M  Retired "     Social Determinants of Health     Financial Resource Strain: Low Risk  (1/25/2024)    Financial Resource Strain      Within the past 12 months, have you or your family members you live with been unable to get utilities (heat, electricity) when it was really needed?: No   Food Insecurity: Low Risk  (1/25/2024)    Food Insecurity      Within the past 12 months, did you worry that your food would run out before you got money to buy more?: No      Within the past 12 months, did the food you bought just not last and you didn t have money to get more?: No   Transportation Needs: Low Risk  (1/25/2024)    Transportation Needs      Within the past 12 months, has lack of transportation kept you from medical appointments, getting your medicines, non-medical meetings or appointments, work, or from getting things that you need?: No   Physical Activity: Not on file   Stress: Not on file   Social Connections: Not on file   Interpersonal Safety: Low Risk  (4/22/2024)    Interpersonal Safety      Do you feel physically and emotionally safe where you currently live?: Yes      Within the past 12 months, have you been hit, slapped, kicked or otherwise physically hurt by someone?: No      Within the past 12 months, have you been humiliated or emotionally abused in other ways by your partner or ex-partner?: No   Housing Stability: Low Risk  (1/25/2024)    Housing Stability      Do you have housing? : Yes      Are you worried about losing your housing?: No            Lab Results    Chemistry/lipid CBC Cardiac Enzymes/BNP/TSH/INR   Lab Results   Component Value Date    CHOL 217 (H) 09/26/2023    HDL 48 (L) 09/26/2023    TRIG 205 (H) 09/26/2023    BUN 23.2 (H) 08/07/2024     08/07/2024    CO2 20 (L) 08/07/2024    Lab Results   Component Value Date    WBC 8.6 08/07/2024    HGB 13.6 08/07/2024    HCT 39.4 08/07/2024    MCV 88 08/07/2024     08/07/2024    Lab Results   Component Value Date    TROPONINI <0.01 02/13/2022    TSH  1.03 01/25/2024    INR 0.95 09/26/2023          Cardiac Problems and Cardiac Diagnostics     Most Recent Cardiac testing:  ECG Personal interpretation  8/7/2024  NSR  LBBB           Assessment/Recommendations   Assessment:    Ms. Kandi Kim is a 69 year old female with a significant past history of HTN, HLD, solitary kidney due to kidney donation, HONEY, who presents for evaluation of a newly found LBBB.     LBBB   - Unclear etiology, could be age-related degeneration vs. ischemia   - Some concern for ischemia given LEOS symptoms .  Plan on evaluation with an echocardiogram and a NM vasodilator stress.   - If workup is unremarkable then no specific follow up testing is needed unless there is a change in symptoms.    HTN   - Well controlled   - Continue current management    RTC depending on testing         Hemanth Painting DO [facility]  Non-invasive Cardiologist  [facility]         Thank you for allowing me to participate in the care of your patient.      Sincerely,     Hemanth Painting DO     [facility]  cc:   ROSE Malave CNP  [address] [place] [address]

## 2024-08-21 NOTE — PATIENT INSTRUCTIONS
It was a pleasure meeting you today    In summary:    We will get an echocardiogram and a nuclear stress test     Please call my nurse Sreekanth Flores at [phone] if you have any questions or issues.    We will schedule a follow up visit in the future if needed      Hemanth Painting DO [facility]  Non-invasive Cardiologist  [facility]

## 2024-08-21 NOTE — PROGRESS NOTES
[facility] HEART CARE   [address]  [place] [address]   [email or web address]   OFFICE: [phone]     CARDIOLOGY CLINIC NOTE     Thank you, Ho Camara, for asking the [facility] Heart Care team to see Ms. Kandi Kim to evaluate New Patient          History of Present Illness   Ms. Kandi Kim is a 69 year old female with a significant past history of HTN, HLD, solitary kidney due to kidney donation, HONEY, who presents for evaluation of a newly found LBBB.  The patient notes she had an episode of severe abdominal pain which was attributed to symptomatic cholelithiasis. She had an ECG which showed LBBB which was new since February.  She notes increasing dyspnea on exertion over the last year or so.  She denies chest pain besides twinges now and again.  She does feel like she has to pace herself otherwise she gets winded.  Her father had a CABG in his 70s.  No other significant family heart history.             Medications  Allergies   Current Outpatient Medications   Medication Sig Dispense Refill    acetaminophen (TYLENOL) 325 MG tablet Take 2 tablets (650 mg) by mouth every 4 hours as needed for mild pain (Patient taking differently: Take 650 mg by mouth every 4 hours as needed for mild pain. Takes rarely) 50 tablet 0    atorvastatin (LIPITOR) 20 MG tablet TAKE 1 TABLET BY MOUTH EVERY DAY 90 tablet 2    citalopram (CELEXA) 20 MG tablet TAKE 1 TABLET BY MOUTH EVERY DAY 90 tablet 2    latanoprost (XALATAN) 0.005 % ophthalmic solution Place 1 drop into both eyes every evening      levothyroxine (SYNTHROID/LEVOTHROID) 125 MCG tablet Take 1 tablet (125 mcg) by mouth daily 90 tablet 3    lisinopril (ZESTRIL) 10 MG tablet TAKE 1 TABLET BY MOUTH EVERY DAY 90 tablet 3    omeprazole (PRILOSEC) 20 MG DR capsule TAKE 1 CAPSULE BY MOUTH EVERY DAY 90 capsule 2    Probiotic Product (FORTIFY PROBIOTIC WOMENS PO) Take 1 capsule by mouth daily.       "amoxicillin (AMOXIL) 500 MG capsule TAKE 4 CAPSULES (2,000 MG) BY MOUTH ONCE FOR 1 DOSE PRIOR TO DENTIST VISITS 4 capsule 3      Allergies   Allergen Reactions    Synvisc [Hylan G-F 20]         Physical Examination Review of Systems   Vitals: /72 (BP Location: Left arm, Patient Position: Sitting, Cuff Size: Adult Large)   Pulse 92   Resp 14   Ht 1.568 m (5' 1.75\")   Wt 90.3 kg (199 lb)   LMP  (LMP Unknown)   SpO2 96%   BMI 36.69 kg/m    BMI= Body mass index is 36.69 kg/m .  Wt Readings from Last 3 Encounters:   08/21/24 90.3 kg (199 lb)   08/12/24 90.3 kg (199 lb)   05/20/24 90.6 kg (199 lb 11.2 oz)       General: pleasant female. No acute distress.   Neck: No JVD  Lungs: clear to auscultation  COR:  regular rate and rhythm, No murmurs, rubs, or gallops  Extrem: No edema        Please refer above for cardiac ROS details.       Past History     Family History:   Family History   Problem Relation Age of Onset    Cerebrovascular Disease Mother     Breast Cancer Sister 62.00    Breast Cancer Cousin         age in 50's        Social History:   Social History     Socioeconomic History    Marital status:      Spouse name: Not on file    Number of children: Not on file    Years of education: Not on file    Highest education level: Not on file   Occupational History    Not on file   Tobacco Use    Smoking status: Never     Passive exposure: Never    Smokeless tobacco: Never   Vaping Use    Vaping status: Never Used   Substance and Sexual Activity    Alcohol use: Yes     Comment: Alcoholic Drinks/day: infrequent    Drug use: Never    Sexual activity: Not on file   Other Topics Concern    Not on file   Social History Narrative    , [place]   1 child  3M  Retired     Social Determinants of Health     Financial Resource Strain: Low Risk  (1/25/2024)    Financial Resource Strain     Within the past 12 months, have you or your family members you live with been unable to get utilities (heat, electricity) " when it was really needed?: No   Food Insecurity: Low Risk  (1/25/2024)    Food Insecurity     Within the past 12 months, did you worry that your food would run out before you got money to buy more?: No     Within the past 12 months, did the food you bought just not last and you didn t have money to get more?: No   Transportation Needs: Low Risk  (1/25/2024)    Transportation Needs     Within the past 12 months, has lack of transportation kept you from medical appointments, getting your medicines, non-medical meetings or appointments, work, or from getting things that you need?: No   Physical Activity: Not on file   Stress: Not on file   Social Connections: Not on file   Interpersonal Safety: Low Risk  (4/22/2024)    Interpersonal Safety     Do you feel physically and emotionally safe where you currently live?: Yes     Within the past 12 months, have you been hit, slapped, kicked or otherwise physically hurt by someone?: No     Within the past 12 months, have you been humiliated or emotionally abused in other ways by your partner or ex-partner?: No   Housing Stability: Low Risk  (1/25/2024)    Housing Stability     Do you have housing? : Yes     Are you worried about losing your housing?: No            Lab Results    Chemistry/lipid CBC Cardiac Enzymes/BNP/TSH/INR   Lab Results   Component Value Date    CHOL 217 (H) 09/26/2023    HDL 48 (L) 09/26/2023    TRIG 205 (H) 09/26/2023    BUN 23.2 (H) 08/07/2024     08/07/2024    CO2 20 (L) 08/07/2024    Lab Results   Component Value Date    WBC 8.6 08/07/2024    HGB 13.6 08/07/2024    HCT 39.4 08/07/2024    MCV 88 08/07/2024     08/07/2024    Lab Results   Component Value Date    TROPONINI <0.01 02/13/2022    TSH 1.03 01/25/2024    INR 0.95 09/26/2023          Cardiac Problems and Cardiac Diagnostics     Most Recent Cardiac testing:  ECG Personal interpretation  8/7/2024  NSR  LBBB           Assessment/Recommendations   Assessment:    Ms. Kandi Kim  is a 69 year old female with a significant past history of HTN, HLD, solitary kidney due to kidney donation, HONEY, who presents for evaluation of a newly found LBBB.     LBBB   - Unclear etiology, could be age-related degeneration vs. ischemia   - Some concern for ischemia given LEOS symptoms .  Plan on evaluation with an echocardiogram and a NM vasodilator stress.   - If workup is unremarkable then no specific follow up testing is needed unless there is a change in symptoms.    HTN   - Well controlled   - Continue current management    RTC depending on testing         Hemanth Painting DO [facility] Health ServicesDENG  Non-invasive Cardiologist  [facility]

## 2024-08-30 DIAGNOSIS — E03.9 HYPOTHYROIDISM, UNSPECIFIED TYPE: ICD-10-CM

## 2024-08-30 RX ORDER — LEVOTHYROXINE SODIUM 125 UG/1
125 TABLET ORAL DAILY
Qty: 90 TABLET | Refills: 2 | Status: SHIPPED | OUTPATIENT
Start: 2024-08-30

## 2024-09-05 ENCOUNTER — HOSPITAL ENCOUNTER (OUTPATIENT)
Dept: NUCLEAR MEDICINE | Facility: CLINIC | Age: 69
Discharge: HOME | End: 2024-09-05
Attending: STUDENT IN AN ORGANIZED HEALTH CARE EDUCATION/TRAINING PROGRAM
Payer: COMMERCIAL

## 2024-09-05 ENCOUNTER — HOSPITAL ENCOUNTER (OUTPATIENT)
Dept: CARDIOLOGY | Facility: CLINIC | Age: 69
Discharge: HOME | End: 2024-09-05
Attending: STUDENT IN AN ORGANIZED HEALTH CARE EDUCATION/TRAINING PROGRAM
Payer: COMMERCIAL

## 2024-09-05 DIAGNOSIS — I44.7 LEFT BUNDLE BRANCH BLOCK: ICD-10-CM

## 2024-09-05 DIAGNOSIS — R06.09 DOE (DYSPNEA ON EXERTION): ICD-10-CM

## 2024-09-05 LAB
BI-PLANE LVEF ECHO: NORMAL
CV STRESS CURRENT BP HE: NORMAL
CV STRESS CURRENT HR HE: 111
CV STRESS CURRENT HR HE: 113
CV STRESS CURRENT HR HE: 118
CV STRESS CURRENT HR HE: 58
CV STRESS CURRENT HR HE: 62
CV STRESS CURRENT HR HE: 65
CV STRESS CURRENT HR HE: 74
CV STRESS CURRENT HR HE: 74
CV STRESS CURRENT HR HE: 75
CV STRESS CURRENT HR HE: 78
CV STRESS CURRENT HR HE: 80
CV STRESS CURRENT HR HE: 88
CV STRESS CURRENT HR HE: 98
CV STRESS CURRENT HR HE: 99
CV STRESS CURRENT HR HE: 99
CV STRESS DEVIATION TIME HE: NORMAL
CV STRESS ECHO PERCENT HR HE: NORMAL
CV STRESS EXERCISE STAGE HE: NORMAL
CV STRESS FINAL RESTING BP HE: NORMAL
CV STRESS FINAL RESTING HR HE: 74
CV STRESS MAX HR HE: 118
CV STRESS MAX TREADMILL GRADE HE: 0
CV STRESS MAX TREADMILL SPEED HE: 0
CV STRESS PEAK DIA BP HE: NORMAL
CV STRESS PEAK SYS BP HE: NORMAL
CV STRESS PHASE HE: NORMAL
CV STRESS PROTOCOL HE: NORMAL
CV STRESS RESTING PT POSITION HE: NORMAL
CV STRESS ST DEVIATION AMOUNT HE: NORMAL
CV STRESS ST DEVIATION ELEVATION HE: NORMAL
CV STRESS ST EVELATION AMOUNT HE: NORMAL
CV STRESS TEST TYPE HE: NORMAL
CV STRESS TOTAL STAGE TIME MIN 1 HE: NORMAL
RATE PRESSURE PRODUCT: NORMAL
STRESS ECHO BASELINE DIASTOLIC HE: 90
STRESS ECHO BASELINE HR: 64
STRESS ECHO BASELINE SYSTOLIC BP: 138
STRESS ECHO CALCULATED PERCENT HR: 77 %
STRESS ECHO LAST STRESS DIASTOLIC BP: 81
STRESS ECHO LAST STRESS HR: 99
STRESS ECHO LAST STRESS SYSTOLIC BP: 165
STRESS ECHO TARGET HR: 153

## 2024-09-05 PROCEDURE — 93016 CV STRESS TEST SUPVJ ONLY: CPT | Performed by: STUDENT IN AN ORGANIZED HEALTH CARE EDUCATION/TRAINING PROGRAM

## 2024-09-05 PROCEDURE — 343N000001 HC RX 343: Performed by: STUDENT IN AN ORGANIZED HEALTH CARE EDUCATION/TRAINING PROGRAM

## 2024-09-05 PROCEDURE — 78452 HT MUSCLE IMAGE SPECT MULT: CPT

## 2024-09-05 PROCEDURE — 93018 CV STRESS TEST I&R ONLY: CPT | Performed by: INTERNAL MEDICINE

## 2024-09-05 PROCEDURE — 78452 HT MUSCLE IMAGE SPECT MULT: CPT | Mod: 26 | Performed by: INTERNAL MEDICINE

## 2024-09-05 PROCEDURE — 93306 TTE W/DOPPLER COMPLETE: CPT

## 2024-09-05 PROCEDURE — A9500 TC99M SESTAMIBI: HCPCS | Performed by: STUDENT IN AN ORGANIZED HEALTH CARE EDUCATION/TRAINING PROGRAM

## 2024-09-05 PROCEDURE — 250N000011 HC RX IP 250 OP 636: Performed by: STUDENT IN AN ORGANIZED HEALTH CARE EDUCATION/TRAINING PROGRAM

## 2024-09-05 PROCEDURE — 93306 TTE W/DOPPLER COMPLETE: CPT | Mod: 26 | Performed by: INTERNAL MEDICINE

## 2024-09-05 PROCEDURE — 93017 CV STRESS TEST TRACING ONLY: CPT

## 2024-09-05 RX ORDER — REGADENOSON 0.08 MG/ML
0.4 INJECTION, SOLUTION INTRAVENOUS ONCE
Status: COMPLETED | OUTPATIENT
Start: 2024-09-05 | End: 2024-09-05

## 2024-09-05 RX ORDER — AMINOPHYLLINE 25 MG/ML
50-100 INJECTION, SOLUTION INTRAVENOUS
Status: DISCONTINUED | OUTPATIENT
Start: 2024-09-05 | End: 2024-09-06 | Stop reason: HOSPADM

## 2024-09-05 RX ADMIN — REGADENOSON 0.4 MG: 0.08 INJECTION, SOLUTION INTRAVENOUS at 10:18

## 2024-09-05 RX ADMIN — Medication 8.7 MILLICURIE: at 09:42

## 2024-09-05 RX ADMIN — Medication 32.1 MILLICURIE: at 10:30

## 2024-09-13 ENCOUNTER — APPOINTMENT (OUTPATIENT)
Dept: ULTRASOUND IMAGING | Facility: CLINIC | Age: 69
End: 2024-09-13
Attending: EMERGENCY MEDICINE
Payer: COMMERCIAL

## 2024-09-13 PROCEDURE — 99285 EMERGENCY DEPT VISIT HI MDM: CPT | Mod: 25

## 2024-09-13 PROCEDURE — 76705 ECHO EXAM OF ABDOMEN: CPT

## 2024-09-13 RX ORDER — ONDANSETRON 2 MG/ML
4 INJECTION INTRAMUSCULAR; INTRAVENOUS ONCE
Status: COMPLETED | OUTPATIENT
Start: 2024-09-13 | End: 2024-09-14

## 2024-09-13 ASSESSMENT — COLUMBIA-SUICIDE SEVERITY RATING SCALE - C-SSRS
1. IN THE PAST MONTH, HAVE YOU WISHED YOU WERE DEAD OR WISHED YOU COULD GO TO SLEEP AND NOT WAKE UP?: NO
2. HAVE YOU ACTUALLY HAD ANY THOUGHTS OF KILLING YOURSELF IN THE PAST MONTH?: NO
6. HAVE YOU EVER DONE ANYTHING, STARTED TO DO ANYTHING, OR PREPARED TO DO ANYTHING TO END YOUR LIFE?: NO

## 2024-09-13 ASSESSMENT — ENCOUNTER SYMPTOMS
ABDOMINAL PAIN: 1
FEVER: 0
VOMITING: 0
CHILLS: 0
NAUSEA: 1

## 2024-09-14 ENCOUNTER — HOSPITAL ENCOUNTER (EMERGENCY)
Facility: CLINIC | Age: 69
Discharge: HOME | End: 2024-09-14
Attending: EMERGENCY MEDICINE | Admitting: EMERGENCY MEDICINE
Payer: COMMERCIAL

## 2024-09-14 VITALS
SYSTOLIC BLOOD PRESSURE: 123 MMHG | HEART RATE: 87 BPM | HEIGHT: 62 IN | RESPIRATION RATE: 15 BRPM | WEIGHT: 190 LBS | BODY MASS INDEX: 34.96 KG/M2 | OXYGEN SATURATION: 96 % | DIASTOLIC BLOOD PRESSURE: 72 MMHG | TEMPERATURE: 98.9 F

## 2024-09-14 DIAGNOSIS — K80.50 BILIARY COLIC: ICD-10-CM

## 2024-09-14 DIAGNOSIS — R79.89 ELEVATED LFTS: ICD-10-CM

## 2024-09-14 LAB
ALBUMIN SERPL BCG-MCNC: 4.3 G/DL (ref 3.5–5.2)
ALP SERPL-CCNC: 246 U/L (ref 40–150)
ALT SERPL W P-5'-P-CCNC: 107 U/L (ref 0–50)
ANION GAP SERPL CALCULATED.3IONS-SCNC: 15 MMOL/L (ref 7–15)
AST SERPL W P-5'-P-CCNC: 180 U/L (ref 0–45)
BASOPHILS # BLD AUTO: 0 10E3/UL (ref 0–0.2)
BASOPHILS NFR BLD AUTO: 0 %
BILIRUB SERPL-MCNC: 0.8 MG/DL
BUN SERPL-MCNC: 15 MG/DL (ref 8–23)
CALCIUM SERPL-MCNC: 9.4 MG/DL (ref 8.8–10.4)
CHLORIDE SERPL-SCNC: 100 MMOL/L (ref 98–107)
CREAT SERPL-MCNC: 1.01 MG/DL (ref 0.51–0.95)
EGFRCR SERPLBLD CKD-EPI 2021: 61 ML/MIN/1.73M2
EOSINOPHIL # BLD AUTO: 0.1 10E3/UL (ref 0–0.7)
EOSINOPHIL NFR BLD AUTO: 1 %
ERYTHROCYTE [DISTWIDTH] IN BLOOD BY AUTOMATED COUNT: 11.9 % (ref 10–15)
GLUCOSE SERPL-MCNC: 119 MG/DL (ref 70–99)
HCO3 SERPL-SCNC: 24 MMOL/L (ref 22–29)
HCT VFR BLD AUTO: 39.8 % (ref 35–47)
HGB BLD-MCNC: 13.6 G/DL (ref 11.7–15.7)
IMM GRANULOCYTES # BLD: 0 10E3/UL
IMM GRANULOCYTES NFR BLD: 0 %
LIPASE SERPL-CCNC: 51 U/L (ref 13–60)
LYMPHOCYTES # BLD AUTO: 1.2 10E3/UL (ref 0.8–5.3)
LYMPHOCYTES NFR BLD AUTO: 11 %
MCH RBC QN AUTO: 29.6 PG (ref 26.5–33)
MCHC RBC AUTO-ENTMCNC: 34.2 G/DL (ref 31.5–36.5)
MCV RBC AUTO: 87 FL (ref 78–100)
MONOCYTES # BLD AUTO: 0.7 10E3/UL (ref 0–1.3)
MONOCYTES NFR BLD AUTO: 7 %
NEUTROPHILS # BLD AUTO: 8.1 10E3/UL (ref 1.6–8.3)
NEUTROPHILS NFR BLD AUTO: 80 %
NRBC # BLD AUTO: 0 10E3/UL
NRBC BLD AUTO-RTO: 0 /100
PLATELET # BLD AUTO: 262 10E3/UL (ref 150–450)
POTASSIUM SERPL-SCNC: 4 MMOL/L (ref 3.4–5.3)
PROT SERPL-MCNC: 7 G/DL (ref 6.4–8.3)
RBC # BLD AUTO: 4.59 10E6/UL (ref 3.8–5.2)
SODIUM SERPL-SCNC: 139 MMOL/L (ref 135–145)
WBC # BLD AUTO: 10.2 10E3/UL (ref 4–11)

## 2024-09-14 PROCEDURE — 80053 COMPREHEN METABOLIC PANEL: CPT | Performed by: EMERGENCY MEDICINE

## 2024-09-14 PROCEDURE — 85025 COMPLETE CBC W/AUTO DIFF WBC: CPT | Performed by: EMERGENCY MEDICINE

## 2024-09-14 PROCEDURE — 250N000011 HC RX IP 250 OP 636: Performed by: EMERGENCY MEDICINE

## 2024-09-14 PROCEDURE — 96375 TX/PRO/DX INJ NEW DRUG ADDON: CPT

## 2024-09-14 PROCEDURE — 250N000009 HC RX 250: Performed by: EMERGENCY MEDICINE

## 2024-09-14 PROCEDURE — 96374 THER/PROPH/DIAG INJ IV PUSH: CPT

## 2024-09-14 PROCEDURE — 83690 ASSAY OF LIPASE: CPT | Performed by: EMERGENCY MEDICINE

## 2024-09-14 PROCEDURE — 36415 COLL VENOUS BLD VENIPUNCTURE: CPT | Performed by: EMERGENCY MEDICINE

## 2024-09-14 RX ORDER — ONDANSETRON 8 MG/1
8 TABLET, ORALLY DISINTEGRATING ORAL EVERY 8 HOURS PRN
Qty: 12 TABLET | Refills: 0 | Status: SHIPPED | OUTPATIENT
Start: 2024-09-14

## 2024-09-14 RX ORDER — OXYCODONE HYDROCHLORIDE 5 MG/1
5 TABLET ORAL EVERY 6 HOURS PRN
Qty: 10 TABLET | Refills: 0 | Status: SHIPPED | OUTPATIENT
Start: 2024-09-14 | End: 2024-09-17

## 2024-09-14 RX ADMIN — HYDROMORPHONE HYDROCHLORIDE 1 MG: 1 INJECTION, SOLUTION INTRAMUSCULAR; INTRAVENOUS; SUBCUTANEOUS at 01:04

## 2024-09-14 RX ADMIN — PANTOPRAZOLE SODIUM 40 MG: 40 INJECTION, POWDER, FOR SOLUTION INTRAVENOUS at 01:04

## 2024-09-14 RX ADMIN — ONDANSETRON 4 MG: 2 INJECTION INTRAMUSCULAR; INTRAVENOUS at 01:03

## 2024-09-14 ASSESSMENT — ACTIVITIES OF DAILY LIVING (ADL): ADLS_ACUITY_SCORE: 35

## 2024-09-14 NOTE — DISCHARGE INSTRUCTIONS
With your elevated liver enzymes, please do not take Tylenol for pain.  Make sure that you follow-up with surgery on Monday morning.  Also please make sure that if you are pain in any way returns or gets worse or you are feeling more nauseated that you return to the emergency department for reevaluation.  As we discussed the elevated liver enzymes could be indicative of an early infected gallbladder.  If your symptoms continue please return for reevaluation.

## 2024-09-14 NOTE — ED TRIAGE NOTES
Patient arrives to the ER with c/o abdominal pain and nausea. Has known gall bladder issues with recommendation to remove gall bladder. Symptoms started about an hour ago.      Triage Assessment (Adult)       Row Name 09/13/24 8833          Triage Assessment    Airway WDL WDL        Respiratory WDL    Respiratory WDL WDL        Skin Circulation/Temperature WDL    Skin Circulation/Temperature WDL WDL        Cardiac WDL    Cardiac WDL WDL        Peripheral/Neurovascular WDL    Peripheral Neurovascular WDL WDL        Cognitive/Neuro/Behavioral WDL    Cognitive/Neuro/Behavioral WDL WDL

## 2024-09-14 NOTE — ED PROVIDER NOTES
EMERGENCY DEPARTMENT ENCOUNTER      NAME: Kandi Kim  AGE: 69 year old female  YOB: 1955  MRN: [id number]  EVALUATION DATE & TIME: No admission date for patient encounter.    PCP: Ho Quezada    ED PROVIDER: Mayte Lua M.D.      Chief Complaint   Patient presents with    Abdominal Pain    Nausea & Vomiting         FINAL IMPRESSION:  1. Elevated LFTs    2. Biliary colic        MEDICAL DECISION MAKING:    Pertinent Labs & Imaging studies reviewed. (See chart for details)  ED Course as of 09/14/24 0149   Fri Sep 13, 2024   2319 Afebrile.  Vital signs here with hypertension.  Patient coming in for evaluation of upper abdominal pain.  Has history of gallbladder issues with known sludge.  Was recommended about a month ago she had her gallbladder removed however was noted she had a left bundle branch block at that time and so they deferred for cardiac clearance.  States she has been doing well.  Pain here started about an hour and a half prior to arrival.  Primarily epigastric, radiates across to her abdomen at the top.  Similar to previous episodes.  No fevers chills.    Physical exam for patient here appears mildly uncomfortable.  She has epigastric abdominal discomfort as well as right lower quadrant pain.  Otherwise unremarkable.    Plan for ultrasound, labs, pain control, nausea medication.   2321 Reviewed note from general surgery 8/12/2024.  Reviewed cardiology note 8/21/2024.   Sat Sep 14, 2024   0145 Patient's labs back here do show worsening elevation of her LFTs.  Alkaline phosphatase elevated, AST and ALT both elevated.  Ultrasound here shows tumefactive sludge but no evidence for acute cholecystitis.  After medications she is feeling very much improved.  Able to tolerate p.o.  I discussed with her and offered consultation with surgery given her elevated LFTs and this sludge that she is having but at this point she would like to go and follow-up with the surgeon that she is  already made contact with Dr. Smith.  I did check, Dr. Cannon is the 1 that is on-call today.  I did offer to call but she states she would like to go and call his office and follow-up with him on Monday.  I did tell her that it is possible given these worsening LFTs that she is developing early cholecystitis and if her symptoms continue until tomorrow she will need to likely come back to the emergency department and have this done more urgently with whoever is on-call.  She understands this.  Plan for discharge home with pain medication, antinausea medication and close follow-up with surgery.         Medical Decision Making  Obtained supplemental history:Supplemental history obtained?: No  Reviewed external records: External records reviewed?: Documented in chart  Care impacted by chronic illness:Hyperlipidemia, Hypertension, and Other: GERD  Care significantly affected by social determinants of health:Access to Medical Care  Did you consider but not order tests?: Work up considered but not performed and documented in chart, if applicable  Did you interpret images independently?: Independent interpretation of ECG and images noted in documentation, when applicable.  Consultation discussion with other provider:Did you involve another provider (consultant, , pharmacy, etc.)?: No  Discharge. I prescribed additional prescription strength medication(s) as charted. I recommended admission, but the patient declined.  Not Applicable        Critical care: 00 minutes excluding separately billable procedures.  Includes bedside management, time reviewing test results, review of records, discussing the case with staff, documenting the medical record and time spent with family members (or surrogate decision makers) discussing specific treatment issues.          ED COURSE:  11:10 PM I met with the patient, obtained history, performed an initial exam, and discussed options and plan for diagnostics and treatment here in the ED.    1:44 AM I updated the patient on their results.    The importance of close follow up was discussed. We reviewed warning signs and symptoms, and I instructed Ms. Kim to return to the emergency department immediately if she develops any new or worsening symptoms. I provided additional verbal discharge instructions. Ms. Kim expressed understanding and agreement with this plan of care, her questions were answered, and she was discharged in stable condition.     MEDICATIONS GIVEN IN THE EMERGENCY:  Medications   HYDROmorphone (DILAUDID) injection 1 mg (1 mg Intravenous $Given 9/14/24 0104)   ondansetron (ZOFRAN) injection 4 mg (4 mg Intravenous $Given 9/14/24 0103)   pantoprazole (PROTONIX) IV push injection 40 mg (40 mg Intravenous $Given 9/14/24 0104)       NEW PRESCRIPTIONS STARTED AT TODAY'S ER VISIT:  New Prescriptions    ONDANSETRON (ZOFRAN ODT) 8 MG ODT TAB    Take 1 tablet (8 mg) by mouth every 8 hours as needed for nausea.    OXYCODONE (ROXICODONE) 5 MG TABLET    Take 1 tablet (5 mg) by mouth every 6 hours as needed for pain.          =================================================================    HPI    Patient information was obtained from: The patient    Use of : N/A         Kandi Kim is a 69 year old female who presents with abdominal pain, nausea, and vomiting.    The patient developed epigastric pain around 2 hours prior to arrival. She is nauseous but denies any vomiting episodes. She reports her pain has significantly worsened since onset. She reports a history of gallbladder attack last month and was found to have sludge in her gallbladder last month. There was discussion with general surgeon at that time in the ER regarding cholecystectomy, but was deferred for cardiac clearance due to a new finding of left bundle branch block.   No complaints of fevers, chills, vomiting, or any other associated symptoms at this time.    Per chart review,    08/07/2024 -  The patient was seen at [facility] ED for evaluation of epigastric pain. EKG showed normal sinus rhythm with a new left bundle branch block and left axis deviation noted. CMP revealed no acute electrolyte derangement, creatinine was within patient's normal baseline. AST and Niall phosphatase was mildly elevated. D-dimer was elevated as well. Lipase was normal. US abdomen found biliary sludge in gallbladder, but no shadowing gallstones. CT Chest PE showed no acute pulmonary findings or acute findings in the abdomen or pelvis. Patient was discharged home with a referral for cardiology.    REVIEW OF SYSTEMS   Review of Systems   Constitutional:  Negative for chills and fever.   Gastrointestinal:  Positive for abdominal pain (epigastric pain) and nausea. Negative for vomiting.   All other systems reviewed and are negative.        PAST MEDICAL HISTORY:  Past Medical History:   Diagnosis Date    Acute torn meniscus of knee, left, initial encounter 02/08/2018    Acute torn meniscus of knee, left, sequela 05/14/2019    Balance problems 12/01/2023    Bilateral hearing loss 03/26/2021    Hearing aids    Chest pain 03/14/2022    Chronic foot pain, left 04/22/2024    Chronic left shoulder pain 05/03/2021    Chronic sinusitis     Class 2 severe obesity with serious comorbidity and body mass index (BMI) of 37.0 to 37.9 in adult (H) 07/21/2022    Diverticulitis 08/09/2016    CT scan normal    Epiploic appendagitis 01/25/2024    GERD with esophagitis     Complaints of dysphagia with EGD showing esophagitis.  Negative for eosinophilic esophagitis September 2019    History of colon polyps     Colonoscopy December 2018 with multiple polyps.  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  Added automatically from request for surgery [id number]    Influenza A 03/14/2022    Migraines     Open-angle glaucoma of both eyes 07/21/2022    HONEY (obstructive sleep apnea)     Osteoarthritis     knees, hands    PONV (postoperative nausea and vomiting)     Postmenopausal symptoms     Primary osteoarthritis involving multiple joints 07/21/2022    Bilateral TKA and chronic pain involving left ankle and right shoulder    Primary osteoarthritis of both knees 03/26/2021    Bilateral TKA    Primary osteoarthritis of right knee 04/12/2016    R TKA    Rectal bleeding 08/09/2016    Rectocele 11/11/2016    Surgery is planned    Screening     DEXA normal 2016 and normal 2022    Situational anxiety     Status post total bilateral knee replacement 03/14/2022    Tear of right rotator cuff 06/22/2023    Shoulder surg 2023       PAST SURGICAL HISTORY:  Past Surgical History:   Procedure Laterality Date    ARTHRODESIS ANKLE Left 5/20/2024    Procedure: LEFT TALONAVICULAR FUSION;  Surgeon: Nila Tyler MD;  Location:  OR    ARTHROPLASTY KNEE Right 04/01/2016    ARTHROPLASTY KNEE Left     jan 2020    ARTHROSCOPY KNEE Bilateral 2011 2014    BUNIONECTOMY      BUNIONECTOMY Left 5/20/2024    Procedure: AND LEFT BUNION REPAIR;  Surgeon: Nila Tyler MD;  Location:  OR    COLPORRHAPHY N/A 02/21/2017    Procedure: POSTERIOR COLPORRHAPHY;  Surgeon: Roxana Frias MD;  Location: [facility];  Service:     HYSTERECTOMY VAGINAL N/A 02/21/2017    Procedure: TOTAL VAGINAL HYSTERECTOMY, BILATERAL SALPINGO-OOPHORECTOMY;  Surgeon: Roxana Frias MD;  Location: [facility];  Service:     MIDDLE EAR SURGERY      NEPHRECTOMY Left     Kidney donor    OOPHORECTOMY      RECESSION GASTROCNEMIUS Left 5/20/2024    Procedure: AND LEFT GASTROC SLIDE;  Surgeon: Nila Tyler MD;  Location:  OR    SHOULDER ARTHROSCOPY W/ ROTATOR CUFF REPAIR Right     TUBAL LIGATION         CURRENT  MEDICATIONS:    No current facility-administered medications for this encounter.    Current Outpatient Medications:     ondansetron (ZOFRAN ODT) 8 MG ODT tab, Take 1 tablet (8 mg) by mouth every 8 hours as needed for nausea., Disp: 12 tablet, Rfl: 0    oxyCODONE (ROXICODONE) 5 MG tablet, Take 1 tablet (5 mg) by mouth every 6 hours as needed for pain., Disp: 10 tablet, Rfl: 0    acetaminophen (TYLENOL) 325 MG tablet, Take 2 tablets (650 mg) by mouth every 4 hours as needed for mild pain (Patient taking differently: Take 650 mg by mouth every 4 hours as needed for mild pain. Takes rarely), Disp: 50 tablet, Rfl: 0    amoxicillin (AMOXIL) 500 MG capsule, TAKE 4 CAPSULES (2,000 MG) BY MOUTH ONCE FOR 1 DOSE PRIOR TO DENTIST VISITS, Disp: 4 capsule, Rfl: 3    atorvastatin (LIPITOR) 20 MG tablet, TAKE 1 TABLET BY MOUTH EVERY DAY, Disp: 90 tablet, Rfl: 2    citalopram (CELEXA) 20 MG tablet, TAKE 1 TABLET BY MOUTH EVERY DAY, Disp: 90 tablet, Rfl: 2    latanoprost (XALATAN) 0.005 % ophthalmic solution, Place 1 drop into both eyes every evening, Disp: , Rfl:     levothyroxine (SYNTHROID/LEVOTHROID) 125 MCG tablet, TAKE 1 TABLET BY MOUTH EVERY DAY, Disp: 90 tablet, Rfl: 2    lisinopril (ZESTRIL) 10 MG tablet, TAKE 1 TABLET BY MOUTH EVERY DAY, Disp: 90 tablet, Rfl: 3    omeprazole (PRILOSEC) 20 MG DR capsule, TAKE 1 CAPSULE BY MOUTH EVERY DAY, Disp: 90 capsule, Rfl: 2    Probiotic Product (FORTIFY PROBIOTIC WOMENS PO), Take 1 capsule by mouth daily., Disp: , Rfl:     ALLERGIES:  Allergies   Allergen Reactions    Synvisc [Hylan G-F 20]        FAMILY HISTORY:  Family History   Problem Relation Age of Onset    Cerebrovascular Disease Mother     Breast Cancer Sister 62.00    Breast Cancer Cousin         age in 50's       SOCIAL HISTORY:   Social History     Socioeconomic History    Marital status:    Tobacco Use    Smoking status: Never     Passive exposure: Never    Smokeless tobacco: Never   Vaping Use    Vaping status:  "Never Used   Substance and Sexual Activity    Alcohol use: Yes     Comment: Alcoholic Drinks/day: infrequent    Drug use: Never   Social History Narrative    , [place]   1 child  3M  Retired     Social Determinants of Health     Financial Resource Strain: Low Risk  (1/25/2024)    Financial Resource Strain     Within the past 12 months, have you or your family members you live with been unable to get utilities (heat, electricity) when it was really needed?: No   Food Insecurity: Low Risk  (1/25/2024)    Food Insecurity     Within the past 12 months, did you worry that your food would run out before you got money to buy more?: No     Within the past 12 months, did the food you bought just not last and you didn t have money to get more?: No   Transportation Needs: Low Risk  (1/25/2024)    Transportation Needs     Within the past 12 months, has lack of transportation kept you from medical appointments, getting your medicines, non-medical meetings or appointments, work, or from getting things that you need?: No   Interpersonal Safety: Low Risk  (4/22/2024)    Interpersonal Safety     Do you feel physically and emotionally safe where you currently live?: Yes     Within the past 12 months, have you been hit, slapped, kicked or otherwise physically hurt by someone?: No     Within the past 12 months, have you been humiliated or emotionally abused in other ways by your partner or ex-partner?: No   Housing Stability: Low Risk  (1/25/2024)    Housing Stability     Do you have housing? : Yes     Are you worried about losing your housing?: No       PHYSICAL EXAM:    Vitals: BP (!) 172/85   Pulse 87   Temp 98.9  F (37.2  C) (Temporal)   Resp 20   Ht 1.575 m (5' 2\")   Wt 86.2 kg (190 lb)   LMP  (LMP Unknown)   SpO2 92%   BMI 34.75 kg/m     General:. Alert and interactive, mildly uncomfortable appearing.  HENT: Oropharynx without erythema or exudates. MMM.  TMs clear bilaterally.  Eyes: Pupils mid-sized and equally " reactive.   Neck: Full AROM.  No midline tenderness to palpation.  Cardiovascular: Regular rate and rhythm. Peripheral pulses 2+ bilaterally.  Chest/Pulmonary: Normal work of breathing. Lung sounds clear and equal throughout, no wheezes or crackles. No chest wall tenderness or deformities.  Abdomen: Soft, nondistended. Epigastric abdominal discomfort as well as RLQ pain. No guarding or rebound.  Back/Spine: No CVA or midline tenderness.  Extremities: Normal ROM of all major joints. No lower extremity edema.   Skin: Warm and dry. Normal skin color.   Neuro: Speech clear. CNs grossly intact. Moves all extremities appropriately. Strength and sensation grossly intact to all extremities.   Psych: Normal affect/mood, cooperative, memory appropriate.     LAB:  All pertinent labs reviewed and interpreted.  Labs Ordered and Resulted from Time of ED Arrival to Time of ED Departure   COMPREHENSIVE METABOLIC PANEL - Abnormal       Result Value    Sodium 139      Potassium 4.0      Carbon Dioxide (CO2) 24      Anion Gap 15      Urea Nitrogen 15.0      Creatinine 1.01 (*)     GFR Estimate 61      Calcium 9.4      Chloride 100      Glucose 119 (*)     Alkaline Phosphatase 246 (*)      (*)      (*)     Protein Total 7.0      Albumin 4.3      Bilirubin Total 0.8     LIPASE - Normal    Lipase 51     CBC WITH PLATELETS AND DIFFERENTIAL    WBC Count 10.2      RBC Count 4.59      Hemoglobin 13.6      Hematocrit 39.8      MCV 87      MCH 29.6      MCHC 34.2      RDW 11.9      Platelet Count 262      % Neutrophils 80      % Lymphocytes 11      % Monocytes 7      % Eosinophils 1      % Basophils 0      % Immature Granulocytes 0      NRBCs per 100 WBC 0      Absolute Neutrophils 8.1      Absolute Lymphocytes 1.2      Absolute Monocytes 0.7      Absolute Eosinophils 0.1      Absolute Basophils 0.0      Absolute Immature Granulocytes 0.0      Absolute NRBCs 0.0         RADIOLOGY:  US Abdomen Limited   Final Result   IMPRESSION:    1.  Tumefactive sludge. No shadowing gallstones or other sonographic evidence of acute cholecystitis.   2.  Hepatic steatosis.          EKG:  See MDM    I have independently reviewed and interpreted the EKG(s) documented above.         I, Emmanuel Cao, am serving as a scribe to document services personally performed by Dr. Mayte Lua  based on my observation and the provider's statements to me. I, Mayte Lua MD attest that Emmanuel Cao is acting in a scribe capacity, has observed my performance of the services and has documented them in accordance with my direction.      Mayte Lua M.D.  Emergency Medicine  [facility] EMERGENCY ROOM  [address] [address] [phone]  Dept: [phone]     Mayte Lua MD  09/14/24 0150

## 2024-09-16 ENCOUNTER — TELEPHONE (OUTPATIENT)
Dept: SURGERY | Facility: CLINIC | Age: 69
End: 2024-09-16
Payer: COMMERCIAL

## 2024-09-16 NOTE — TELEPHONE ENCOUNTER
Patient calling in this morning to report that she had another gall bladder attack over the weekend and ended up in the ER.  Patient saw Dr Smith for consult of this issue on 8/12/24 and it was determined at that time that she would like to think about it more before moving forward with surgery.  Based on past weekends events, patient is prepared to move forward with surgical intervention.      Dr Smith, please review note if needed and place appropriate orders for surgery.  I will call patient as soon as I receive the orders and get her on the schedule.

## 2024-09-23 ENCOUNTER — PREP FOR PROCEDURE (OUTPATIENT)
Dept: SURGERY | Facility: CLINIC | Age: 69
End: 2024-09-23
Payer: COMMERCIAL

## 2024-09-23 DIAGNOSIS — K80.20 SYMPTOMATIC CHOLELITHIASIS: Primary | ICD-10-CM

## 2024-09-23 RX ORDER — CEFAZOLIN SODIUM/WATER 2 G/20 ML
2 SYRINGE (ML) INTRAVENOUS
Status: CANCELLED | OUTPATIENT
Start: 2024-09-26

## 2024-09-23 NOTE — PROGRESS NOTES
Patient had called wanting to move forward with surgery as she was in the ER again.  Her symptoms have since resolved.  Her LFTs were elevated further however bili normal.  Has underwent stress test, says no further workup needed.  Will have her recheck LFTs and schedule for lap lisset, possible IOC depending on those results.    Syd Smith MD

## 2024-09-24 ENCOUNTER — OFFICE VISIT (OUTPATIENT)
Dept: INTERNAL MEDICINE | Facility: CLINIC | Age: 69
End: 2024-09-24
Payer: COMMERCIAL

## 2024-09-24 VITALS
TEMPERATURE: 98 F | HEART RATE: 87 BPM | WEIGHT: 195 LBS | BODY MASS INDEX: 35.88 KG/M2 | HEIGHT: 62 IN | OXYGEN SATURATION: 97 % | DIASTOLIC BLOOD PRESSURE: 78 MMHG | RESPIRATION RATE: 16 BRPM | SYSTOLIC BLOOD PRESSURE: 110 MMHG

## 2024-09-24 DIAGNOSIS — K80.50 BILIARY COLIC: ICD-10-CM

## 2024-09-24 DIAGNOSIS — E03.9 HYPOTHYROIDISM, UNSPECIFIED TYPE: ICD-10-CM

## 2024-09-24 DIAGNOSIS — Z51.81 ENCOUNTER FOR THERAPEUTIC DRUG MONITORING: ICD-10-CM

## 2024-09-24 DIAGNOSIS — G47.33 OSA (OBSTRUCTIVE SLEEP APNEA): ICD-10-CM

## 2024-09-24 DIAGNOSIS — Z01.818 PREOPERATIVE EXAMINATION: Primary | ICD-10-CM

## 2024-09-24 DIAGNOSIS — F32.A DEPRESSION, UNSPECIFIED DEPRESSION TYPE: ICD-10-CM

## 2024-09-24 DIAGNOSIS — I44.7 LEFT BUNDLE BRANCH BLOCK: ICD-10-CM

## 2024-09-24 DIAGNOSIS — I10 ESSENTIAL HYPERTENSION: ICD-10-CM

## 2024-09-24 LAB
ALBUMIN SERPL BCG-MCNC: 4.3 G/DL (ref 3.5–5.2)
ALP SERPL-CCNC: 167 U/L (ref 40–150)
ALT SERPL W P-5'-P-CCNC: 48 U/L (ref 0–50)
ANION GAP SERPL CALCULATED.3IONS-SCNC: 9 MMOL/L (ref 7–15)
AST SERPL W P-5'-P-CCNC: 25 U/L (ref 0–45)
BILIRUB SERPL-MCNC: 0.6 MG/DL
BUN SERPL-MCNC: 17.3 MG/DL (ref 8–23)
CALCIUM SERPL-MCNC: 9.9 MG/DL (ref 8.8–10.4)
CHLORIDE SERPL-SCNC: 106 MMOL/L (ref 98–107)
CREAT SERPL-MCNC: 1.03 MG/DL (ref 0.51–0.95)
EGFRCR SERPLBLD CKD-EPI 2021: 59 ML/MIN/1.73M2
ERYTHROCYTE [DISTWIDTH] IN BLOOD BY AUTOMATED COUNT: 11.5 % (ref 10–15)
GLUCOSE SERPL-MCNC: 110 MG/DL (ref 70–99)
HCO3 SERPL-SCNC: 24 MMOL/L (ref 22–29)
HCT VFR BLD AUTO: 42.1 % (ref 35–47)
HGB BLD-MCNC: 14.1 G/DL (ref 11.7–15.7)
MCH RBC QN AUTO: 29.6 PG (ref 26.5–33)
MCHC RBC AUTO-ENTMCNC: 33.5 G/DL (ref 31.5–36.5)
MCV RBC AUTO: 88 FL (ref 78–100)
PLATELET # BLD AUTO: 299 10E3/UL (ref 150–450)
POTASSIUM SERPL-SCNC: 4.6 MMOL/L (ref 3.4–5.3)
PROT SERPL-MCNC: 7.3 G/DL (ref 6.4–8.3)
RBC # BLD AUTO: 4.77 10E6/UL (ref 3.8–5.2)
SODIUM SERPL-SCNC: 139 MMOL/L (ref 135–145)
TSH SERPL DL<=0.005 MIU/L-ACNC: 1.3 UIU/ML (ref 0.3–4.2)
WBC # BLD AUTO: 6 10E3/UL (ref 4–11)

## 2024-09-24 PROCEDURE — 36415 COLL VENOUS BLD VENIPUNCTURE: CPT | Performed by: INTERNAL MEDICINE

## 2024-09-24 PROCEDURE — 99214 OFFICE O/P EST MOD 30 MIN: CPT | Performed by: INTERNAL MEDICINE

## 2024-09-24 PROCEDURE — 84443 ASSAY THYROID STIM HORMONE: CPT | Performed by: INTERNAL MEDICINE

## 2024-09-24 PROCEDURE — 80053 COMPREHEN METABOLIC PANEL: CPT | Performed by: INTERNAL MEDICINE

## 2024-09-24 PROCEDURE — 85027 COMPLETE CBC AUTOMATED: CPT | Performed by: INTERNAL MEDICINE

## 2024-09-24 NOTE — PROGRESS NOTES
Preoperative Evaluation  [facility]  [address] [address]  Phone: [phone]  Fax: [phone]  Primary Provider: Ho Quezada MD  Pre-op Performing Provider: Suleiman Chou MD  Sep 24, 2024             9/24/2024   Surgical Information   What procedure is being done? Gallbladder surgery   Facility or Hospital where procedure/surgery will be performed: [facility]   Who is doing the procedure / surgery? Dr Smith   Date of surgery / procedure: 9/26/24   Time of surgery / procedure: ?   Where do you plan to recover after surgery? at home with family        Fax number for surgical facility: Note does not need to be faxed, will be available electronically in Epic.    Assessment & Plan     The proposed surgical procedure is considered INTERMEDIATE risk.    Preoperative examination  Patient is cleared to proceed with laparoscopic cholecystectomy as planned.    I did discuss some risks of surgery including bowel perforation risk, pancreatitis risk, infection risk, bleeding risk, bile leak risk, anesthesia risk and other risks.  Patient accepts risk of surgery and wishes to proceed.    Not on NSAIDs or blood thinners.  No active infection symptoms at this time and her abdomen pain is resolved from earlier this month.    Patient instructions:  Do not take any aspirin, ibuprofen or Aleve before the surgery.    Nothing to eat after midnight for day of surgery.    Take your lisinopril and your levothyroxine medication with a sip of water on the morning of surgery.  Skip your citalopram and omeprazole that morning.    Bring your oral appliance with in case needed for your sleep apnea in the recovery room.      Biliary colic  Symptoms have resolved, thickened gallbladder wall on ultrasound and elevated liver test consistent with biliary colic as source of symptoms    Left bundle branch block  New finding but otherwise negative heart workup including stress test  and echo earlier this month.  Conduction disease likely associated with her sleep apnea.    HONEY (obstructive sleep apnea)  Compliant with her oral appliance.  She will plan to bring that with her in case needed for the recovery room    Essential hypertension, with history of left nephrectomy for donating kidney  Controlled on lisinopril 10 mg daily.  She will take lisinopril the morning of surgery as usual with a sip of water    Hypothyroidism, unspecified type  Clinically euthyroid.  Recheck TSH.  She will take levothyroxine on the morning of surgery with sip of water    Depression, unspecified depression type  Controlled.  She was told to skip citalopram the day of surgery.    History of heartburn, she was told to skip Prilosec on day of surgery.    Comprehensive metabolic panel, hemogram and TSH checked for preop.    Has a follow-up appointment with her primary care doctor in November.                 Recommendation  Approval given to proceed with proposed procedure, without further diagnostic evaluation.    Nhung Chau is a 69 year old, presenting for the following:  Pre-Op Exam (Pt is fasting- Gallbladder removal on 9/26 at [facility] by Dr Smith)          9/24/2024    11:04 AM   Additional Questions   Roomed by Trish PIMENTEL related to upcoming procedure: Patient had recurrent biliary colic with right upper quadrant abdominal pain on September 14.  She had a similar episode a month earlier.    On September 14 AST was 180 and  and alkaline phosphatase 246 with normal bilirubin.  Normal hemogram.  Creatinine is anticoagulated.  1.0.    Ultrasound September 13 showed mild gallbladder wall thickening with gallbladder sludge but no stones.    Patient is scheduled for a laparoscopic cholecystectomy in 2 days.    Patient had a left kidney donated 10 years ago.  Previous gynecologic laparoscopic procedure and vaginal hysterectomy.  No previous upper abdominal surgery.    No history of blood clot.  No  history of bleeding problems.  No history of anesthesia complications.    She has not been on NSAIDs or aspirin.    August 2024 CT scan of the abdomen showed diverticulosis, but otherwise normal liver and gallbladder.  No AAA.    She was diagnosed with a new left bundle branch block in August.    She underwent cardiac evaluation including a stress test on September 5 that was negative for ischemia.  There was a small area of anterior septal wall decreased uptake was felt to be secondary to breast attenuation.  Cardiac echo September 5 was normal ejection fraction of 61% with septal wall motion abnormality consistent with bundle branch block but otherwise normal, no valve problems.    No history of vascular disease, is on atorvastatin with family history of heart disease with father.  Patient is a non-smoker.    No chest pain or chest pressure and can walk 2 blocks without difficulty currently.    She does have obstructive sleep apnea but uses an oral appliance.  No history of atrial fibrillation or stroke.    Blood pressure has been controlled with lisinopril 10 mg in the morning.    Heartburn controlled with Prilosec.  No history of GI bleeding.    Past history of hypothyroid with normal TSH in January of this year.    Glaucoma on drops.    Dysthymia on citalopram, controlled.    No cough or respiratory illness.  No UTI type symptoms.    Lives independently with         9/24/2024   Pre-Op Questionnaire   Have you ever had a heart attack or stroke? No   Have you ever had surgery on your heart or blood vessels, such as a stent placement, a coronary artery bypass, or surgery on an artery in your head, neck, heart, or legs? No   Do you have chest pain with activity? No   Do you have a history of heart failure? No   Do you currently have a cold, bronchitis or symptoms of other infection? No   Do you have a cough, shortness of breath, or wheezing? No   Do you or anyone in your family have previous history of blood  clots? No   Do you or does anyone in your family have a serious bleeding problem such as prolonged bleeding following surgeries or cuts? No   Have you ever had problems with anemia or been told to take iron pills? No   Have you had any abnormal blood loss such as black, tarry or bloody stools, or abnormal vaginal bleeding? No   Have you ever had a blood transfusion? No   Are you willing to have a blood transfusion if it is medically needed before, during, or after your surgery? Yes   Have you or any of your relatives ever had problems with anesthesia? (!) YES prior nausea over 10 years ago   Do you have sleep apnea, excessive snoring or daytime drowsiness? (!) YES   Do you have a CPAP machine? (!) NO uses oral appliance   Do you have any artifical heart valves or other implanted medical devices like a pacemaker, defibrillator, or continuous glucose monitor? No   Do you have artificial joints? (!) YES   Are you allergic to latex? No        Health Care Directive  Patient does not have a Health Care Directive or Living Will: Full code    Preoperative Review of    reviewed - no record of controlled substances prescribed.          Patient Active Problem List    Diagnosis Date Noted    Chronic foot pain, left 04/22/2024     Priority: Medium    Epiploic appendagitis 01/25/2024     Priority: Medium    Balance problems 12/01/2023     Priority: Medium    Epistaxis 08/28/2023     Priority: Medium    Primary osteoarthritis involving multiple joints 07/21/2022     Priority: Medium     Bilateral TKA and chronic pain involving left ankle and right shoulder      Open-angle glaucoma of both eyes 07/21/2022     Priority: Medium    Class 2 severe obesity with serious comorbidity and body mass index (BMI) of 37.0 to 37.9 in adult (H) 07/21/2022     Priority: Medium    Status post total bilateral knee replacement 03/14/2022     Priority: Medium    History of colon polyps 03/11/2022     Priority: Medium     Colonoscopy December  2018 with multiple polyps.  Colonoscopy March 2022 with one adenomatous polyp and several hyperplastic polyps      Bilateral hearing loss 03/26/2021     Priority: Medium     She has hearing aids        GERD with esophagitis      Priority: Medium     Complaints of dysphagia with EGD showing esophagitis.  Negative for   eosinophilic esophagitis September 2019        Solitary kidney, acquired 04/26/2016     Priority: Medium     Formatting of this note might be different from the original.  Solitary kidney, acquired - transplant donor        HTN (hypertension)      Priority: Medium    Hypothyroidism      Priority: Medium    Hyperlipidemia      Priority: Medium    HONEY (obstructive sleep apnea)      Priority: Medium    Postmenopausal symptoms      Priority: Medium    Chronic sinusitis      Priority: Medium      Past Medical History:   Diagnosis Date    Acute torn meniscus of knee, left, initial encounter 02/08/2018    Acute torn meniscus of knee, left, sequela 05/14/2019    Balance problems 12/01/2023    Bilateral hearing loss 03/26/2021    Hearing aids    Chest pain 03/14/2022    Chronic foot pain, left 04/22/2024    Chronic left shoulder pain 05/03/2021    Chronic sinusitis     Class 2 severe obesity with serious comorbidity and body mass index (BMI) of 37.0 to 37.9 in adult (H) 07/21/2022    Diverticulitis 08/09/2016    CT scan normal    Epiploic appendagitis 01/25/2024    GERD with esophagitis     Complaints of dysphagia with EGD showing esophagitis.  Negative for eosinophilic esophagitis September 2019    History of colon polyps     Colonoscopy December 2018 with multiple polyps.  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  Added automatically from request  for surgery [id number]    Influenza A 03/14/2022    Migraines     Open-angle glaucoma of both eyes 07/21/2022    HONEY (obstructive sleep apnea)     Osteoarthritis     knees, hands    PONV (postoperative nausea and vomiting)     Postmenopausal symptoms     Primary osteoarthritis involving multiple joints 07/21/2022    Bilateral TKA and chronic pain involving left ankle and right shoulder    Primary osteoarthritis of both knees 03/26/2021    Bilateral TKA    Primary osteoarthritis of right knee 04/12/2016    R TKA    Rectal bleeding 08/09/2016    Rectocele 11/11/2016    Surgery is planned    Screening     DEXA normal 2016 and normal 2022    Situational anxiety     Status post total bilateral knee replacement 03/14/2022    Tear of right rotator cuff 06/22/2023    Shoulder surg 2023     Past Surgical History:   Procedure Laterality Date    ARTHRODESIS ANKLE Left 5/20/2024    Procedure: LEFT TALONAVICULAR FUSION;  Surgeon: Nila Tyler MD;  Location:  OR    ARTHROPLASTY KNEE Right 04/01/2016    ARTHROPLASTY KNEE Left     jan 2020    ARTHROSCOPY KNEE Bilateral 2011 2014    BUNIONECTOMY      BUNIONECTOMY Left 5/20/2024    Procedure: AND LEFT BUNION REPAIR;  Surgeon: Nila Tyler MD;  Location:  OR    COLPORRHAPHY N/A 02/21/2017    Procedure: POSTERIOR COLPORRHAPHY;  Surgeon: Roxana Frias MD;  Location: [facility];  Service:     HYSTERECTOMY VAGINAL N/A 02/21/2017    Procedure: TOTAL VAGINAL HYSTERECTOMY, BILATERAL SALPINGO-OOPHORECTOMY;  Surgeon: Roxana Frias MD;  Location: [facility];  Service:     MIDDLE EAR SURGERY      NEPHRECTOMY Left     Kidney donor    OOPHORECTOMY      RECESSION GASTROCNEMIUS Left 5/20/2024    Procedure: AND LEFT GASTROC SLIDE;  Surgeon: Nila Tyler MD;  Location:  OR    SHOULDER ARTHROSCOPY W/ ROTATOR CUFF REPAIR Right     TUBAL LIGATION       Current Outpatient Medications   Medication Sig Dispense Refill    atorvastatin (LIPITOR) 20 MG tablet TAKE  "1 TABLET BY MOUTH EVERY DAY 90 tablet 2    citalopram (CELEXA) 20 MG tablet TAKE 1 TABLET BY MOUTH EVERY DAY 90 tablet 2    latanoprost (XALATAN) 0.005 % ophthalmic solution Place 1 drop into both eyes every evening      levothyroxine (SYNTHROID/LEVOTHROID) 125 MCG tablet TAKE 1 TABLET BY MOUTH EVERY DAY 90 tablet 2    lisinopril (ZESTRIL) 10 MG tablet TAKE 1 TABLET BY MOUTH EVERY DAY 90 tablet 3    omeprazole (PRILOSEC) 20 MG DR capsule TAKE 1 CAPSULE BY MOUTH EVERY DAY 90 capsule 2    ondansetron (ZOFRAN ODT) 8 MG ODT tab Take 1 tablet (8 mg) by mouth every 8 hours as needed for nausea. 12 tablet 0    Probiotic Product (FORTIFY PROBIOTIC WOMENS PO) Take 1 capsule by mouth daily.      acetaminophen (TYLENOL) 325 MG tablet Take 2 tablets (650 mg) by mouth every 4 hours as needed for mild pain (Patient not taking: Reported on 9/24/2024) 50 tablet 0    amoxicillin (AMOXIL) 500 MG capsule TAKE 4 CAPSULES (2,000 MG) BY MOUTH ONCE FOR 1 DOSE PRIOR TO DENTIST VISITS 4 capsule 3       Allergies   Allergen Reactions    Synvisc [Hylan G-F 20]         Social History     Tobacco Use    Smoking status: Never     Passive exposure: Never    Smokeless tobacco: Never   Substance Use Topics    Alcohol use: Yes     Comment: Alcoholic Drinks/day: infrequent     Family History   Problem Relation Age of Onset    Cerebrovascular Disease Mother     Breast Cancer Sister 62.00    Breast Cancer Cousin         age in 50's     History   Drug Use Unknown             Review of Systems  Constitutional, neuro, ENT, endocrine, pulmonary, cardiac, gastrointestinal, genitourinary, musculoskeletal, integument and psychiatric systems are negative, except as otherwise noted.    Objective    /78 (BP Location: Right arm, Patient Position: Sitting)   Pulse 87   Temp 98  F (36.7  C)   Resp 16   Ht 1.575 m (5' 2\")   Wt 88.5 kg (195 lb)   LMP  (LMP Unknown)   SpO2 97%   BMI 35.67 kg/m     Estimated body mass index is 35.67 kg/m  as calculated " "from the following:    Height as of this encounter: 1.575 m (5' 2\").    Weight as of this encounter: 88.5 kg (195 lb).  Physical Exam  Moderately overweight female.  Alert and oriented x 3.  Normal cognition.  Able to climb up on exam table.  Pupils and irises equal and reactive.  Extraocular muscles intact.  No jaundice.  Pharynx is normal, minimally crowded.  Teeth in good condition.  No cervical or supraclavicular adenopathy or neck mass.  No JVD and no carotid bruits.  Lungs clear to auscultation with good respiratory excursion.  Heart is regular without murmur rub or gallop.  No ankle edema.  Abdomen is obese but nontender.  There is no significant right upper quadrant tenderness and liver edge is palpable and normal.  No pulsatile abdominal mass.    Recent Labs   Lab Test 09/14/24  0100 08/07/24  1656 02/13/24  1018 09/26/23  0752   HGB 13.6 13.6   < > 13.6    281   < > 263   INR  --   --   --  0.95    141   < > 140   POTASSIUM 4.0 3.8   < > 4.3   CR 1.01* 1.05*   < > 1.04*   A1C  --   --   --  5.6    < > = values in this interval not displayed.        Diagnostics  Labs pending at this time.  Results will be reviewed when available.  Recent Results (from the past 720 hour(s))   Echocardiogram Complete    Collection Time: 09/05/24  8:40 AM   Result Value Ref Range    Biplane LVEF 61%    NM Lexiscan stress test    Collection Time: 09/05/24 12:23 PM   Result Value Ref Range    Pharmacologic Protocol Lexiscan     Test Type Pharmacological     Baseline HR 64     Baseline Systolic      Baseline Diastolic BP 90     Last Stress HR 99     Last Stress Systolic      Last Stress Diastolic BP 81     Target      PERCENT HR 85%     ST Deviation Elevation V3 0.6mm     Deviation Time I -0.4mm     ST Elevation Amount aVL 0.4mm     ST Deviation Amount he III -0.7mm     Final Resting /82     Final Resting HR 74     Max Treadmill Speed 0.0     Max Treadmill Grade 0.0     Peak Systolic /81 "     Peak Diastolic /89     Max HR  118     Stress Phase Resting     Stress Resting Pt Position MANUAL EVENT     Current HR 65     Current /90     Stress Phase Stress     Stage Minute EXE 00:00     Exercise Stage STAGE 2     Current HR 62     Current /90     Stress Phase Stress     Stage Minute EXE 01:00     Exercise Stage STAGE 3     Current HR 58     Current /90     Stress Phase Stress     Stage Minute EXE 02:00     Exercise Stage STAGE 4     Current      Current /90     Stress Phase Stress     Stage Minute EXE 02:18     Exercise Stage STAGE 4     Current      Current /89     Stress Phase Stress     Stage Minute EXE 03:00     Exercise Stage STAGE 5     Current      Current /89     Stress Phase Stress     Stage Minute EXE 03:44     Exercise Stage STAGE 5     Current HR 98     Current /81     Stress Phase Stress     Stage Minute EXE 04:00     Exercise Stage STAGE 6     Current HR 99     Current /81     Stress Phase Stress     Stage Minute EXE 04:00     Exercise Stage STAGE 6     Current HR 99     Current /81     Stress Phase Recovery     Stage Minute REC 00:59     Exercise Stage Recovery     Current HR 88     Current /81     Stress Phase Recovery     Stage Minute REC 01:59     Exercise Stage Recovery     Current HR 80     Current /81     Stress Phase Recovery     Stage Minute REC 02:47     Exercise Stage Recovery     Current HR 78     Current /82     Stress Phase Recovery     Stage Minute REC 02:59     Exercise Stage Recovery     Current HR 75     Current /82     Stress Phase Recovery     Stage Minute REC 03:59     Exercise Stage Recovery     Current HR 74     Current /82     Stress Phase Recovery     Stage Minute REC 04:00     Exercise Stage Recovery     Current HR 74     Current /82     Max Predicted HR  77 %    Rate Pressure Product 19,470.0    Comprehensive metabolic panel    Collection Time: 09/14/24   1:00 AM   Result Value Ref Range    Sodium 139 135 - 145 mmol/L    Potassium 4.0 3.4 - 5.3 mmol/L    Carbon Dioxide (CO2) 24 22 - 29 mmol/L    Anion Gap 15 7 - 15 mmol/L    Urea Nitrogen 15.0 8.0 - 23.0 mg/dL    Creatinine 1.01 (H) 0.51 - 0.95 mg/dL    GFR Estimate 61 >60 mL/min/1.73m2    Calcium 9.4 8.8 - 10.4 mg/dL    Chloride 100 98 - 107 mmol/L    Glucose 119 (H) 70 - 99 mg/dL    Alkaline Phosphatase 246 (H) 40 - 150 U/L     (H) 0 - 45 U/L     (H) 0 - 50 U/L    Protein Total 7.0 6.4 - 8.3 g/dL    Albumin 4.3 3.5 - 5.2 g/dL    Bilirubin Total 0.8 <=1.2 mg/dL   Lipase    Collection Time: 09/14/24  1:00 AM   Result Value Ref Range    Lipase 51 13 - 60 U/L   CBC with platelets and differential    Collection Time: 09/14/24  1:00 AM   Result Value Ref Range    WBC Count 10.2 4.0 - 11.0 10e3/uL    RBC Count 4.59 3.80 - 5.20 10e6/uL    Hemoglobin 13.6 11.7 - 15.7 g/dL    Hematocrit 39.8 35.0 - 47.0 %    MCV 87 78 - 100 fL    MCH 29.6 26.5 - 33.0 pg    MCHC 34.2 31.5 - 36.5 g/dL    RDW 11.9 10.0 - 15.0 %    Platelet Count 262 150 - 450 10e3/uL    % Neutrophils 80 %    % Lymphocytes 11 %    % Monocytes 7 %    % Eosinophils 1 %    % Basophils 0 %    % Immature Granulocytes 0 %    NRBCs per 100 WBC 0 <1 /100    Absolute Neutrophils 8.1 1.6 - 8.3 10e3/uL    Absolute Lymphocytes 1.2 0.8 - 5.3 10e3/uL    Absolute Monocytes 0.7 0.0 - 1.3 10e3/uL    Absolute Eosinophils 0.1 0.0 - 0.7 10e3/uL    Absolute Basophils 0.0 0.0 - 0.2 10e3/uL    Absolute Immature Granulocytes 0.0 <=0.4 10e3/uL    Absolute NRBCs 0.0 10e3/uL      EKG August 7, 2024 with left bundle branch block.    Revised Cardiac Risk Index (RCRI)  The patient has the following serious cardiovascular risks for perioperative complications:   - No serious cardiac risks = 0 points     RCRI Interpretation: 0 points: Class I (very low risk - 0.4% complication rate)         Signed Electronically by: Suleiman Chou MD  A copy of this evaluation report is provided  to the requesting physician.

## 2024-09-24 NOTE — H&P (VIEW-ONLY)
Preoperative Evaluation  [facility]  [address] [address]  Phone: [phone]  Fax: [phone]  Primary Provider: Ho Quezada MD  Pre-op Performing Provider: Suleiman Chou MD  Sep 24, 2024             9/24/2024   Surgical Information   What procedure is being done? Gallbladder surgery   Facility or Hospital where procedure/surgery will be performed: [facility]   Who is doing the procedure / surgery? Dr Smith   Date of surgery / procedure: 9/26/24   Time of surgery / procedure: ?   Where do you plan to recover after surgery? at home with family        Fax number for surgical facility: Note does not need to be faxed, will be available electronically in Epic.    Assessment & Plan     The proposed surgical procedure is considered INTERMEDIATE risk.    Preoperative examination  Patient is cleared to proceed with laparoscopic cholecystectomy as planned.    I did discuss some risks of surgery including bowel perforation risk, pancreatitis risk, infection risk, bleeding risk, bile leak risk, anesthesia risk and other risks.  Patient accepts risk of surgery and wishes to proceed.    Not on NSAIDs or blood thinners.  No active infection symptoms at this time and her abdomen pain is resolved from earlier this month.    Patient instructions:  Do not take any aspirin, ibuprofen or Aleve before the surgery.    Nothing to eat after midnight for day of surgery.    Take your lisinopril and your levothyroxine medication with a sip of water on the morning of surgery.  Skip your citalopram and omeprazole that morning.    Bring your oral appliance with in case needed for your sleep apnea in the recovery room.      Biliary colic  Symptoms have resolved, thickened gallbladder wall on ultrasound and elevated liver test consistent with biliary colic as source of symptoms    Left bundle branch block  New finding but otherwise negative heart workup including stress test  and echo earlier this month.  Conduction disease likely associated with her sleep apnea.    HONEY (obstructive sleep apnea)  Compliant with her oral appliance.  She will plan to bring that with her in case needed for the recovery room    Essential hypertension, with history of left nephrectomy for donating kidney  Controlled on lisinopril 10 mg daily.  She will take lisinopril the morning of surgery as usual with a sip of water    Hypothyroidism, unspecified type  Clinically euthyroid.  Recheck TSH.  She will take levothyroxine on the morning of surgery with sip of water    Depression, unspecified depression type  Controlled.  She was told to skip citalopram the day of surgery.    History of heartburn, she was told to skip Prilosec on day of surgery.    Comprehensive metabolic panel, hemogram and TSH checked for preop.    Has a follow-up appointment with her primary care doctor in November.                 Recommendation  Approval given to proceed with proposed procedure, without further diagnostic evaluation.    Nhung Chau is a 69 year old, presenting for the following:  Pre-Op Exam (Pt is fasting- Gallbladder removal on 9/26 at [facility] by Dr Smith)          9/24/2024    11:04 AM   Additional Questions   Roomed by Trish PIMENTEL related to upcoming procedure: Patient had recurrent biliary colic with right upper quadrant abdominal pain on September 14.  She had a similar episode a month earlier.    On September 14 AST was 180 and  and alkaline phosphatase 246 with normal bilirubin.  Normal hemogram.  Creatinine is anticoagulated.  1.0.    Ultrasound September 13 showed mild gallbladder wall thickening with gallbladder sludge but no stones.    Patient is scheduled for a laparoscopic cholecystectomy in 2 days.    Patient had a left kidney donated 10 years ago.  Previous gynecologic laparoscopic procedure and vaginal hysterectomy.  No previous upper abdominal surgery.    No history of blood clot.  No  history of bleeding problems.  No history of anesthesia complications.    She has not been on NSAIDs or aspirin.    August 2024 CT scan of the abdomen showed diverticulosis, but otherwise normal liver and gallbladder.  No AAA.    She was diagnosed with a new left bundle branch block in August.    She underwent cardiac evaluation including a stress test on September 5 that was negative for ischemia.  There was a small area of anterior septal wall decreased uptake was felt to be secondary to breast attenuation.  Cardiac echo September 5 was normal ejection fraction of 61% with septal wall motion abnormality consistent with bundle branch block but otherwise normal, no valve problems.    No history of vascular disease, is on atorvastatin with family history of heart disease with father.  Patient is a non-smoker.    No chest pain or chest pressure and can walk 2 blocks without difficulty currently.    She does have obstructive sleep apnea but uses an oral appliance.  No history of atrial fibrillation or stroke.    Blood pressure has been controlled with lisinopril 10 mg in the morning.    Heartburn controlled with Prilosec.  No history of GI bleeding.    Past history of hypothyroid with normal TSH in January of this year.    Glaucoma on drops.    Dysthymia on citalopram, controlled.    No cough or respiratory illness.  No UTI type symptoms.    Lives independently with         9/24/2024   Pre-Op Questionnaire   Have you ever had a heart attack or stroke? No   Have you ever had surgery on your heart or blood vessels, such as a stent placement, a coronary artery bypass, or surgery on an artery in your head, neck, heart, or legs? No   Do you have chest pain with activity? No   Do you have a history of heart failure? No   Do you currently have a cold, bronchitis or symptoms of other infection? No   Do you have a cough, shortness of breath, or wheezing? No   Do you or anyone in your family have previous history of blood  clots? No   Do you or does anyone in your family have a serious bleeding problem such as prolonged bleeding following surgeries or cuts? No   Have you ever had problems with anemia or been told to take iron pills? No   Have you had any abnormal blood loss such as black, tarry or bloody stools, or abnormal vaginal bleeding? No   Have you ever had a blood transfusion? No   Are you willing to have a blood transfusion if it is medically needed before, during, or after your surgery? Yes   Have you or any of your relatives ever had problems with anesthesia? (!) YES prior nausea over 10 years ago   Do you have sleep apnea, excessive snoring or daytime drowsiness? (!) YES   Do you have a CPAP machine? (!) NO uses oral appliance   Do you have any artifical heart valves or other implanted medical devices like a pacemaker, defibrillator, or continuous glucose monitor? No   Do you have artificial joints? (!) YES   Are you allergic to latex? No        Health Care Directive  Patient does not have a Health Care Directive or Living Will: Full code    Preoperative Review of    reviewed - no record of controlled substances prescribed.          Patient Active Problem List    Diagnosis Date Noted    Chronic foot pain, left 04/22/2024     Priority: Medium    Epiploic appendagitis 01/25/2024     Priority: Medium    Balance problems 12/01/2023     Priority: Medium    Epistaxis 08/28/2023     Priority: Medium    Primary osteoarthritis involving multiple joints 07/21/2022     Priority: Medium     Bilateral TKA and chronic pain involving left ankle and right shoulder      Open-angle glaucoma of both eyes 07/21/2022     Priority: Medium    Class 2 severe obesity with serious comorbidity and body mass index (BMI) of 37.0 to 37.9 in adult (H) 07/21/2022     Priority: Medium    Status post total bilateral knee replacement 03/14/2022     Priority: Medium    History of colon polyps 03/11/2022     Priority: Medium     Colonoscopy December  2018 with multiple polyps.  Colonoscopy March 2022 with one adenomatous polyp and several hyperplastic polyps      Bilateral hearing loss 03/26/2021     Priority: Medium     She has hearing aids        GERD with esophagitis      Priority: Medium     Complaints of dysphagia with EGD showing esophagitis.  Negative for   eosinophilic esophagitis September 2019        Solitary kidney, acquired 04/26/2016     Priority: Medium     Formatting of this note might be different from the original.  Solitary kidney, acquired - transplant donor        HTN (hypertension)      Priority: Medium    Hypothyroidism      Priority: Medium    Hyperlipidemia      Priority: Medium    HONEY (obstructive sleep apnea)      Priority: Medium    Postmenopausal symptoms      Priority: Medium    Chronic sinusitis      Priority: Medium      Past Medical History:   Diagnosis Date    Acute torn meniscus of knee, left, initial encounter 02/08/2018    Acute torn meniscus of knee, left, sequela 05/14/2019    Balance problems 12/01/2023    Bilateral hearing loss 03/26/2021    Hearing aids    Chest pain 03/14/2022    Chronic foot pain, left 04/22/2024    Chronic left shoulder pain 05/03/2021    Chronic sinusitis     Class 2 severe obesity with serious comorbidity and body mass index (BMI) of 37.0 to 37.9 in adult (H) 07/21/2022    Diverticulitis 08/09/2016    CT scan normal    Epiploic appendagitis 01/25/2024    GERD with esophagitis     Complaints of dysphagia with EGD showing esophagitis.  Negative for eosinophilic esophagitis September 2019    History of colon polyps     Colonoscopy December 2018 with multiple polyps.  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  Added automatically from request  for surgery [id number]    Influenza A 03/14/2022    Migraines     Open-angle glaucoma of both eyes 07/21/2022    HONEY (obstructive sleep apnea)     Osteoarthritis     knees, hands    PONV (postoperative nausea and vomiting)     Postmenopausal symptoms     Primary osteoarthritis involving multiple joints 07/21/2022    Bilateral TKA and chronic pain involving left ankle and right shoulder    Primary osteoarthritis of both knees 03/26/2021    Bilateral TKA    Primary osteoarthritis of right knee 04/12/2016    R TKA    Rectal bleeding 08/09/2016    Rectocele 11/11/2016    Surgery is planned    Screening     DEXA normal 2016 and normal 2022    Situational anxiety     Status post total bilateral knee replacement 03/14/2022    Tear of right rotator cuff 06/22/2023    Shoulder surg 2023     Past Surgical History:   Procedure Laterality Date    ARTHRODESIS ANKLE Left 5/20/2024    Procedure: LEFT TALONAVICULAR FUSION;  Surgeon: Nila Tyler MD;  Location:  OR    ARTHROPLASTY KNEE Right 04/01/2016    ARTHROPLASTY KNEE Left     jan 2020    ARTHROSCOPY KNEE Bilateral 2011 2014    BUNIONECTOMY      BUNIONECTOMY Left 5/20/2024    Procedure: AND LEFT BUNION REPAIR;  Surgeon: Nila Tyler MD;  Location:  OR    COLPORRHAPHY N/A 02/21/2017    Procedure: POSTERIOR COLPORRHAPHY;  Surgeon: Roxana Frias MD;  Location: [facility];  Service:     HYSTERECTOMY VAGINAL N/A 02/21/2017    Procedure: TOTAL VAGINAL HYSTERECTOMY, BILATERAL SALPINGO-OOPHORECTOMY;  Surgeon: Roxana Frias MD;  Location: [facility];  Service:     MIDDLE EAR SURGERY      NEPHRECTOMY Left     Kidney donor    OOPHORECTOMY      RECESSION GASTROCNEMIUS Left 5/20/2024    Procedure: AND LEFT GASTROC SLIDE;  Surgeon: Nila Tyler MD;  Location:  OR    SHOULDER ARTHROSCOPY W/ ROTATOR CUFF REPAIR Right     TUBAL LIGATION       Current Outpatient Medications   Medication Sig Dispense Refill    atorvastatin (LIPITOR) 20 MG tablet TAKE  "1 TABLET BY MOUTH EVERY DAY 90 tablet 2    citalopram (CELEXA) 20 MG tablet TAKE 1 TABLET BY MOUTH EVERY DAY 90 tablet 2    latanoprost (XALATAN) 0.005 % ophthalmic solution Place 1 drop into both eyes every evening      levothyroxine (SYNTHROID/LEVOTHROID) 125 MCG tablet TAKE 1 TABLET BY MOUTH EVERY DAY 90 tablet 2    lisinopril (ZESTRIL) 10 MG tablet TAKE 1 TABLET BY MOUTH EVERY DAY 90 tablet 3    omeprazole (PRILOSEC) 20 MG DR capsule TAKE 1 CAPSULE BY MOUTH EVERY DAY 90 capsule 2    ondansetron (ZOFRAN ODT) 8 MG ODT tab Take 1 tablet (8 mg) by mouth every 8 hours as needed for nausea. 12 tablet 0    Probiotic Product (FORTIFY PROBIOTIC WOMENS PO) Take 1 capsule by mouth daily.      acetaminophen (TYLENOL) 325 MG tablet Take 2 tablets (650 mg) by mouth every 4 hours as needed for mild pain (Patient not taking: Reported on 9/24/2024) 50 tablet 0    amoxicillin (AMOXIL) 500 MG capsule TAKE 4 CAPSULES (2,000 MG) BY MOUTH ONCE FOR 1 DOSE PRIOR TO DENTIST VISITS 4 capsule 3       Allergies   Allergen Reactions    Synvisc [Hylan G-F 20]         Social History     Tobacco Use    Smoking status: Never     Passive exposure: Never    Smokeless tobacco: Never   Substance Use Topics    Alcohol use: Yes     Comment: Alcoholic Drinks/day: infrequent     Family History   Problem Relation Age of Onset    Cerebrovascular Disease Mother     Breast Cancer Sister 62.00    Breast Cancer Cousin         age in 50's     History   Drug Use Unknown             Review of Systems  Constitutional, neuro, ENT, endocrine, pulmonary, cardiac, gastrointestinal, genitourinary, musculoskeletal, integument and psychiatric systems are negative, except as otherwise noted.    Objective    /78 (BP Location: Right arm, Patient Position: Sitting)   Pulse 87   Temp 98  F (36.7  C)   Resp 16   Ht 1.575 m (5' 2\")   Wt 88.5 kg (195 lb)   LMP  (LMP Unknown)   SpO2 97%   BMI 35.67 kg/m     Estimated body mass index is 35.67 kg/m  as calculated " "from the following:    Height as of this encounter: 1.575 m (5' 2\").    Weight as of this encounter: 88.5 kg (195 lb).  Physical Exam  Moderately overweight female.  Alert and oriented x 3.  Normal cognition.  Able to climb up on exam table.  Pupils and irises equal and reactive.  Extraocular muscles intact.  No jaundice.  Pharynx is normal, minimally crowded.  Teeth in good condition.  No cervical or supraclavicular adenopathy or neck mass.  No JVD and no carotid bruits.  Lungs clear to auscultation with good respiratory excursion.  Heart is regular without murmur rub or gallop.  No ankle edema.  Abdomen is obese but nontender.  There is no significant right upper quadrant tenderness and liver edge is palpable and normal.  No pulsatile abdominal mass.    Recent Labs   Lab Test 09/14/24  0100 08/07/24  1656 02/13/24  1018 09/26/23  0752   HGB 13.6 13.6   < > 13.6    281   < > 263   INR  --   --   --  0.95    141   < > 140   POTASSIUM 4.0 3.8   < > 4.3   CR 1.01* 1.05*   < > 1.04*   A1C  --   --   --  5.6    < > = values in this interval not displayed.        Diagnostics  Labs pending at this time.  Results will be reviewed when available.  Recent Results (from the past 720 hour(s))   Echocardiogram Complete    Collection Time: 09/05/24  8:40 AM   Result Value Ref Range    Biplane LVEF 61%    NM Lexiscan stress test    Collection Time: 09/05/24 12:23 PM   Result Value Ref Range    Pharmacologic Protocol Lexiscan     Test Type Pharmacological     Baseline HR 64     Baseline Systolic      Baseline Diastolic BP 90     Last Stress HR 99     Last Stress Systolic      Last Stress Diastolic BP 81     Target      PERCENT HR 85%     ST Deviation Elevation V3 0.6mm     Deviation Time I -0.4mm     ST Elevation Amount aVL 0.4mm     ST Deviation Amount he III -0.7mm     Final Resting /82     Final Resting HR 74     Max Treadmill Speed 0.0     Max Treadmill Grade 0.0     Peak Systolic /81 "     Peak Diastolic /89     Max HR  118     Stress Phase Resting     Stress Resting Pt Position MANUAL EVENT     Current HR 65     Current /90     Stress Phase Stress     Stage Minute EXE 00:00     Exercise Stage STAGE 2     Current HR 62     Current /90     Stress Phase Stress     Stage Minute EXE 01:00     Exercise Stage STAGE 3     Current HR 58     Current /90     Stress Phase Stress     Stage Minute EXE 02:00     Exercise Stage STAGE 4     Current      Current /90     Stress Phase Stress     Stage Minute EXE 02:18     Exercise Stage STAGE 4     Current      Current /89     Stress Phase Stress     Stage Minute EXE 03:00     Exercise Stage STAGE 5     Current      Current /89     Stress Phase Stress     Stage Minute EXE 03:44     Exercise Stage STAGE 5     Current HR 98     Current /81     Stress Phase Stress     Stage Minute EXE 04:00     Exercise Stage STAGE 6     Current HR 99     Current /81     Stress Phase Stress     Stage Minute EXE 04:00     Exercise Stage STAGE 6     Current HR 99     Current /81     Stress Phase Recovery     Stage Minute REC 00:59     Exercise Stage Recovery     Current HR 88     Current /81     Stress Phase Recovery     Stage Minute REC 01:59     Exercise Stage Recovery     Current HR 80     Current /81     Stress Phase Recovery     Stage Minute REC 02:47     Exercise Stage Recovery     Current HR 78     Current /82     Stress Phase Recovery     Stage Minute REC 02:59     Exercise Stage Recovery     Current HR 75     Current /82     Stress Phase Recovery     Stage Minute REC 03:59     Exercise Stage Recovery     Current HR 74     Current /82     Stress Phase Recovery     Stage Minute REC 04:00     Exercise Stage Recovery     Current HR 74     Current /82     Max Predicted HR  77 %    Rate Pressure Product 19,470.0    Comprehensive metabolic panel    Collection Time: 09/14/24   1:00 AM   Result Value Ref Range    Sodium 139 135 - 145 mmol/L    Potassium 4.0 3.4 - 5.3 mmol/L    Carbon Dioxide (CO2) 24 22 - 29 mmol/L    Anion Gap 15 7 - 15 mmol/L    Urea Nitrogen 15.0 8.0 - 23.0 mg/dL    Creatinine 1.01 (H) 0.51 - 0.95 mg/dL    GFR Estimate 61 >60 mL/min/1.73m2    Calcium 9.4 8.8 - 10.4 mg/dL    Chloride 100 98 - 107 mmol/L    Glucose 119 (H) 70 - 99 mg/dL    Alkaline Phosphatase 246 (H) 40 - 150 U/L     (H) 0 - 45 U/L     (H) 0 - 50 U/L    Protein Total 7.0 6.4 - 8.3 g/dL    Albumin 4.3 3.5 - 5.2 g/dL    Bilirubin Total 0.8 <=1.2 mg/dL   Lipase    Collection Time: 09/14/24  1:00 AM   Result Value Ref Range    Lipase 51 13 - 60 U/L   CBC with platelets and differential    Collection Time: 09/14/24  1:00 AM   Result Value Ref Range    WBC Count 10.2 4.0 - 11.0 10e3/uL    RBC Count 4.59 3.80 - 5.20 10e6/uL    Hemoglobin 13.6 11.7 - 15.7 g/dL    Hematocrit 39.8 35.0 - 47.0 %    MCV 87 78 - 100 fL    MCH 29.6 26.5 - 33.0 pg    MCHC 34.2 31.5 - 36.5 g/dL    RDW 11.9 10.0 - 15.0 %    Platelet Count 262 150 - 450 10e3/uL    % Neutrophils 80 %    % Lymphocytes 11 %    % Monocytes 7 %    % Eosinophils 1 %    % Basophils 0 %    % Immature Granulocytes 0 %    NRBCs per 100 WBC 0 <1 /100    Absolute Neutrophils 8.1 1.6 - 8.3 10e3/uL    Absolute Lymphocytes 1.2 0.8 - 5.3 10e3/uL    Absolute Monocytes 0.7 0.0 - 1.3 10e3/uL    Absolute Eosinophils 0.1 0.0 - 0.7 10e3/uL    Absolute Basophils 0.0 0.0 - 0.2 10e3/uL    Absolute Immature Granulocytes 0.0 <=0.4 10e3/uL    Absolute NRBCs 0.0 10e3/uL      EKG August 7, 2024 with left bundle branch block.    Revised Cardiac Risk Index (RCRI)  The patient has the following serious cardiovascular risks for perioperative complications:   - No serious cardiac risks = 0 points     RCRI Interpretation: 0 points: Class I (very low risk - 0.4% complication rate)         Signed Electronically by: Suleiman Chou MD  A copy of this evaluation report is provided  to the requesting physician.

## 2024-09-26 ENCOUNTER — ANESTHESIA (OUTPATIENT)
Dept: SURGERY | Facility: HOSPITAL | Age: 69
End: 2024-09-26
Payer: COMMERCIAL

## 2024-09-26 ENCOUNTER — HOSPITAL ENCOUNTER (OUTPATIENT)
Facility: HOSPITAL | Age: 69
Discharge: HOME | End: 2024-09-26
Attending: SURGERY | Admitting: SURGERY
Payer: COMMERCIAL

## 2024-09-26 ENCOUNTER — APPOINTMENT (OUTPATIENT)
Dept: RADIOLOGY | Facility: HOSPITAL | Age: 69
End: 2024-09-26
Attending: SURGERY
Payer: COMMERCIAL

## 2024-09-26 ENCOUNTER — ANESTHESIA EVENT (OUTPATIENT)
Dept: SURGERY | Facility: HOSPITAL | Age: 69
End: 2024-09-26
Payer: COMMERCIAL

## 2024-09-26 VITALS
TEMPERATURE: 98 F | DIASTOLIC BLOOD PRESSURE: 64 MMHG | HEART RATE: 61 BPM | BODY MASS INDEX: 35.67 KG/M2 | RESPIRATION RATE: 18 BRPM | WEIGHT: 195 LBS | SYSTOLIC BLOOD PRESSURE: 121 MMHG | OXYGEN SATURATION: 94 %

## 2024-09-26 DIAGNOSIS — K81.1 CHRONIC CHOLECYSTITIS: Primary | ICD-10-CM

## 2024-09-26 PROCEDURE — 370N000017 HC ANESTHESIA TECHNICAL FEE, PER MIN: Performed by: SURGERY

## 2024-09-26 PROCEDURE — 999N000180 XR SURGERY CARM FLUORO LESS THAN 5 MIN: Mod: TC

## 2024-09-26 PROCEDURE — 255N000002 HC RX 255 OP 636: Performed by: SURGERY

## 2024-09-26 PROCEDURE — 258N000003 HC RX IP 258 OP 636: Performed by: NURSE ANESTHETIST, CERTIFIED REGISTERED

## 2024-09-26 PROCEDURE — 250N000011 HC RX IP 250 OP 636: Performed by: NURSE ANESTHETIST, CERTIFIED REGISTERED

## 2024-09-26 PROCEDURE — 258N000003 HC RX IP 258 OP 636: Performed by: ANESTHESIOLOGY

## 2024-09-26 PROCEDURE — 272N000001 HC OR GENERAL SUPPLY STERILE: Performed by: SURGERY

## 2024-09-26 PROCEDURE — 88304 TISSUE EXAM BY PATHOLOGIST: CPT | Mod: 26 | Performed by: PATHOLOGY

## 2024-09-26 PROCEDURE — P9045 ALBUMIN (HUMAN), 5%, 250 ML: HCPCS | Performed by: NURSE ANESTHETIST, CERTIFIED REGISTERED

## 2024-09-26 PROCEDURE — 47563 LAPARO CHOLECYSTECTOMY/GRAPH: CPT | Performed by: SURGERY

## 2024-09-26 PROCEDURE — 250N000013 HC RX MED GY IP 250 OP 250 PS 637: Performed by: ANESTHESIOLOGY

## 2024-09-26 PROCEDURE — 47563 LAPARO CHOLECYSTECTOMY/GRAPH: CPT | Performed by: ANESTHESIOLOGY

## 2024-09-26 PROCEDURE — 710N000009 HC RECOVERY PHASE 1, LEVEL 1, PER MIN: Performed by: SURGERY

## 2024-09-26 PROCEDURE — 999N000141 HC STATISTIC PRE-PROCEDURE NURSING ASSESSMENT: Performed by: SURGERY

## 2024-09-26 PROCEDURE — 710N000012 HC RECOVERY PHASE 2, PER MINUTE: Performed by: SURGERY

## 2024-09-26 PROCEDURE — 250N000011 HC RX IP 250 OP 636: Performed by: ANESTHESIOLOGY

## 2024-09-26 PROCEDURE — 250N000009 HC RX 250: Performed by: NURSE ANESTHETIST, CERTIFIED REGISTERED

## 2024-09-26 PROCEDURE — 360N000083 HC SURGERY LEVEL 3 W/ FLUORO, PER MIN: Performed by: SURGERY

## 2024-09-26 PROCEDURE — 47563 LAPARO CHOLECYSTECTOMY/GRAPH: CPT | Performed by: NURSE ANESTHETIST, CERTIFIED REGISTERED

## 2024-09-26 PROCEDURE — 88304 TISSUE EXAM BY PATHOLOGIST: CPT | Mod: TC | Performed by: SURGERY

## 2024-09-26 PROCEDURE — 250N000011 HC RX IP 250 OP 636: Performed by: SURGERY

## 2024-09-26 PROCEDURE — 258N000003 HC RX IP 258 OP 636: Performed by: SURGERY

## 2024-09-26 RX ORDER — LIDOCAINE 40 MG/G
CREAM TOPICAL
Status: DISCONTINUED | OUTPATIENT
Start: 2024-09-26 | End: 2024-09-26 | Stop reason: HOSPADM

## 2024-09-26 RX ORDER — ONDANSETRON 4 MG/1
4 TABLET, ORALLY DISINTEGRATING ORAL EVERY 30 MIN PRN
Status: DISCONTINUED | OUTPATIENT
Start: 2024-09-26 | End: 2024-09-26 | Stop reason: HOSPADM

## 2024-09-26 RX ORDER — BUPIVACAINE HYDROCHLORIDE 2.5 MG/ML
INJECTION, SOLUTION INFILTRATION; PERINEURAL PRN
Status: DISCONTINUED | OUTPATIENT
Start: 2024-09-26 | End: 2024-09-26 | Stop reason: HOSPADM

## 2024-09-26 RX ORDER — NALOXONE HYDROCHLORIDE 0.4 MG/ML
0.1 INJECTION, SOLUTION INTRAMUSCULAR; INTRAVENOUS; SUBCUTANEOUS
Status: DISCONTINUED | OUTPATIENT
Start: 2024-09-26 | End: 2024-09-26 | Stop reason: HOSPADM

## 2024-09-26 RX ORDER — ACETAMINOPHEN 325 MG/1
975 TABLET ORAL ONCE
Status: DISCONTINUED | OUTPATIENT
Start: 2024-09-26 | End: 2024-09-26 | Stop reason: HOSPADM

## 2024-09-26 RX ORDER — SODIUM CHLORIDE, SODIUM LACTATE, POTASSIUM CHLORIDE, CALCIUM CHLORIDE 600; 310; 30; 20 MG/100ML; MG/100ML; MG/100ML; MG/100ML
INJECTION, SOLUTION INTRAVENOUS CONTINUOUS
Status: DISCONTINUED | OUTPATIENT
Start: 2024-09-26 | End: 2024-09-26 | Stop reason: HOSPADM

## 2024-09-26 RX ORDER — ACETAMINOPHEN 325 MG/1
975 TABLET ORAL ONCE
Status: COMPLETED | OUTPATIENT
Start: 2024-09-26 | End: 2024-09-26

## 2024-09-26 RX ORDER — DEXAMETHASONE SODIUM PHOSPHATE 10 MG/ML
4 INJECTION, SOLUTION INTRAMUSCULAR; INTRAVENOUS
Status: DISCONTINUED | OUTPATIENT
Start: 2024-09-26 | End: 2024-09-26 | Stop reason: HOSPADM

## 2024-09-26 RX ORDER — SODIUM CHLORIDE, SODIUM LACTATE, POTASSIUM CHLORIDE, AND CALCIUM CHLORIDE .6; .31; .03; .02 G/100ML; G/100ML; G/100ML; G/100ML
IRRIGANT IRRIGATION PRN
Status: DISCONTINUED | OUTPATIENT
Start: 2024-09-26 | End: 2024-09-26 | Stop reason: HOSPADM

## 2024-09-26 RX ORDER — ACETAMINOPHEN 325 MG/1
650 TABLET ORAL
Status: DISCONTINUED | OUTPATIENT
Start: 2024-09-26 | End: 2024-09-26 | Stop reason: HOSPADM

## 2024-09-26 RX ORDER — HYDROCODONE BITARTRATE AND ACETAMINOPHEN 5; 325 MG/1; MG/1
1-2 TABLET ORAL EVERY 4 HOURS PRN
Qty: 12 TABLET | Refills: 0 | Status: SHIPPED | OUTPATIENT
Start: 2024-09-26

## 2024-09-26 RX ORDER — OXYCODONE HYDROCHLORIDE 5 MG/1
10 TABLET ORAL
Status: DISCONTINUED | OUTPATIENT
Start: 2024-09-26 | End: 2024-09-26 | Stop reason: HOSPADM

## 2024-09-26 RX ORDER — DEXAMETHASONE SODIUM PHOSPHATE 10 MG/ML
INJECTION, SOLUTION INTRAMUSCULAR; INTRAVENOUS PRN
Status: DISCONTINUED | OUTPATIENT
Start: 2024-09-26 | End: 2024-09-26

## 2024-09-26 RX ORDER — LIDOCAINE HYDROCHLORIDE 10 MG/ML
INJECTION, SOLUTION INFILTRATION; PERINEURAL PRN
Status: DISCONTINUED | OUTPATIENT
Start: 2024-09-26 | End: 2024-09-26

## 2024-09-26 RX ORDER — PROPOFOL 10 MG/ML
INJECTION, EMULSION INTRAVENOUS CONTINUOUS PRN
Status: DISCONTINUED | OUTPATIENT
Start: 2024-09-26 | End: 2024-09-26

## 2024-09-26 RX ORDER — OXYCODONE HYDROCHLORIDE 5 MG/1
5 TABLET ORAL
Status: COMPLETED | OUTPATIENT
Start: 2024-09-26 | End: 2024-09-26

## 2024-09-26 RX ORDER — ONDANSETRON 2 MG/ML
4 INJECTION INTRAMUSCULAR; INTRAVENOUS EVERY 30 MIN PRN
Status: DISCONTINUED | OUTPATIENT
Start: 2024-09-26 | End: 2024-09-26 | Stop reason: HOSPADM

## 2024-09-26 RX ORDER — EPHEDRINE SULFATE 50 MG/ML
INJECTION, SOLUTION INTRAMUSCULAR; INTRAVENOUS; SUBCUTANEOUS PRN
Status: DISCONTINUED | OUTPATIENT
Start: 2024-09-26 | End: 2024-09-26

## 2024-09-26 RX ORDER — DEXAMETHASONE SODIUM PHOSPHATE 4 MG/ML
4 INJECTION, SOLUTION INTRA-ARTICULAR; INTRALESIONAL; INTRAMUSCULAR; INTRAVENOUS; SOFT TISSUE
Status: DISCONTINUED | OUTPATIENT
Start: 2024-09-26 | End: 2024-09-26 | Stop reason: HOSPADM

## 2024-09-26 RX ORDER — ONDANSETRON 2 MG/ML
INJECTION INTRAMUSCULAR; INTRAVENOUS PRN
Status: DISCONTINUED | OUTPATIENT
Start: 2024-09-26 | End: 2024-09-26

## 2024-09-26 RX ORDER — FENTANYL CITRATE 50 UG/ML
25 INJECTION, SOLUTION INTRAMUSCULAR; INTRAVENOUS
Status: DISCONTINUED | OUTPATIENT
Start: 2024-09-26 | End: 2024-09-26 | Stop reason: HOSPADM

## 2024-09-26 RX ORDER — FENTANYL CITRATE 50 UG/ML
50 INJECTION, SOLUTION INTRAMUSCULAR; INTRAVENOUS EVERY 5 MIN PRN
Status: DISCONTINUED | OUTPATIENT
Start: 2024-09-26 | End: 2024-09-26 | Stop reason: HOSPADM

## 2024-09-26 RX ORDER — CEFAZOLIN SODIUM/WATER 2 G/20 ML
2 SYRINGE (ML) INTRAVENOUS
Status: COMPLETED | OUTPATIENT
Start: 2024-09-26 | End: 2024-09-26

## 2024-09-26 RX ORDER — HYDROMORPHONE HCL IN WATER/PF 6 MG/30 ML
0.4 PATIENT CONTROLLED ANALGESIA SYRINGE INTRAVENOUS EVERY 5 MIN PRN
Status: DISCONTINUED | OUTPATIENT
Start: 2024-09-26 | End: 2024-09-26 | Stop reason: HOSPADM

## 2024-09-26 RX ORDER — PROPOFOL 10 MG/ML
INJECTION, EMULSION INTRAVENOUS PRN
Status: DISCONTINUED | OUTPATIENT
Start: 2024-09-26 | End: 2024-09-26

## 2024-09-26 RX ORDER — HYDROMORPHONE HCL IN WATER/PF 6 MG/30 ML
0.2 PATIENT CONTROLLED ANALGESIA SYRINGE INTRAVENOUS EVERY 5 MIN PRN
Status: DISCONTINUED | OUTPATIENT
Start: 2024-09-26 | End: 2024-09-26 | Stop reason: HOSPADM

## 2024-09-26 RX ORDER — FENTANYL CITRATE 50 UG/ML
25 INJECTION, SOLUTION INTRAMUSCULAR; INTRAVENOUS EVERY 5 MIN PRN
Status: DISCONTINUED | OUTPATIENT
Start: 2024-09-26 | End: 2024-09-26 | Stop reason: HOSPADM

## 2024-09-26 RX ORDER — SODIUM CHLORIDE, SODIUM LACTATE, POTASSIUM CHLORIDE, CALCIUM CHLORIDE 600; 310; 30; 20 MG/100ML; MG/100ML; MG/100ML; MG/100ML
INJECTION, SOLUTION INTRAVENOUS CONTINUOUS PRN
Status: DISCONTINUED | OUTPATIENT
Start: 2024-09-26 | End: 2024-09-26

## 2024-09-26 RX ORDER — HYDROCODONE BITARTRATE AND ACETAMINOPHEN 5; 325 MG/1; MG/1
1 TABLET ORAL
Status: DISCONTINUED | OUTPATIENT
Start: 2024-09-26 | End: 2024-09-26 | Stop reason: HOSPADM

## 2024-09-26 RX ORDER — FENTANYL CITRATE 50 UG/ML
INJECTION, SOLUTION INTRAMUSCULAR; INTRAVENOUS PRN
Status: DISCONTINUED | OUTPATIENT
Start: 2024-09-26 | End: 2024-09-26

## 2024-09-26 RX ADMIN — FENTANYL CITRATE 50 MCG: 50 INJECTION INTRAMUSCULAR; INTRAVENOUS at 14:35

## 2024-09-26 RX ADMIN — FENTANYL CITRATE 50 MCG: 50 INJECTION INTRAMUSCULAR; INTRAVENOUS at 13:36

## 2024-09-26 RX ADMIN — ACETAMINOPHEN 975 MG: 325 TABLET ORAL at 12:51

## 2024-09-26 RX ADMIN — ONDANSETRON 4 MG: 2 INJECTION INTRAMUSCULAR; INTRAVENOUS at 14:50

## 2024-09-26 RX ADMIN — LIDOCAINE HYDROCHLORIDE 3 ML: 10 INJECTION, SOLUTION INFILTRATION; PERINEURAL at 13:31

## 2024-09-26 RX ADMIN — FENTANYL CITRATE 50 MCG: 50 INJECTION INTRAMUSCULAR; INTRAVENOUS at 13:47

## 2024-09-26 RX ADMIN — ROCURONIUM BROMIDE 20 MG: 50 INJECTION, SOLUTION INTRAVENOUS at 13:48

## 2024-09-26 RX ADMIN — PHENYLEPHRINE HYDROCHLORIDE 100 MCG: 10 INJECTION INTRAVENOUS at 13:51

## 2024-09-26 RX ADMIN — SUGAMMADEX 200 MG: 100 INJECTION, SOLUTION INTRAVENOUS at 14:50

## 2024-09-26 RX ADMIN — DEXAMETHASONE SODIUM PHOSPHATE 10 MG: 10 INJECTION, SOLUTION INTRAMUSCULAR; INTRAVENOUS at 13:38

## 2024-09-26 RX ADMIN — PHENYLEPHRINE HYDROCHLORIDE 100 MCG: 10 INJECTION INTRAVENOUS at 14:11

## 2024-09-26 RX ADMIN — SODIUM CHLORIDE, POTASSIUM CHLORIDE, SODIUM LACTATE AND CALCIUM CHLORIDE: 600; 310; 30; 20 INJECTION, SOLUTION INTRAVENOUS at 13:28

## 2024-09-26 RX ADMIN — ONDANSETRON 4 MG: 2 INJECTION INTRAMUSCULAR; INTRAVENOUS at 13:38

## 2024-09-26 RX ADMIN — SODIUM CHLORIDE, POTASSIUM CHLORIDE, SODIUM LACTATE AND CALCIUM CHLORIDE: 600; 310; 30; 20 INJECTION, SOLUTION INTRAVENOUS at 12:53

## 2024-09-26 RX ADMIN — OXYCODONE HYDROCHLORIDE 5 MG: 5 TABLET ORAL at 17:11

## 2024-09-26 RX ADMIN — PROPOFOL 150 MCG/KG/MIN: 10 INJECTION, EMULSION INTRAVENOUS at 13:27

## 2024-09-26 RX ADMIN — FENTANYL CITRATE 25 MCG: 50 INJECTION, SOLUTION INTRAMUSCULAR; INTRAVENOUS at 15:48

## 2024-09-26 RX ADMIN — FENTANYL CITRATE 25 MCG: 50 INJECTION, SOLUTION INTRAMUSCULAR; INTRAVENOUS at 15:27

## 2024-09-26 RX ADMIN — ROCURONIUM BROMIDE 50 MG: 50 INJECTION, SOLUTION INTRAVENOUS at 13:32

## 2024-09-26 RX ADMIN — PHENYLEPHRINE HYDROCHLORIDE 100 MCG: 10 INJECTION INTRAVENOUS at 14:16

## 2024-09-26 RX ADMIN — FENTANYL CITRATE 50 MCG: 50 INJECTION INTRAMUSCULAR; INTRAVENOUS at 13:29

## 2024-09-26 RX ADMIN — LIDOCAINE HYDROCHLORIDE 2 ML: 10 INJECTION, SOLUTION INFILTRATION; PERINEURAL at 13:29

## 2024-09-26 RX ADMIN — Medication 2 G: at 13:27

## 2024-09-26 RX ADMIN — PROPOFOL 100 MG: 10 INJECTION, EMULSION INTRAVENOUS at 13:31

## 2024-09-26 RX ADMIN — SUGAMMADEX 200 MG: 100 INJECTION, SOLUTION INTRAVENOUS at 14:55

## 2024-09-26 RX ADMIN — DEXMEDETOMIDINE HYDROCHLORIDE 8 MCG: 100 INJECTION, SOLUTION INTRAVENOUS at 13:29

## 2024-09-26 RX ADMIN — Medication 10 MG: at 13:51

## 2024-09-26 RX ADMIN — ROCURONIUM BROMIDE 10 MG: 50 INJECTION, SOLUTION INTRAVENOUS at 14:37

## 2024-09-26 RX ADMIN — ALBUMIN HUMAN: 0.05 INJECTION, SOLUTION INTRAVENOUS at 13:52

## 2024-09-26 ASSESSMENT — ACTIVITIES OF DAILY LIVING (ADL)
ADLS_ACUITY_SCORE: 25
ADLS_ACUITY_SCORE: 25
ADLS_ACUITY_SCORE: 24
ADLS_ACUITY_SCORE: 33
ADLS_ACUITY_SCORE: 24
ADLS_ACUITY_SCORE: 24

## 2024-09-26 NOTE — ANESTHESIA CARE TRANSFER NOTE
Patient: Kandi Kim    Procedure: Procedure(s):  CHOLECYSTECTOMY, LAPAROSCOPIC WITH CHOLANGIOGRAMS       Diagnosis: Symptomatic cholelithiasis [K80.20]  Diagnosis Additional Information: No value filed.    Anesthesia Type:   General     Note:  Anesthesia Care Transfer Notewriter  Vitals:  Vitals Value Taken Time   /71 1510   Temp 36.1  C (96.98  F) 09/26/24 1510   Pulse 74 09/26/24 1510   Resp 21 09/26/24 1510   SpO2 97 % 09/26/24 1510   Vitals shown include unfiled device data.    Electronically Signed By: ROSE Mabry CRNA  September 26, 2024  3:11 PM

## 2024-09-26 NOTE — ANESTHESIA POSTPROCEDURE EVALUATION
Patient: Kandi Kim    Procedure: Procedure(s):  CHOLECYSTECTOMY, LAPAROSCOPIC WITH CHOLANGIOGRAMS       Anesthesia Type:  General    Note:  Disposition: Inpatient   Postop Pain Control: Uneventful            Sign Out: Well controlled pain   PONV: No   Neuro/Psych: Uneventful            Sign Out: Acceptable/Baseline neuro status   Airway/Respiratory: Uneventful            Sign Out: Acceptable/Baseline resp. status; O2 supplementation               Oxygen: Nasal Cannula   CV/Hemodynamics: Uneventful            Sign Out: Acceptable CV status; No obvious hypovolemia; No obvious fluid overload   Other NRE: NONE   DID A NON-ROUTINE EVENT OCCUR? No       Last vitals:  Vitals Value Taken Time   /72 09/26/24 1515   Temp 34.1  C (93.38  F) 09/26/24 1521   Pulse 73 09/26/24 1521   Resp 19 09/26/24 1521   SpO2 91 % 09/26/24 1521   Vitals shown include unfiled device data.    Electronically Signed By: Keyshawn Lemons MD  September 26, 2024  3:22 PM

## 2024-09-26 NOTE — ANESTHESIA PROCEDURE NOTES
Airway       Patient location during procedure: OR       Procedure Start/Stop Times: 9/26/2024 1:34 PM  Staff -        Anesthesiologist:  Bronwyn Alamo MD       CRNA: Bruno Wallace APRN CRNA       Performed By: CRNA  Consent for Airway        Urgency: elective  Indications and Patient Condition       Indications for airway management: susan-procedural       Induction type:inhalational       Mask difficulty assessment: 2 - vent by mask + OA or adjuvant +/- NMBA    Final Airway Details       Final airway type: endotracheal airway       Successful airway: ETT - single  Endotracheal Airway Details        ETT size (mm): 7.0       Cuffed: yes       Cuff volume (mL): 10       Successful intubation technique: video laryngoscopy       VL Blade Size: Glidescope 3       Grade View of Cords: 1       Adjucts: stylet       Position: Right       Measured from: lips       Secured at (cm): 20       Bite block used: None    Post intubation assessment        Placement verified by: capnometry, equal breath sounds and chest rise        Number of attempts at approach: 1       Number of other approaches attempted: 0       Secured with: tape       Ease of procedure: easy       Dentition: Intact and Unchanged    Medication(s) Administered   Medication Administration Time: 9/26/2024 1:34 PM

## 2024-09-26 NOTE — OP NOTE
[facility]    Operative Note    Pre-operative diagnosis: Chronic cholecystitis   Post-operative diagnosis Same   Procedure: Procedure(s):  CHOLECYSTECTOMY, LAPAROSCOPIC WITH CHOLANGIOGRAMS   Surgeon: Surgeons and Role:     * Syd Smith MD - Primary   Anesthesia: General    Estimated blood loss: Minimal   Drains: None   Specimens: ID Type Source Tests Collected by Time Destination   1 : Gallbladder Tissue Gallbladder SURGICAL PATHOLOGY EXAM Syd Smith MD 9/26/2024  2:37 PM       Findings: The gallbladder was moderately inflamed; The gallbladder wall was thickened; cholangiogram was negative.   Complications: None.   Implants: None.       OPERATIVE INDICATIONS:  Kandi Kim is a 69 year old female with a history of RUQ abdominal pain associated with sludge and gallbladder wasll thickening on right upper quadrant ultrasound.  LFTs were mildly elevated.    After understanding the risks and benefits of proceeding with surgery, the patient has an indication for laparoscopic cholecystectomy and consented to undergo surgery.    Prior to surgery, I reviewed the risks of gallbladder removal with this patient.    The risk discussion included, but was not limited to, death, myocardial infarction, pneumonia, urinary tract infection, deep venous thrombosis with or without pulmonary embolus, abdominal infection from bowel injury or abscess, bowel obstruction, wound infection, and bleeding.    Specific risks related to cholecystectomy include bile duct injury (3-4/1000), bile leak (10/1000), retained common bile duct stone (10/1000), postcholecystectomy diarrhea (1-2%) and these complications may require additional treatment.    The potential that gallbladder removal will NOT be of benefit was also reviewed.    Furthermore, alternative therapies and the option for no therapy were also reviewed.    Postoperative treatment and expected follow-up were also reviewed.    OPERATIVE  DETAILS:      The patient was brought to the operating room and prepared in a routine fashion.  A timeout was performed prior to surgery and documented by the nursing team.     Under the benefits of general anesthesia, and supra-umbilical incision was made and the umbilical stalk grasped and lifted.  A Veress needle was inserted and pneumoperitoneum was established using carbon dioxide gas to a maximum pressure of 15 mmHg.  A total of 4 ports were placed and the laparoscope was utilized from the umbilical port.     The patient was moved into a steep reverse Trendelenberg position.    The gallbladder was grasped at its fundus and retracted cephalad.  It was also grasped at its infundibulum and retracted laterally.       Findings related to the gallbladder are as noted above.     A complete dissection starting on the gallbladder, identifying the cystic artery on the surface of the gallbladder, was performed.  The cystic artery in this manner was  away from the gallbladder. The infundibulum of the gallbladder and the junction of gallbladder and cystic duct were clearly and completely identified with blunt dissection.  All of this dissection was performed on the gallbladder wall and clearly above the triangle of Calot.    The peritoneum on each side of gallbladder was divided at least one half of the way up towards the top of the gallbladder.     Next, a complete dissection of the upper aspect of the triangle of Calot was performed and the critical view of safety was achieved.  The cystic artery and cystic duct were then the ONLY two structures traversing from gallbladder towards the edwin hepatis.     The cystic artery was clipped securely and divided between clips.     A small nick in the cystic duct was made sharply.  An 18 gauge cholangiocatheter was inserted though the anterior abdominal wall and taut catheter through this.  The taut catheter was inserted into the cystic duct and secured with a clip.  A  cholangiogram demonstrated filling of the duodenum and both biliary radicals.  No filling defects were noted.  That said, the distal CBD was obscured due to the duodenum full of contrast and it coursing a bit anteriorly.  As such, I could not assess for filling defects here.    The taut catheter was removed and the cystic artery was clipped securely and divided between clips.  The gallbladder was then removed out of its fossa using electrocautery.  It was placed into an Endocatch bag and removed from the abdomen.     Next, the gallbladder fossa was irrigated with saline and the saline was aspirated.     Complete hemostasis was achieved.     The epigastric incision was closed using 0 Vicryl suture.     The skin was closed using 4-0 monocryl suture and sterile dressing was applied.     All needle and sponge counts were correct x2 at the end of the procedure.    Syd Smith MD

## 2024-09-26 NOTE — ANESTHESIA PREPROCEDURE EVALUATION
Anesthesia Pre-Procedure Evaluation    Patient: Kandi Kim   MRN: [id number] : 1955        Procedure : Procedure(s):  CHOLECYSTECTOMY, LAPAROSCOPIC,  , POSSIBLE CHOLANGIOGRAM          Past Medical History:   Diagnosis Date    Acute torn meniscus of knee, left, initial encounter 2018    Acute torn meniscus of knee, left, sequela 2019    Balance problems 2023    Bilateral hearing loss 2021    Hearing aids    Biliary colic     Chest pain 2022    Chronic foot pain, left 2024    Chronic left shoulder pain 2021    Chronic sinusitis     Class 2 severe obesity with serious comorbidity and body mass index (BMI) of 37.0 to 37.9 in adult (H) 2022    Depression     Diverticulitis 2016    CT scan normal    Epiploic appendagitis 2024    Gastroesophageal reflux disease     GERD with esophagitis     Complaints of dysphagia with EGD showing esophagitis.  Negative for eosinophilic esophagitis 2019    History of colon polyps     Colonoscopy 2018 with multiple polyps.  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  Added automatically from request for surgery [id number]    Influenza A 2022    Left bundle branch block     Migraines     Open-angle glaucoma of both eyes 2022    HONEY (obstructive sleep apnea)     Osteoarthritis     knees, hands    PONV (postoperative nausea and vomiting)     Postmenopausal symptoms     Primary osteoarthritis involving multiple joints 2022    Bilateral TKA and chronic pain involving left ankle and right shoulder    Primary osteoarthritis of both knees 2021    Bilateral TKA    Primary osteoarthritis of right  knee 04/12/2016    R TKA    Rectal bleeding 08/09/2016    Rectocele 11/11/2016    Surgery is planned    Screening     DEXA normal 2016 and normal 2022    Situational anxiety     Status post total bilateral knee replacement 03/14/2022    Tear of right rotator cuff 06/22/2023    Shoulder surg 2023      Past Surgical History:   Procedure Laterality Date    ARTHRODESIS ANKLE Left 5/20/2024    Procedure: LEFT TALONAVICULAR FUSION;  Surgeon: Nila yTler MD;  Location:  OR    ARTHROPLASTY KNEE Right 04/01/2016    ARTHROPLASTY KNEE Left     jan 2020    ARTHROSCOPY KNEE Bilateral 2011 2014    BUNIONECTOMY      BUNIONECTOMY Left 5/20/2024    Procedure: AND LEFT BUNION REPAIR;  Surgeon: Nila Tyler MD;  Location:  OR    COLPORRHAPHY N/A 02/21/2017    Procedure: POSTERIOR COLPORRHAPHY;  Surgeon: Roxana Frias MD;  Location: [facility];  Service:     HYSTERECTOMY VAGINAL N/A 02/21/2017    Procedure: TOTAL VAGINAL HYSTERECTOMY, BILATERAL SALPINGO-OOPHORECTOMY;  Surgeon: Roxana Frias MD;  Location: [facility] OR;  Service:     MIDDLE EAR SURGERY      NEPHRECTOMY Left     Kidney donor    OOPHORECTOMY      RECESSION GASTROCNEMIUS Left 5/20/2024    Procedure: AND LEFT GASTROC SLIDE;  Surgeon: Nila Tyler MD;  Location:  OR    SHOULDER ARTHROSCOPY W/ ROTATOR CUFF REPAIR Right     TUBAL LIGATION        Allergies   Allergen Reactions    Synvisc [Hylan G-F 20]       Social History     Tobacco Use    Smoking status: Never     Passive exposure: Never    Smokeless tobacco: Never   Substance Use Topics    Alcohol use: Yes     Comment: Alcoholic Drinks/day: infrequent      Wt Readings from Last 1 Encounters:   09/26/24 88.5 kg (195 lb)        Anesthesia Evaluation   Pt has had prior anesthetic.     History of anesthetic complications  - PONV.      ROS/MED HX  ENT/Pulmonary:     (+) sleep apnea,                                       Neurologic:  - neg neurologic ROS     Cardiovascular:  "Comment:    The left ventricle is normal in size.  Biplane LVEF is 61%.  Septal motion is consistent with conduction abnormality.  No hemodynamically significant valvular abnormalities on 2D or color flow  imaging.  RVSP 32mmHg  Compared to the prior study dated 9/12/2005 (report in Care Everywhere) no  significant change      (+)  hypertension- -   -  - -                                      METS/Exercise Tolerance:     Hematologic:  - neg hematologic  ROS     Musculoskeletal:  - neg musculoskeletal ROS     GI/Hepatic:     (+) GERD,                   Renal/Genitourinary:  - neg Renal ROS     Endo:     (+)          thyroid problem,     Obesity,       Psychiatric/Substance Use:  - neg psychiatric ROS     Infectious Disease:       Malignancy:       Other:            Physical Exam    Airway  airway exam normal      Mallampati: II   TM distance: > 3 FB   Neck ROM: full   Mouth opening: > 3 cm    Respiratory Devices and Support         Dental       (+) Minor Abnormalities - some fillings, tiny chips      Cardiovascular   cardiovascular exam normal          Pulmonary   pulmonary exam normal                OUTSIDE LABS:  CBC:   Lab Results   Component Value Date    WBC 6.0 09/24/2024    WBC 10.2 09/14/2024    HGB 14.1 09/24/2024    HGB 13.6 09/14/2024    HCT 42.1 09/24/2024    HCT 39.8 09/14/2024     09/24/2024     09/14/2024     BMP:   Lab Results   Component Value Date     09/24/2024     09/14/2024    POTASSIUM 4.6 09/24/2024    POTASSIUM 4.0 09/14/2024    CHLORIDE 106 09/24/2024    CHLORIDE 100 09/14/2024    CO2 24 09/24/2024    CO2 24 09/14/2024    BUN 17.3 09/24/2024    BUN 15.0 09/14/2024    CR 1.03 (H) 09/24/2024    CR 1.01 (H) 09/14/2024     (H) 09/24/2024     (H) 09/14/2024     COAGS:   Lab Results   Component Value Date    PTT 30 09/26/2023    INR 0.95 09/26/2023     POC: No results found for: \"BGM\", \"HCG\", \"HCGS\"  HEPATIC:   Lab Results   Component Value Date    ALBUMIN " "4.3 09/24/2024    PROTTOTAL 7.3 09/24/2024    ALT 48 09/24/2024    AST 25 09/24/2024    ALKPHOS 167 (H) 09/24/2024    BILITOTAL 0.6 09/24/2024     OTHER:   Lab Results   Component Value Date    A1C 5.6 09/26/2023    JERICA 9.9 09/24/2024    MAG 2.1 02/13/2024    LIPASE 51 09/14/2024    TSH 1.30 09/24/2024    T4 1.07 09/26/2023       Anesthesia Plan    ASA Status:  2    NPO Status:  NPO Appropriate    Anesthesia Type: General.     - Airway: ETT   Induction: Intravenous.   Maintenance: TIVA.        Consents    Anesthesia Plan(s) and associated risks, benefits, and realistic alternatives discussed. Questions answered and patient/representative(s) expressed understanding.     - Discussed:     - Discussed with:  Patient      - Extended Intubation/Ventilatory Support Discussed: No.      - Patient is DNR/DNI Status: No          Postoperative Care    Pain management: Multi-modal analgesia.   PONV prophylaxis: Ondansetron (or other 5HT-3), Dexamethasone or Solumedrol     Comments:               Bronwyn Alamo MD    I have reviewed the pertinent notes and labs in the chart from the past 30 days and (re)examined the patient.  Any updates or changes from those notes are reflected in this note.              # Obesity: Estimated body mass index is 35.67 kg/m  as calculated from the following:    Height as of 9/24/24: 1.575 m (5' 2\").    Weight as of this encounter: 88.5 kg (195 lb).      "

## 2024-09-26 NOTE — INTERVAL H&P NOTE
"I have reviewed the surgical (or preoperative) H&P that is linked to this encounter, and examined the patient. There are no significant changes    Again discussed risks/benefits/alternatives.  If straight forward will do cholangiogram.  With normal bili throughout and now normalization of LFTs would forgo if challenge.  Risks and benefits of surgery explained and they wish to proceed.    Clinical Conditions Present on Arrival:  Clinically Significant Risk Factors Present on Admission                      # Obesity: Estimated body mass index is 35.67 kg/m  as calculated from the following:    Height as of 9/24/24: 1.575 m (5' 2\").    Weight as of this encounter: 88.5 kg (195 lb).       "

## 2024-09-27 LAB
PATH REPORT.COMMENTS IMP SPEC: NORMAL
PATH REPORT.COMMENTS IMP SPEC: NORMAL
PATH REPORT.FINAL DX SPEC: NORMAL
PATH REPORT.GROSS SPEC: NORMAL
PATH REPORT.MICROSCOPIC SPEC OTHER STN: NORMAL
PATH REPORT.RELEVANT HX SPEC: NORMAL
PHOTO IMAGE: NORMAL

## 2024-10-01 ENCOUNTER — TELEPHONE (OUTPATIENT)
Dept: SURGERY | Facility: CLINIC | Age: 69
End: 2024-10-01
Payer: COMMERCIAL

## 2024-10-01 NOTE — TELEPHONE ENCOUNTER
[facility] Post-Op Phone Call                    Surgeon: Syd Smith MD    Surgery: Laparoscopic Cholecystectomy with Cholangiograms  Date of Surgery: 9/26/2024  Discharge Date: 9/26/2024    Date/Time Called:   Date: 10/1/2024 Time: 9:53 AM   Attempt: First    RN called patient to complete post-op call. No answer and left message for patient to call clinic for any questions or concerns regarding recovery.    Yasmine VÁSQUEZ RN, BSN  
English

## 2024-10-03 ENCOUNTER — MYC MEDICAL ADVICE (OUTPATIENT)
Dept: SURGERY | Facility: CLINIC | Age: 69
End: 2024-10-03

## 2024-10-04 ENCOUNTER — TELEPHONE (OUTPATIENT)
Dept: SURGERY | Facility: CLINIC | Age: 69
End: 2024-10-04
Payer: COMMERCIAL

## 2024-10-04 DIAGNOSIS — R11.0 POSTOPERATIVE NAUSEA: Primary | ICD-10-CM

## 2024-10-04 DIAGNOSIS — Z98.890 POSTOPERATIVE NAUSEA: Primary | ICD-10-CM

## 2024-10-04 RX ORDER — ONDANSETRON 4 MG/1
4 TABLET, ORALLY DISINTEGRATING ORAL EVERY 6 HOURS PRN
Qty: 10 TABLET | Refills: 0 | Status: SHIPPED | OUTPATIENT
Start: 2024-10-04

## 2024-10-04 NOTE — TELEPHONE ENCOUNTER
Attempted to reach patient to discuss My Chart message from today. Pt has complaints of nausea and vomiting. She has requested a Rx for Zofran. Will message pt back on My Chart to obtain further information in her symptoms. In the interim, will send Rx for Zofran to her pharmacy.

## 2024-10-04 NOTE — TELEPHONE ENCOUNTER
Returned call to Kandi. She reports an episode of diarrhea yesterday that caused feelings of nausea and she actually vomited. She states today she is feeling better. Discussed diet choices of easily digestible foods to help with nausea. Encouraged her to drink plenty of water. Rx for Zofran was sent to the Pharmacy. Provided clinic phone number for follow up if needed.

## 2024-11-01 SDOH — HEALTH STABILITY: PHYSICAL HEALTH: ON AVERAGE, HOW MANY DAYS PER WEEK DO YOU ENGAGE IN MODERATE TO STRENUOUS EXERCISE (LIKE A BRISK WALK)?: 0 DAYS

## 2024-11-01 SDOH — HEALTH STABILITY: PHYSICAL HEALTH: ON AVERAGE, HOW MANY MINUTES DO YOU ENGAGE IN EXERCISE AT THIS LEVEL?: 0 MIN

## 2024-11-01 ASSESSMENT — SOCIAL DETERMINANTS OF HEALTH (SDOH): HOW OFTEN DO YOU GET TOGETHER WITH FRIENDS OR RELATIVES?: TWICE A WEEK

## 2024-11-06 ENCOUNTER — OFFICE VISIT (OUTPATIENT)
Dept: INTERNAL MEDICINE | Facility: CLINIC | Age: 69
End: 2024-11-06
Payer: COMMERCIAL

## 2024-11-06 VITALS
RESPIRATION RATE: 18 BRPM | HEART RATE: 81 BPM | OXYGEN SATURATION: 96 % | WEIGHT: 189 LBS | BODY MASS INDEX: 34.78 KG/M2 | SYSTOLIC BLOOD PRESSURE: 120 MMHG | TEMPERATURE: 97.9 F | DIASTOLIC BLOOD PRESSURE: 70 MMHG | HEIGHT: 62 IN

## 2024-11-06 DIAGNOSIS — E03.9 ACQUIRED HYPOTHYROIDISM: ICD-10-CM

## 2024-11-06 DIAGNOSIS — Z86.0100 HISTORY OF COLON POLYPS: ICD-10-CM

## 2024-11-06 DIAGNOSIS — I10 PRIMARY HYPERTENSION: ICD-10-CM

## 2024-11-06 DIAGNOSIS — G47.33 OSA (OBSTRUCTIVE SLEEP APNEA): ICD-10-CM

## 2024-11-06 DIAGNOSIS — R21 RASH: ICD-10-CM

## 2024-11-06 DIAGNOSIS — Z96.653 STATUS POST TOTAL BILATERAL KNEE REPLACEMENT: ICD-10-CM

## 2024-11-06 DIAGNOSIS — E78.5 HYPERLIPIDEMIA, UNSPECIFIED HYPERLIPIDEMIA TYPE: ICD-10-CM

## 2024-11-06 DIAGNOSIS — F32.A MILD DEPRESSION: ICD-10-CM

## 2024-11-06 DIAGNOSIS — K21.00 GASTROESOPHAGEAL REFLUX DISEASE WITH ESOPHAGITIS WITHOUT HEMORRHAGE: ICD-10-CM

## 2024-11-06 DIAGNOSIS — E66.811 CLASS 1 OBESITY DUE TO EXCESS CALORIES WITH SERIOUS COMORBIDITY AND BODY MASS INDEX (BMI) OF 34.0 TO 34.9 IN ADULT: ICD-10-CM

## 2024-11-06 DIAGNOSIS — K80.50 CHOLEDOCHOLITHIASIS: ICD-10-CM

## 2024-11-06 DIAGNOSIS — Z90.5 SOLITARY KIDNEY, ACQUIRED: ICD-10-CM

## 2024-11-06 DIAGNOSIS — H90.3 SENSORINEURAL HEARING LOSS (SNHL) OF BOTH EARS: ICD-10-CM

## 2024-11-06 DIAGNOSIS — I44.7 LBBB (LEFT BUNDLE BRANCH BLOCK): ICD-10-CM

## 2024-11-06 DIAGNOSIS — Z00.00 ENCOUNTER FOR MEDICARE ANNUAL WELLNESS EXAM: Primary | ICD-10-CM

## 2024-11-06 DIAGNOSIS — E66.09 CLASS 1 OBESITY DUE TO EXCESS CALORIES WITH SERIOUS COMORBIDITY AND BODY MASS INDEX (BMI) OF 34.0 TO 34.9 IN ADULT: ICD-10-CM

## 2024-11-06 DIAGNOSIS — M15.0 PRIMARY OSTEOARTHRITIS INVOLVING MULTIPLE JOINTS: ICD-10-CM

## 2024-11-06 PROBLEM — E66.01 CLASS 2 SEVERE OBESITY WITH SERIOUS COMORBIDITY AND BODY MASS INDEX (BMI) OF 37.0 TO 37.9 IN ADULT (H): Status: RESOLVED | Noted: 2022-07-21 | Resolved: 2024-11-06

## 2024-11-06 PROBLEM — E66.812 CLASS 2 SEVERE OBESITY WITH SERIOUS COMORBIDITY AND BODY MASS INDEX (BMI) OF 37.0 TO 37.9 IN ADULT (H): Status: RESOLVED | Noted: 2022-07-21 | Resolved: 2024-11-06

## 2024-11-06 LAB
ALBUMIN SERPL BCG-MCNC: 4.6 G/DL (ref 3.5–5.2)
ALBUMIN UR-MCNC: NEGATIVE MG/DL
ALP SERPL-CCNC: 139 U/L (ref 40–150)
ALT SERPL W P-5'-P-CCNC: 20 U/L (ref 0–50)
ANION GAP SERPL CALCULATED.3IONS-SCNC: 14 MMOL/L (ref 7–15)
APPEARANCE UR: CLEAR
AST SERPL W P-5'-P-CCNC: 23 U/L (ref 0–45)
BACTERIA #/AREA URNS HPF: ABNORMAL /HPF
BILIRUB SERPL-MCNC: 0.8 MG/DL
BILIRUB UR QL STRIP: NEGATIVE
BUN SERPL-MCNC: 15.3 MG/DL (ref 8–23)
CALCIUM SERPL-MCNC: 10.2 MG/DL (ref 8.8–10.4)
CHLORIDE SERPL-SCNC: 104 MMOL/L (ref 98–107)
CHOLEST SERPL-MCNC: 173 MG/DL
COLOR UR AUTO: YELLOW
CREAT SERPL-MCNC: 0.99 MG/DL (ref 0.51–0.95)
EGFRCR SERPLBLD CKD-EPI 2021: 62 ML/MIN/1.73M2
ERYTHROCYTE [DISTWIDTH] IN BLOOD BY AUTOMATED COUNT: 12.2 % (ref 10–15)
FASTING STATUS PATIENT QL REPORTED: ABNORMAL
FASTING STATUS PATIENT QL REPORTED: ABNORMAL
GLUCOSE SERPL-MCNC: 104 MG/DL (ref 70–99)
GLUCOSE UR STRIP-MCNC: NEGATIVE MG/DL
HCO3 SERPL-SCNC: 22 MMOL/L (ref 22–29)
HCT VFR BLD AUTO: 43 % (ref 35–47)
HDLC SERPL-MCNC: 50 MG/DL
HGB BLD-MCNC: 14.1 G/DL (ref 11.7–15.7)
HGB UR QL STRIP: ABNORMAL
KETONES UR STRIP-MCNC: NEGATIVE MG/DL
LDLC SERPL CALC-MCNC: 91 MG/DL
LEUKOCYTE ESTERASE UR QL STRIP: ABNORMAL
MCH RBC QN AUTO: 29.3 PG (ref 26.5–33)
MCHC RBC AUTO-ENTMCNC: 32.8 G/DL (ref 31.5–36.5)
MCV RBC AUTO: 89 FL (ref 78–100)
NITRATE UR QL: NEGATIVE
NONHDLC SERPL-MCNC: 123 MG/DL
PH UR STRIP: 5.5 [PH] (ref 5–8)
PLATELET # BLD AUTO: 295 10E3/UL (ref 150–450)
POTASSIUM SERPL-SCNC: 4.4 MMOL/L (ref 3.4–5.3)
PROT SERPL-MCNC: 7.5 G/DL (ref 6.4–8.3)
RBC # BLD AUTO: 4.82 10E6/UL (ref 3.8–5.2)
RBC #/AREA URNS AUTO: ABNORMAL /HPF
SODIUM SERPL-SCNC: 140 MMOL/L (ref 135–145)
SP GR UR STRIP: 1.02 (ref 1–1.03)
SQUAMOUS #/AREA URNS AUTO: ABNORMAL /LPF
TRIGL SERPL-MCNC: 159 MG/DL
TSH SERPL DL<=0.005 MIU/L-ACNC: 0.1 UIU/ML (ref 0.3–4.2)
UROBILINOGEN UR STRIP-ACNC: 0.2 E.U./DL
WBC # BLD AUTO: 5.8 10E3/UL (ref 4–11)
WBC #/AREA URNS AUTO: ABNORMAL /HPF

## 2024-11-06 PROCEDURE — 84439 ASSAY OF FREE THYROXINE: CPT | Performed by: INTERNAL MEDICINE

## 2024-11-06 PROCEDURE — 80061 LIPID PANEL: CPT | Performed by: INTERNAL MEDICINE

## 2024-11-06 PROCEDURE — 83036 HEMOGLOBIN GLYCOSYLATED A1C: CPT | Performed by: INTERNAL MEDICINE

## 2024-11-06 PROCEDURE — 84443 ASSAY THYROID STIM HORMONE: CPT | Performed by: INTERNAL MEDICINE

## 2024-11-06 PROCEDURE — 99214 OFFICE O/P EST MOD 30 MIN: CPT | Mod: 25 | Performed by: INTERNAL MEDICINE

## 2024-11-06 PROCEDURE — 85027 COMPLETE CBC AUTOMATED: CPT | Performed by: INTERNAL MEDICINE

## 2024-11-06 PROCEDURE — 36415 COLL VENOUS BLD VENIPUNCTURE: CPT | Performed by: INTERNAL MEDICINE

## 2024-11-06 PROCEDURE — 81001 URINALYSIS AUTO W/SCOPE: CPT | Performed by: INTERNAL MEDICINE

## 2024-11-06 PROCEDURE — 80053 COMPREHEN METABOLIC PANEL: CPT | Performed by: INTERNAL MEDICINE

## 2024-11-06 PROCEDURE — G0439 PPPS, SUBSEQ VISIT: HCPCS | Performed by: INTERNAL MEDICINE

## 2024-11-06 RX ORDER — TRIAMCINOLONE ACETONIDE 1 MG/G
CREAM TOPICAL 2 TIMES DAILY
Qty: 45 G | Refills: 1 | Status: SHIPPED | OUTPATIENT
Start: 2024-11-06 | End: 2024-11-20

## 2024-11-06 RX ORDER — LISINOPRIL 10 MG/1
10 TABLET ORAL DAILY
Qty: 90 TABLET | Refills: 3 | Status: SHIPPED | OUTPATIENT
Start: 2024-11-06

## 2024-11-06 NOTE — PROGRESS NOTES
Preventive Care Visit  [facility]  Ho Quezada MD, Internal Medicine  Nov 6, 2024  {Provider  Link to SmartSet :782886}    {PROVIDER CHARTING PREFERENCE:112409}    Nhung Chau is a 69 year old, presenting for the following:  Wellness Visit (Pt is fasting) and Rash (On upper arms for 1 month, little itchy)        11/6/2024    12:40 PM   Additional Questions   Roomed by Trish GERARDO   {ROOMER positive Fall Risk- Gait Speed Test is required click here to document the Gait Speed Test and then refresh the note to pull in results  :399302}  {ROOMER if patient is in their first year of Medicare a vision screen is required click here to document the Vison screen and then refresh the note to pull in results  :017883}      Rash  Associated symptoms include a rash.     ***  {MA/LPN/RN Pre-Provider Visit Orders- hCG/UA/Strep (Optional):909198}  {SUPERLIST (Optional):331670}  {additonal problems for provider to add (Optional):765660}  Health Care Directive  Patient does not have a Health Care Directive: Patient states has Advance Directive and will bring in a copy to clinic.      11/1/2024   General Health   How would you rate your overall physical health? Good   Feel stress (tense, anxious, or unable to sleep) Only a little      (!) STRESS CONCERN      11/1/2024   Nutrition   Diet: Regular (no restrictions)            11/1/2024   Exercise   Days per week of moderate/strenous exercise 0 days   Average minutes spent exercising at this level 0 min      (!) EXERCISE CONCERN      11/1/2024   Social Factors   Frequency of gathering with friends or relatives Twice a week   Worry food won't last until get money to buy more No    No   Food not last or not have enough money for food? No    No   Do you have housing? (Housing is defined as stable permanent housing and does not include staying ouside in a car, in a tent, in an abandoned building, in an overnight shelter, or couch-surfing.) Yes    Yes    Are you worried about losing your housing? No    No   Lack of transportation? No    No   Unable to get utilities (heat,electricity)? No    No       Multiple values from one day are sorted in reverse-chronological order         11/1/2024   Fall Risk   Fallen 2 or more times in the past year? Yes     Yes    Trouble with walking or balance? Yes     Yes        Patient-reported    Multiple values from one day are sorted in reverse-chronological order     {Positive Fall Risk- Gait Speed Test is required and was not documented before note was started.  If results do not appear, ask staff to complete.  Once completed, refresh note to pull in results Click here to link Gait Speed Test  :913055}      11/1/2024   Activities of Daily Living- Home Safety   Needs help with the following daily activites None of the above   Safety concerns in the home None of the above            11/1/2024   Dental   Dentist two times every year? Yes            11/1/2024   Hearing Screening   Hearing concerns? None of the above            11/1/2024   Driving Risk Screening   Patient/family members have concerns about driving No            11/1/2024   General Alertness/Fatigue Screening   Have you been more tired than usual lately? No            11/1/2024   Urinary Incontinence Screening   Bothered by leaking urine in past 6 months Yes            11/1/2024   TB Screening   Were you born outside of the [place]? No            Today's PHQ-2 Score:       11/6/2024    12:22 PM   PHQ-2 ( 1999 Pf[employer])   Q1: Little interest or pleasure in doing things 0    Q2: Feeling down, depressed or hopeless 0    PHQ-2 Score 0    Q1: Little interest or pleasure in doing things Not at all   Q2: Feeling down, depressed or hopeless Not at all   PHQ-2 Score 0       Patient-reported           11/1/2024   Substance Use   Alcohol more than 3/day or more than 7/wk No   Do you have a current opioid prescription? No   How severe/bad is pain from 1 to 10? 4/10   Do you use any other  substances recreationally? No        Social History     Tobacco Use    Smoking status: Never     Passive exposure: Never    Smokeless tobacco: Never   Vaping Use    Vaping status: Never Used   Substance Use Topics    Alcohol use: Yes     Comment: Alcoholic Drinks/day: infrequent    Drug use: Never     {Provider  If there are gaps in the social history shown above, please follow the link to update and then refresh the note Link to Social and Substance History :043301}      5/9/2024   LAST FHS-7 RESULTS   1st degree relative breast or ovarian cancer Yes   Any relative bilateral breast cancer No   Any male have breast cancer No   Any ONE woman have BOTH breast AND ovarian cancer No   Any woman with breast cancer before 50yrs No   2 or more relatives with breast AND/OR ovarian cancer No   2 or more relatives with breast AND/OR bowel cancer No        {If any of the questions to the FHS7 are answered yes, consider referral for genetic counseling.    Additional indications for genetic referral include personal history of breast or ovarian cancer, genetic mutation in 1st degree relative which increases risk of breast cancer including BRCA1, BRCA2, CARLOS, PALB 2, TP53, CHEK2, PTEN, CDH1, STK11 (per ACS) and/or 1st degree relative with history of pancreatic or high-risk prostate cancer (per NCCN):038802}   {Mammogram Decision Support (Optional):107645}      History of abnormal Pap smear: { :208254}       ASCVD Risk   The 10-year ASCVD risk score (Neeraj PULIDO, et al., 2019) is: 8.8%    Values used to calculate the score:      Age: 69 years      Sex: Female      Is Non- : No      Diabetic: No      Tobacco smoker: No      Systolic Blood Pressure: 120 mmHg      Is BP treated: Yes      HDL Cholesterol: 48 mg/dL      Total Cholesterol: 217 mg/dL    {Link to Fracture Risk Assessment Tool (Optional):790449}    {Provider  REQUIRED FOR AWV Use the storyboard to review patient history, after sections have  been marked as reviewed, refresh note to capture documentation:808221}  {Provider   REQUIRED AWV use this link to review and update sexual activity history  after section has been marked as reviewed, refresh note to capture documentation:089973}  Reviewed and updated as needed this visit by Provider                    {HISTORY OPTIONS (Optional):489909}  Current providers sharing in care for this patient include:  Patient Care Team:  Ho Quezada MD as PCP - General  Ho Quezada MD as Assigned PCP  Hemanth Painting DO as Physician (Cardiovascular Disease)  Syd Smith MD as MD (Surgery)  Roz Chen APRN CNP as Nurse Practitioner (Emergency Medicine)  Hemanth Painting DO as Assigned Heart and Vascular Provider  Syd Smith MD as Assigned Surgical Provider    The following health maintenance items are reviewed in Epic and correct as of today:  Health Maintenance   Topic Date Due    RSV VACCINE (1 - Risk 60-74 years 1-dose series) Never done    ANNUAL REVIEW OF HM ORDERS  07/07/2024    COVID-19 Vaccine (4 - 2024-25 season) 09/01/2024    MEDICARE ANNUAL WELLNESS VISIT  09/26/2024    LIPID  09/26/2024    ZOSTER IMMUNIZATION (2 of 2) 12/05/2024    BMP  09/24/2025    TSH W/FREE T4 REFLEX  09/24/2025    FALL RISK ASSESSMENT  11/06/2025    MAMMO SCREENING  05/09/2026    GLUCOSE  09/24/2027    ADVANCE CARE PLANNING  09/26/2028    COLORECTAL CANCER SCREENING  02/25/2029    DTAP/TDAP/TD IMMUNIZATION (3 - Td or Tdap) 05/06/2034    DEXA  08/05/2037    HEPATITIS C SCREENING  Completed    PHQ-2 (once per calendar year)  Completed    INFLUENZA VACCINE  Completed    Pneumococcal Vaccine: 65+ Years  Completed    HPV IMMUNIZATION  Aged Out    MENINGITIS IMMUNIZATION  Aged Out    RSV MONOCLONAL ANTIBODY  Aged Out       {ROS Picklists (Optional):929020}     Objective    Exam  /70 (BP Location: Right arm, Patient Position: Sitting)   Pulse 81   Temp 97.9  F (36.6  C)   Resp 18   Ht 1.575  "m (5' 2\")   Wt 85.7 kg (189 lb)   LMP  (LMP Unknown)   SpO2 96%   BMI 34.57 kg/m     Estimated body mass index is 34.57 kg/m  as calculated from the following:    Height as of this encounter: 1.575 m (5' 2\").    Weight as of this encounter: 85.7 kg (189 lb).    Physical Exam  {Exam Choices (Optional):418149}  {Provider  The Mini-Cog is incomplete, use link to complete and refresh note Link to Mini-Cog :772212}       No data to display              {A Mini-Cog total score of 0-2 suggests the possibility of dementia, score of 3-5 suggests no dementia:610294}         Signed Electronically by: Ho Quezada MD  {Email feedback regarding this note to [email or web address]   :551544}  "

## 2024-11-06 NOTE — PROGRESS NOTES
Preventive Care Visit  [facility]  Ho Quezada MD, Internal Medicine  Nov 6, 2024      Assessment & Plan     Encounter for Medicare annual wellness exam  Immunizations are reviewed and recommending RSV vaccine this fall.  She will also need to get her second Shingrix completed after December 10.  She has a living will.  Non-smoker.  Minimal alcohol use.  Regular exercise and good diet habits to maintain a healthy weight discussed.  Up to date with colonoscopies and this should be repeated in March 2027.  Recommending annual mammogram for breast cancer screening.   Last DEXA was 2022 and was normal. Dementia and depression screening completed.  She is seeing her eye doctor every 6 months.  Recommending seeing a dentist every 6 months.  Skin exam performed and recommending regular use of sunblock.  We discussed seeing a dermatologist every 1 to 2 years.  Hepatitis C antibody for screening was normal.  Will screen for diabetes with fasting glucose.     Primary hypertension  Blood pressure is well-controlled with her current dose of lisinopril.  Tolerating medication without side effects.  Checking appropriate labs.  - lisinopril (ZESTRIL) 10 MG tablet; Take 1 tablet (10 mg) by mouth daily.  - CBC with platelets; Future  - Comprehensive metabolic panel (BMP + Alb, Alk Phos, ALT, AST, Total. Bili, TP); Future  - UA Macroscopic with reflex to Microscopic and Culture - Lab Collect; Future    Class 1 obesity due to excess calories with serious comorbidity and body mass index (BMI) of 34.0 to 34.9 in adult  She has lost some weight since her surgery and significant weight loss over the past year.  Encouraging continued efforts at healthy diet and getting regular exercise.  She plans to join the gym.    Acquired hypothyroidism  Will recheck TSH and adjust dose of levothyroxine as needed  - TSH with free T4 reflex; Future    Hyperlipidemia, unspecified hyperlipidemia type  Recheck lipid  profile taking atorvastatin 20 mg daily  - Lipid Profile (Chol, Trig, HDL, LDL calc); Future    Choledocholithiasis  Recent hospitalization with choledocholithiasis.  Underwent cholecystectomy.  Still having some decreased appetite although on questioning whether this may be related to stress.  Exam is normal without tenderness.  Checking appropriate labs.  - CBC with platelets; Future  - Comprehensive metabolic panel (BMP + Alb, Alk Phos, ALT, AST, Total. Bili, TP); Future    Rash  She has developed a new rash on both arms but primarily on her left.  On exam there are several well demarcated erythematous round lesions with rough texture.  Psoriasis is a consideration but I suspect this is more dermatitis.  Recommending triamcinolone cream twice daily for the next 2 weeks.  - triamcinolone (KENALOG) 0.1 % external cream; Apply topically 2 times daily for 14 days. to affected area on arms    LBBB  Found to have new left bundle branch block and underwent cardiac evaluation including normal stress test and normal echocardiogram     Mild depression  Mood is stable with PHQ 2 score of 0.  She continues on the same dose of citalopram 20 mg daily and tolerating without side effects    Primary osteoarthritis involving multiple joints  She has had both knees replaced    Status post total bilateral knee replacement  As above    Gastroesophageal reflux disease with esophagitis without hemorrhage  Reflux symptoms well-controlled taking omeprazole daily  - omeprazole (PRILOSEC) 20 MG DR capsule; Take 1 capsule (20 mg) by mouth daily.    HONEY (obstructive sleep apnea)  She wears a mouth appliance    Solitary kidney, acquired  History of being a kidney donor    Sensorineural hearing loss (SNHL) of both ears  She has hearing aids    History of colon polyps  History of adenomatous polyps.  Recommending repeating colonoscopy after 5 years due again in 2027    Patient has been advised of split billing requirements and indicates  "understanding: Yes        BMI  Estimated body mass index is 34.57 kg/m  as calculated from the following:    Height as of this encounter: 1.575 m (5' 2\").    Weight as of this encounter: 85.7 kg (189 lb).       Counseling  Appropriate preventive services were addressed with this patient via screening, questionnaire, or discussion as appropriate for fall prevention, nutrition, physical activity, Tobacco-use cessation, social engagement, weight loss and cognition.  Checklist reviewing preventive services available has been given to the patient.  Reviewed patient's diet, addressing concerns and/or questions.   She is at risk for psychosocial distress and has been provided with information to reduce risk.   Information on urinary incontinence and treatment options given to patient.       Follow-up in 1 year for annual wellness visit    Nhung hCau is a 69 year old, presenting for the following: Here for her annual wellness visit and to follow-up medical problems including hypertension, osteoarthritis, recent choledocholithiasis with cholecystectomy, hypothyroidism, and several other concerns including new rash on her arm.  See assessment and plan for details  Wellness Visit (Pt is fasting) and Rash (On upper arms for 1 month, little itchy)        11/6/2024    12:40 PM   Additional Questions   Roomed by Trish GERARDO           Rash  Associated symptoms include a rash.       Health Care Directive  Patient does have a Health Care Directive: Patient states has Advance Directive and will bring in a copy to clinic.      11/1/2024   General Health   How would you rate your overall physical health? Good   Feel stress (tense, anxious, or unable to sleep) Only a little      (!) STRESS CONCERN      11/1/2024   Nutrition   Diet: Regular (no restrictions)            11/1/2024   Exercise   Days per week of moderate/strenous exercise 0 days   Average minutes spent exercising at this level 0 min      (!) EXERCISE CONCERN      11/1/2024 "   Social Factors   Frequency of gathering with friends or relatives Twice a week   Worry food won't last until get money to buy more No    No   Food not last or not have enough money for food? No    No   Do you have housing? (Housing is defined as stable permanent housing and does not include staying ouside in a car, in a tent, in an abandoned building, in an overnight shelter, or couch-surfing.) Yes    Yes   Are you worried about losing your housing? No    No   Lack of transportation? No    No   Unable to get utilities (heat,electricity)? No    No       Multiple values from one day are sorted in reverse-chronological order         11/1/2024   Fall Risk   Fallen 2 or more times in the past year? Yes     Yes    Trouble with walking or balance? Yes     Yes        Patient-reported    Multiple values from one day are sorted in reverse-chronological order           11/1/2024   Activities of Daily Living- Home Safety   Needs help with the following daily activites None of the above   Safety concerns in the home None of the above            11/1/2024   Dental   Dentist two times every year? Yes            11/1/2024   Hearing Screening   Hearing concerns? None of the above            11/1/2024   Driving Risk Screening   Patient/family members have concerns about driving No            11/1/2024   General Alertness/Fatigue Screening   Have you been more tired than usual lately? No            11/1/2024   Urinary Incontinence Screening   Bothered by leaking urine in past 6 months Yes            11/1/2024   TB Screening   Were you born outside of the [place]? No            Today's PHQ-2 Score:       11/6/2024    12:22 PM   PHQ-2 ( 1999 [employer])   Q1: Little interest or pleasure in doing things 0    Q2: Feeling down, depressed or hopeless 0    PHQ-2 Score 0    Q1: Little interest or pleasure in doing things Not at all   Q2: Feeling down, depressed or hopeless Not at all   PHQ-2 Score 0       Patient-reported           11/1/2024    Substance Use   Alcohol more than 3/day or more than 7/wk No   Do you have a current opioid prescription? No   How severe/bad is pain from 1 to 10? 4/10   Do you use any other substances recreationally? No        Social History     Tobacco Use    Smoking status: Never     Passive exposure: Never    Smokeless tobacco: Never   Vaping Use    Vaping status: Never Used   Substance Use Topics    Alcohol use: Yes     Comment: Alcoholic Drinks/day: infrequent    Drug use: Never           5/9/2024   LAST FHS-7 RESULTS   1st degree relative breast or ovarian cancer Yes   Any relative bilateral breast cancer No   Any male have breast cancer No   Any ONE woman have BOTH breast AND ovarian cancer No   Any woman with breast cancer before 50yrs No   2 or more relatives with breast AND/OR ovarian cancer No   2 or more relatives with breast AND/OR bowel cancer No                 History of abnormal Pap smear: No - age 30-64 HPV with reflex Pap every 5 years recommended       ASCVD Risk   The 10-year ASCVD risk score (Neeraj PULIDO, et al., 2019) is: 8.8%    Values used to calculate the score:      Age: 69 years      Sex: Female      Is Non- : No      Diabetic: No      Tobacco smoker: No      Systolic Blood Pressure: 120 mmHg      Is BP treated: Yes      HDL Cholesterol: 48 mg/dL      Total Cholesterol: 217 mg/dL            Reviewed and updated as needed this visit by Provider                    Past Medical History:   Diagnosis Date    Acute torn meniscus of knee, left, initial encounter 02/08/2018    Acute torn meniscus of knee, left, sequela 05/14/2019    Balance problems 12/01/2023    Bilateral hearing loss 03/26/2021    Hearing aids    Biliary colic     Chest pain 03/14/2022    Choledocholithiasis 11/06/2024    Chronic foot pain, left 04/22/2024    Chronic left shoulder pain 05/03/2021    Chronic sinusitis     Class 2 severe obesity with serious comorbidity and body mass index (BMI) of 37.0 to 37.9  in adult (H) 07/21/2022    Depression     Diverticulitis 08/09/2016    CT scan normal    Epiploic appendagitis 01/25/2024    Gastroesophageal reflux disease     GERD with esophagitis     Complaints of dysphagia with EGD showing esophagitis.  Negative for eosinophilic esophagitis September 2019    History of colon polyps     Colonoscopy December 2018 with multiple polyps.  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  Added automatically from request for surgery [id number]    Influenza A 03/14/2022    LBBB (left bundle branch block) 11/06/2024    Normal echocardiogram and normal stress test    Left bundle branch block     Migraines     Mild depression 11/06/2024    Open-angle glaucoma of both eyes 07/21/2022    HONEY (obstructive sleep apnea)     Osteoarthritis     knees, hands    PONV (postoperative nausea and vomiting)     Postmenopausal symptoms     Primary osteoarthritis involving multiple joints 07/21/2022    Bilateral TKA and chronic pain involving left ankle and right shoulder    Primary osteoarthritis of both knees 03/26/2021    Bilateral TKA    Primary osteoarthritis of right knee 04/12/2016    R TKA    Rectal bleeding 08/09/2016    Rectocele 11/11/2016    Surgery is planned    Screening     DEXA normal 2016 and normal 2022    Situational anxiety     Status post total bilateral knee replacement 03/14/2022    Tear of right rotator cuff 06/22/2023    Shoulder surg 2023     Past Surgical History:   Procedure Laterality Date    ARTHRODESIS ANKLE Left 05/20/2024    Procedure: LEFT TALONAVICULAR FUSION;  Surgeon: Nila Tyler MD;  Location: [facility] OR    ARTHROPLASTY KNEE Right 04/01/2016    ARTHROPLASTY KNEE Left     jan 2020     ARTHROSCOPY KNEE Bilateral 2011 2014    BUNIONECTOMY      BUNIONECTOMY Left 05/20/2024    Procedure: AND LEFT BUNION REPAIR;  Surgeon: Nila Tyler MD;  Location:  OR    CHOLANGIOGRAM N/A 09/26/2024    Procedure: CHOLANGIOGRAMS;  Surgeon: Syd Smith MD;  Location: [facility] OR    COLPORRHAPHY N/A 02/21/2017    Procedure: POSTERIOR COLPORRHAPHY;  Surgeon: Roxana Frias MD;  Location: [facility];  Service:     FOOT SURGERY Left     2024    HYSTERECTOMY VAGINAL N/A 02/21/2017    Procedure: TOTAL VAGINAL HYSTERECTOMY, BILATERAL SALPINGO-OOPHORECTOMY;  Surgeon: Roxana Frias MD;  Location: [facility] OR;  Service:     LAPAROSCOPIC CHOLECYSTECTOMY N/A 09/26/2024    Procedure: CHOLECYSTECTOMY, LAPAROSCOPIC WITH;  Surgeon: Syd Smith MD;  Location: [facility] OR    MIDDLE EAR SURGERY      NEPHRECTOMY Left     Kidney donor    OOPHORECTOMY      RECESSION GASTROCNEMIUS Left 05/20/2024    Procedure: AND LEFT GASTROC SLIDE;  Surgeon: Nila Tyler MD;  Location:  OR    SHOULDER ARTHROSCOPY W/ ROTATOR CUFF REPAIR Right     TUBAL LIGATION       Current providers sharing in care for this patient include:  Patient Care Team:  Ho Quezada MD as PCP - General  Ho Quezada MD as Assigned PCP  Hemanth Painting DO as Physician (Cardiovascular Disease)  Syd Smith MD as MD (Surgery)  Roz Chen APRN CNP as Nurse Practitioner (Emergency Medicine)  Hemanth Painting DO as Assigned Heart and Vascular Provider  Syd Smith MD as Assigned Surgical Provider    The following health maintenance items are reviewed in Epic and correct as of today:  Health Maintenance   Topic Date Due    RSV VACCINE (1 - Risk 60-74 years 1-dose series) Never done    ANNUAL REVIEW OF HM ORDERS  07/07/2024    COVID-19 Vaccine (4 - 2024-25 season) 09/01/2024    MEDICARE ANNUAL WELLNESS VISIT  09/26/2024    LIPID  09/26/2024    ZOSTER IMMUNIZATION (2 of 2)  "12/05/2024    BMP  09/24/2025    TSH W/FREE T4 REFLEX  09/24/2025    FALL RISK ASSESSMENT  11/06/2025    MAMMO SCREENING  05/09/2026    GLUCOSE  09/24/2027    COLORECTAL CANCER SCREENING  02/25/2029    ADVANCE CARE PLANNING  11/06/2029    DTAP/TDAP/TD IMMUNIZATION (3 - Td or Tdap) 05/06/2034    DEXA  08/05/2037    HEPATITIS C SCREENING  Completed    PHQ-2 (once per calendar year)  Completed    INFLUENZA VACCINE  Completed    Pneumococcal Vaccine: 65+ Years  Completed    HPV IMMUNIZATION  Aged Out    MENINGITIS IMMUNIZATION  Aged Out    RSV MONOCLONAL ANTIBODY  Aged Out         Review of Systems  Constitutional, HEENT, cardiovascular, pulmonary, GI, , musculoskeletal, neuro, skin, endocrine and psych systems are negative, except as otherwise noted.     Objective    Exam  /70 (BP Location: Right arm, Patient Position: Sitting)   Pulse 81   Temp 97.9  F (36.6  C)   Resp 18   Ht 1.575 m (5' 2\")   Wt 85.7 kg (189 lb)   LMP  (LMP Unknown)   SpO2 96%   BMI 34.57 kg/m     Estimated body mass index is 34.57 kg/m  as calculated from the following:    Height as of this encounter: 1.575 m (5' 2\").    Weight as of this encounter: 85.7 kg (189 lb).    Physical Exam  EYES: Eyelids, conjunctiva, and sclera were normal. Pupils were normal. Cornea, iris, and lens were normal bilaterally.  HEAD, EARS, NOSE, MOUTH, AND THROAT: Head and face were normal. TMs and external auditory canals are normal.  Oropharynx normal  NECK: Neck appearance was normal. There were no neck masses and the thyroid was not enlarged and no nodules are felt.  No lymphadenopathy.  RESPIRATORY: Breathing pattern was normal and the chest moved symmetrically.   Lung sounds were normal and there were no rales or wheezes.  CARDIOVASCULAR: Heart rate and rhythm were normal.  S1 and S2 were normal and there were no extra sounds or murmurs. Peripheral pulses in arms and legs were normal.  There was no peripheral edema.  No carotid " bruits.  GASTROINTESTINAL:  Bowel sounds were present.   Palpation detected no tenderness, mass, or enlarged organs.   MUSCULOSKELETAL: Skeletal configuration was normal and muscle mass was normal for age. Joint appearance was overall normal.  LYMPHATIC: There were no enlarged nodes.  SKIN/HAIR/NAILS: Skin color was normal.  There were no concerning skin lesions.  Well-demarcated erythematous round lesions on left arm with rough texture  NEUROLOGIC: The patient was alert and oriented to person, place, time, and circumstance. Speech was normal. Cranial nerves were normal. Motor strength was normal for age. The patient was normally coordinated.  Sensation intact.  PSYCHIATRIC:  Mood and affect were normal and the patient had normal recent and remote memory. The patient's judgment and insight were normal.  PHQ 2 score is 0        11/6/2024   Mini Cog   Clock Draw Score 2 Normal   3 Item Recall 3 objects recalled   Mini Cog Total Score 5                 Signed Electronically by: Ho Quezada MD

## 2024-11-06 NOTE — PATIENT INSTRUCTIONS
Patient Education   Preventive Care Advice   This is general advice given by our system to help you stay healthy. However, your care team may have specific advice just for you. Please talk to your care team about your preventive care needs.  Nutrition  Eat 5 or more servings of fruits and vegetables each day.  Try wheat bread, brown rice and whole grain pasta (instead of white bread, rice, and pasta).  Get enough calcium and vitamin D. Check the label on foods and aim for 100% of the RDA (recommended daily allowance).  Lifestyle  Exercise at least 150 minutes each week  (30 minutes a day, 5 days a week).  Do muscle strengthening activities 2 days a week. These help control your weight and prevent disease.  No smoking.  Wear sunscreen to prevent skin cancer.  I would recommend seeing a dermatologist every 1 to 2 years for a total-body skin exam  Have a dental exam and cleaning every 6 months.  Eye exam every 6 months  Yearly exams  See your health care team every year to talk about:  Any changes in your health.  Any medicines your care team has prescribed.  Preventive care, family planning, and ways to prevent chronic diseases.  Shots (vaccines)   COVID-19 shot: Recommended this fall  Flu shot: Get a flu shot every year.  Tetanus shot: Get a tetanus shot every 10 years.  RSV vaccine is recommended this fall.  You can schedule this at your pharmacy  Shingles shot (for age 50 and up).  Make sure you get your second Shingrix vaccine completed after December 10  General health tests  Diabetes screening: Annually  Cholesterol test: Annually  Bone density scan (DEXA): Consider repeating at age 70  Hepatitis C: Get tested at least once in your life.  Cancer screening tests  Breast cancer scan (mammogram): Annual mammogram is recommended  Colon cancer screening: It is important to start screening for colon cancer at age 45.  Colonoscopy will be due after March 2027  For informational purposes only. Not to replace the advice  of your health care provider. Copyright   2023 [facility]. All rights reserved. Clinically reviewed by the [facility] Transitions Program. "[employer]" 349266 - REV 01/24.  Preventing Falls: Care Instructions  Injuries and health problems such as trouble walking or poor eyesight can increase your risk of falling. So can some medicines. But there are things you can do to help prevent falls. You can exercise to get stronger. You can also arrange your home to make it safer.    Talk to your doctor about the medicines you take. Ask if any of them increase the risk of falls and whether they can be changed or stopped.   Try to exercise regularly. It can help improve your strength and balance. This can help lower your risk of falling.         Practice fall safety and prevention.   Wear low-heeled shoes that fit well and give your feet good support. Talk to your doctor if you have foot problems that make this hard.  Carry a cellphone or wear a medical alert device that you can use to call for help.  Use stepladders instead of chairs to reach high objects. Don't climb if you're at risk for falls. Ask for help, if needed.  Wear the correct eyeglasses, if you need them.        Make your home safer.   Remove rugs, cords, clutter, and furniture from walkways.  Keep your house well lit. Use night-lights in hallways and bathrooms.  Install and use sturdy handrails on stairways.  Wear nonskid footwear, even inside. Don't walk barefoot or in socks without shoes.        Be safe outside.   Use handrails, curb cuts, and ramps whenever possible.  Keep your hands free by using a shoulder bag or backpack.  Try to walk in well-lit areas. Watch out for uneven ground, changes in pavement, and debris.  Be careful in the winter. Walk on the grass or gravel when sidewalks are slippery. Use de-icer on steps and walkways. Add non-slip devices to shoes.    Put grab bars and nonskid mats in your shower or tub and near the toilet.  "Try to use a shower chair or bath bench when bathing.   Get into a tub or shower by putting in your weaker leg first. Get out with your strong side first. Have a phone or medical alert device in the bathroom with you.   Where can you learn more?  Go to https://www.BufferBox.net/patiented  Enter G117 in the search box to learn more about \"Preventing Falls: Care Instructions.\"  Current as of: July 17, 2023  Content Version: 14.2 2024 [place].   Care instructions adapted under license by your healthcare professional. If you have questions about a medical condition or this instruction, always ask your healthcare professional. Healthwise, [employer] disclaims any warranty or liability for your use of this information.    Bladder Training: Care Instructions  Your Care Instructions     Bladder training is used to treat urge incontinence and stress incontinence. Urge incontinence means that the need to urinate comes on so fast that you can't get to a toilet in time. Stress incontinence means that you leak urine because of pressure on your bladder. For example, it may happen when you laugh, cough, or lift something heavy.  Bladder training can increase how long you can wait before you have to urinate. It can also help your bladder hold more urine. And it can give you better control over the urge to urinate.  It is important to remember that bladder training takes a few weeks to a few months to make a difference. You may not see results right away, but don't give up.  Follow-up care is a key part of your treatment and safety. Be sure to make and go to all appointments, and call your doctor if you are having problems. It's also a good idea to know your test results and keep a list of the medicines you take.  How can you care for yourself at home?  Work with your doctor to come up with a bladder training program that is right for you. You may use one or more of the following methods.  Delayed urination  In " "the beginning, try to keep from urinating for 5 minutes after you first feel the need to go.  While you wait, take deep, slow breaths to relax. Kegel exercises can also help you delay the need to go to the bathroom.  After some practice, when you can easily wait 5 minutes to urinate, try to wait 10 minutes before you urinate.  Slowly increase the waiting period until you are able to control when you have to urinate.  Scheduled urination  Empty your bladder when you first wake up in the morning.  Schedule times throughout the day when you will urinate.  Start by going to the bathroom every hour, even if you don't need to go.  Slowly increase the time between trips to the bathroom.  When you have found a schedule that works well for you, keep doing it.  If you wake up during the night and have to urinate, do it. Apply your schedule to waking hours only.  Kegel exercises  These tighten and strengthen pelvic muscles, which can help you control the flow of urine. (If doing these exercises causes pain, stop doing them and talk with your doctor.) To do Kegel exercises:  Squeeze your muscles as if you were trying not to pass gas. Or squeeze your muscles as if you were stopping the flow of urine. Your belly, legs, and buttocks shouldn't move.  Hold the squeeze for 3 seconds, then relax for 5 to 10 seconds.  Start with 3 seconds, then add 1 second each week until you are able to squeeze for 10 seconds.  Repeat the exercise 10 times a session. Do 3 to 8 sessions a day.  When should you call for help?  Watch closely for changes in your health, and be sure to contact your doctor if:    Your incontinence is getting worse.     You do not get better as expected.   Where can you learn more?  Go to https://www.[employer]og[employer].net/patiented  Enter V684 in the search box to learn more about \"Bladder Training: Care Instructions.\"  Current as of: November 15, 2023  Content Version: 14.2 2024 [employer].   Care instructions " "adapted under license by your healthcare professional. If you have questions about a medical condition or this instruction, always ask your healthcare professional. Healthwise, [facility] disclaims any warranty or liability for your use of this information.    Eating Healthy Foods: Care Instructions  With every meal, you can make healthy food choices. Try to eat a variety of fruits, vegetables, whole grains, lean proteins, and low-fat dairy products. This can help you get the right balance of nutrients, including vitamins and minerals. Small changes add up over time. You can start by adding one healthy food to your meals each day.    Try to make half your plate fruits and vegetables, one-fourth whole grains, and one-fourth lean proteins. Try including dairy with your meals.   Eat more fruits and vegetables. Try to have them with most meals and snacks.   Foods for healthy eating        Fruits   These can be fresh, frozen, canned, or dried.  Try to choose whole fruit rather than fruit juice.  Eat a variety of colors.        Vegetables   These can be fresh, frozen, canned, or dried.  Beans, peas, and lentils count too.        Whole grains   Choose whole-grain breads, cereals, and noodles.  Try brown rice.        Lean proteins   These can include lean meat, poultry, fish, and eggs.  You can also have tofu, beans, peas, lentils, nuts, and seeds.        Dairy   Try milk, yogurt, and cheese.  Choose low-fat or fat-free when you can.  If you need to, use lactose-free milk or fortified plant-based milk products, such as soy milk.        Water   Drink water when you're thirsty.  Limit sugar-sweetened drinks, including soda, fruit drinks, and sports drinks.  Where can you learn more?  Go to https://www.[employer]ol [employer].net/patiented  Enter T756 in the search box to learn more about \"Eating Healthy Foods: Care Instructions.\"  Current as of: September 20, 2023  Content Version: 14.2 2024 [facility] [employer] [facility].   Care " "instructions adapted under license by your healthcare professional. If you have questions about a medical condition or this instruction, always ask your healthcare professional. Healthwise, [employer] disclaims any warranty or liability for your use of this information.    Learning About Being Physically Active  What is physical activity?     Being physically active means doing any kind of activity that gets your body moving.  The types of physical activity that can help you get fit and stay healthy include:  Aerobic or \"cardio\" activities. These make your heart beat faster and make you breathe harder, such as brisk walking, riding a bike, or running. They strengthen your heart and lungs and build up your endurance.  Strength training activities. These make your muscles work against, or \"resist,\" something. Examples include lifting weights or doing push-ups. These activities help tone and strengthen your muscles and bones.  Stretches. These let you move your joints and muscles through their full range of motion. Stretching helps you be more flexible.  Reaching a balance between these three types of physical activity is important because each one contributes to your overall fitness.  What are the benefits of being active?  Being active is one of the best things you can do for your health. It helps you to:  Feel stronger and have more energy to do all the things you like to do.  Focus better at school or work.  Feel, think, and sleep better.  Reach and stay at a healthy weight.  Lose fat and build lean muscle.  Lower your risk for serious health problems, including diabetes, heart attack, high blood pressure, and some cancers.  Keep your heart, lungs, bones, muscles, and joints strong and healthy.  How can you make being active part of your life?  Start slowly. Make it your long-term goal to get at least 30 minutes of exercise on most days of the week. Walking is a good choice. You also may want to do other " "activities, such as running, swimming, cycling, or playing tennis or team sports.  Pick activities that you like--ones that make your heart beat faster, your muscles stronger, and your muscles and joints more flexible. If you find more than one thing you like doing, do them all. You don't have to do the same thing every day.  Get your heart pumping every day. Any activity that makes your heart beat faster and keeps it at that rate for a while counts.  Here are some great ways to get your heart beating faster:  Go for a brisk walk, run, or hike.  Go for a swim or bike ride.  Take an online exercise class or dance.  Play a game of touch football, basketball, or soccer.  Play tennis, pickleball, or racquetball.  Climb stairs.  Even some household chores can be aerobic. Just do them at a faster pace. Raking or mowing the lawn, sweeping the garage, and vacuuming and cleaning your home all can help get your heart rate up.  Strengthen your muscles during the week. You don't have to lift heavy weights or grow big, bulky muscles to get stronger. Doing a few simple activities that make your muscles work against, or \"resist,\" something can help you get stronger. Aim for at least twice a week.  For example, you can:  Do push-ups or sit-ups, which use your own body weight as resistance.  Lift weights or dumbbells or use stretch bands at home or in a gym or community center.  Stretch your muscles often. Stretching will help you as you become more active. It can help you stay flexible and loosen tight muscles. It can also help improve your balance and posture and can be a great way to relax.  Be sure to stretch the muscles you'll be using when you work out. It's best to warm your muscles slightly before you stretch them. Walk or do some other light aerobic activity for a few minutes. Then start stretching.  When you stretch your muscles:  Do it slowly. Stretching is not about going fast or making sudden movements.  Don't push or " "bounce during a stretch.  Hold each stretch for at least 15 to 30 seconds, if you can. You should feel a stretch in the muscle, but not pain.  Breathe out as you do the stretch. Then breathe in as you hold the stretch. Don't hold your breath.  If you're worried about how more activity might affect your health, have a checkup before you start. Follow any special advice your doctor gives you for getting a smart start.  Where can you learn more?  Go to https://www.[employer].net/patiented  Enter W332 in the search box to learn more about \"Learning About Being Physically Active.\"  Current as of: June 5, 2023  Content Version: 14.2 2024 [facility] [employer].   Care instructions adapted under license by your healthcare professional. If you have questions about a medical condition or this instruction, always ask your healthcare professional. Healthwise, Incorporated disclaims any warranty or liability for your use of this information.       "

## 2024-11-07 DIAGNOSIS — E03.9 ACQUIRED HYPOTHYROIDISM: ICD-10-CM

## 2024-11-07 DIAGNOSIS — R73.01 IMPAIRED FASTING GLUCOSE: Primary | ICD-10-CM

## 2024-11-07 LAB
EST. AVERAGE GLUCOSE BLD GHB EST-MCNC: 114 MG/DL
HBA1C MFR BLD: 5.6 % (ref 0–5.6)
T4 FREE SERPL-MCNC: 1.83 NG/DL (ref 0.9–1.7)

## 2024-11-07 RX ORDER — LEVOTHYROXINE SODIUM 112 UG/1
112 TABLET ORAL DAILY
Qty: 90 TABLET | Refills: 3 | Status: SHIPPED | OUTPATIENT
Start: 2024-11-07

## 2024-12-10 ENCOUNTER — ANCILLARY PROCEDURE (OUTPATIENT)
Dept: GENERAL RADIOLOGY | Facility: CLINIC | Age: 69
End: 2024-12-10
Attending: INTERNAL MEDICINE
Payer: COMMERCIAL

## 2024-12-10 ENCOUNTER — OFFICE VISIT (OUTPATIENT)
Dept: INTERNAL MEDICINE | Facility: CLINIC | Age: 69
End: 2024-12-10
Payer: COMMERCIAL

## 2024-12-10 VITALS
SYSTOLIC BLOOD PRESSURE: 112 MMHG | HEIGHT: 62 IN | RESPIRATION RATE: 14 BRPM | BODY MASS INDEX: 35.7 KG/M2 | DIASTOLIC BLOOD PRESSURE: 62 MMHG | OXYGEN SATURATION: 98 % | HEART RATE: 106 BPM | WEIGHT: 194 LBS | TEMPERATURE: 98.2 F

## 2024-12-10 DIAGNOSIS — M54.6 ACUTE RIGHT-SIDED THORACIC BACK PAIN: Primary | ICD-10-CM

## 2024-12-10 DIAGNOSIS — M54.6 ACUTE RIGHT-SIDED THORACIC BACK PAIN: ICD-10-CM

## 2024-12-10 DIAGNOSIS — R11.0 NAUSEA: ICD-10-CM

## 2024-12-10 DIAGNOSIS — R21 RASH: ICD-10-CM

## 2024-12-10 DIAGNOSIS — K21.00 GASTROESOPHAGEAL REFLUX DISEASE WITH ESOPHAGITIS WITHOUT HEMORRHAGE: ICD-10-CM

## 2024-12-10 DIAGNOSIS — R10.11 RUQ ABDOMINAL PAIN: ICD-10-CM

## 2024-12-10 LAB
ERYTHROCYTE [DISTWIDTH] IN BLOOD BY AUTOMATED COUNT: 12.4 % (ref 10–15)
HCT VFR BLD AUTO: 40 % (ref 35–47)
HGB BLD-MCNC: 13.5 G/DL (ref 11.7–15.7)
MCH RBC QN AUTO: 29.7 PG (ref 26.5–33)
MCHC RBC AUTO-ENTMCNC: 33.8 G/DL (ref 31.5–36.5)
MCV RBC AUTO: 88 FL (ref 78–100)
PLATELET # BLD AUTO: 271 10E3/UL (ref 150–450)
RBC # BLD AUTO: 4.54 10E6/UL (ref 3.8–5.2)
WBC # BLD AUTO: 6.4 10E3/UL (ref 4–11)

## 2024-12-10 PROCEDURE — 99214 OFFICE O/P EST MOD 30 MIN: CPT | Mod: 24 | Performed by: INTERNAL MEDICINE

## 2024-12-10 PROCEDURE — 83690 ASSAY OF LIPASE: CPT | Performed by: INTERNAL MEDICINE

## 2024-12-10 PROCEDURE — 36415 COLL VENOUS BLD VENIPUNCTURE: CPT | Performed by: INTERNAL MEDICINE

## 2024-12-10 PROCEDURE — 80053 COMPREHEN METABOLIC PANEL: CPT | Performed by: INTERNAL MEDICINE

## 2024-12-10 PROCEDURE — G2211 COMPLEX E/M VISIT ADD ON: HCPCS | Performed by: INTERNAL MEDICINE

## 2024-12-10 PROCEDURE — 72070 X-RAY EXAM THORAC SPINE 2VWS: CPT | Mod: TC | Performed by: PREVENTIVE MEDICINE

## 2024-12-10 PROCEDURE — 85027 COMPLETE CBC AUTOMATED: CPT | Performed by: INTERNAL MEDICINE

## 2024-12-10 NOTE — PATIENT INSTRUCTIONS
You should contact your dermatologist and get an appointment ASAP.  I am concerned that your rash may represent psoriasis    Increase omeprazole to twice daily and avoid NSAIDs including ibuprofen.  Use acetaminophen for pain.

## 2024-12-10 NOTE — PROGRESS NOTES
Assessment & Plan     Acute right-sided thoracic back pain  69-year-old woman with persistent pain in her mid back.  Worse with movement suggesting musculoskeletal etiology.  However, intermittent sharp pain right upper quadrant as well.  This pain is usually brief and unclear if related to her pain in her back.  Exam is not very enlightening.  There is some vague tenderness with palpation over her musculature and ribs lateral to her spine.  No rashes seen.  No tenderness right upper quadrant.  She did have a fall several months ago.  Will obtain x-ray to rule out vertebral fracture.  Consider imaging with CT abdomen/pelvis to make sure this is not a referred pain from her kidney.  Ultimately I think she may benefit from physical therapy if the above workup proves normal.  - XR Thoracic Spine 2 Views; Future  - Physical Therapy  Referral; Future    RUQ abdominal pain  As above, recurrent episodes of sharp brief pain in right upper quadrant/right lower chest.  Unclear if related to the pain in her back.  She did have cholecystectomy in September.  This pain feels very different.  It is not related to meals.  Pain is worse with deep breath and with changing position.  Exam is unremarkable.  I am checking labs including LFTs and lipase along with CBC.  As above, CT abdomen/pelvis may be needed.  - CBC with platelets; Future  - Comprehensive metabolic panel (BMP + Alb, Alk Phos, ALT, AST, Total. Bili, TP); Future  - Lipase; Future    Nausea  Unclear etiology of nausea.  Worse in the morning before she eats.  Suspect gastritis.  She will take up to 4 ibuprofen throughout the week and I am recommending that she avoid all NSAIDs as this could be responsible for gastritis.  She can use acetaminophen instead.  She should also increase her omeprazole to 20 mg twice daily.  - CBC with platelets; Future  - Comprehensive metabolic panel (BMP + Alb, Alk Phos, ALT, AST, Total. Bili, TP); Future  - Lipase;  "Future    Rash  Worsening rash involving her torso including upper back and arms.  Well-demarcated erythematous lesions with scaling suggesting possible psoriasis.  I am asking her to make an appointment with a dermatologist to have this evaluated    Gastroesophageal reflux disease with esophagitis without hemorrhage    - omeprazole (PRILOSEC) 20 MG DR capsule; Take 1 capsule (20 mg) by mouth 2 times daily.      The longitudinal plan of care for the diagnosis(es)/condition(s) as documented were addressed during this visit. Due to the added complexity in care, I will continue to support Kandi in the subsequent management and with ongoing continuity of care.     BMI  Estimated body mass index is 35.48 kg/m  as calculated from the following:    Height as of this encounter: 1.575 m (5' 2\").    Weight as of this encounter: 88 kg (194 lb).         Follow-up for annual wellness visit November 2025    Subjective   Kandi is a 69 year old, presenting for the following health issues: See assessment and plan for details  Follow Up (Post op cholecystectomy 9/26/24, has had a sharp pain in right side below ribs that radiates to her back. Has some episodes of nausea still/Still has rash from last visit, it is spreading)      12/10/2024     2:54 PM   Additional Questions   Roomed by      History of Present Illness       Reason for visit:  Pain near right breast bone and back  rash worstening  Symptom onset:  1-2 weeks ago  Symptoms include:  Sharp pain  Symptom intensity:  Moderate  Symptom progression:  Staying the same  Had these symptoms before:  No  What makes it worse:  No  What makes it better:  No   She is taking medications regularly.       Review of Systems  No exertional chest pain.  No shortness of breath no emesis.  Some occasional loose stools.  No blood in her stools.  No fevers or chills.        Objective    /62 (BP Location: Right arm, Patient Position: Sitting, Cuff Size: Adult Regular)   Pulse 106   Temp " "98.2  F (36.8  C) (Oral)   Resp 14   Ht 1.575 m (5' 2\")   Wt 88 kg (194 lb)   LMP  (LMP Unknown)   SpO2 98%   BMI 35.48 kg/m    Body mass index is 35.48 kg/m .  Physical Exam     Overweight but otherwise well-appearing middle-age woman  Multiple well demarcated erythematous lesions on torso and upper extremities with scaling suspicious for psoriasis  No reproducible tenderness over thoracic spine but some discomfort when I push over the musculature and ribs lateral to her spine on the right.  No tenderness right upper quadrant.  No rashes seen in this area            Signed Electronically by: Ho Quezada MD    "

## 2024-12-11 LAB
ALBUMIN SERPL BCG-MCNC: 4.3 G/DL (ref 3.5–5.2)
ALP SERPL-CCNC: 122 U/L (ref 40–150)
ALT SERPL W P-5'-P-CCNC: 18 U/L (ref 0–50)
ANION GAP SERPL CALCULATED.3IONS-SCNC: 11 MMOL/L (ref 7–15)
AST SERPL W P-5'-P-CCNC: 23 U/L (ref 0–45)
BILIRUB SERPL-MCNC: 0.5 MG/DL
BUN SERPL-MCNC: 17.8 MG/DL (ref 8–23)
CALCIUM SERPL-MCNC: 10 MG/DL (ref 8.8–10.4)
CHLORIDE SERPL-SCNC: 108 MMOL/L (ref 98–107)
CREAT SERPL-MCNC: 1.06 MG/DL (ref 0.51–0.95)
EGFRCR SERPLBLD CKD-EPI 2021: 57 ML/MIN/1.73M2
GLUCOSE SERPL-MCNC: 101 MG/DL (ref 70–99)
HCO3 SERPL-SCNC: 23 MMOL/L (ref 22–29)
LIPASE SERPL-CCNC: 46 U/L (ref 13–60)
POTASSIUM SERPL-SCNC: 4.3 MMOL/L (ref 3.4–5.3)
PROT SERPL-MCNC: 7 G/DL (ref 6.4–8.3)
SODIUM SERPL-SCNC: 142 MMOL/L (ref 135–145)

## 2024-12-12 DIAGNOSIS — R10.11 RUQ ABDOMINAL PAIN: Primary | ICD-10-CM

## 2025-01-23 ENCOUNTER — TRANSFERRED RECORDS (OUTPATIENT)
Dept: HEALTH INFORMATION MANAGEMENT | Facility: CLINIC | Age: 70
End: 2025-01-23
Payer: COMMERCIAL

## 2025-01-30 DIAGNOSIS — F32.A DEPRESSION, UNSPECIFIED DEPRESSION TYPE: ICD-10-CM

## 2025-01-30 RX ORDER — CITALOPRAM HYDROBROMIDE 20 MG/1
TABLET ORAL
Qty: 90 TABLET | Refills: 2 | OUTPATIENT
Start: 2025-01-30

## 2025-02-10 ENCOUNTER — MYC MEDICAL ADVICE (OUTPATIENT)
Dept: INTERNAL MEDICINE | Facility: CLINIC | Age: 70
End: 2025-02-10
Payer: COMMERCIAL

## 2025-02-10 DIAGNOSIS — K21.00 GASTROESOPHAGEAL REFLUX DISEASE WITH ESOPHAGITIS WITHOUT HEMORRHAGE: ICD-10-CM

## 2025-02-10 RX ORDER — OMEPRAZOLE 20 MG/1
20 CAPSULE, DELAYED RELEASE ORAL 2 TIMES DAILY
Qty: 180 CAPSULE | Refills: 3 | Status: SHIPPED | OUTPATIENT
Start: 2025-02-10

## 2025-03-10 ENCOUNTER — VIRTUAL VISIT (OUTPATIENT)
Dept: FAMILY MEDICINE | Facility: CLINIC | Age: 70
End: 2025-03-10
Payer: COMMERCIAL

## 2025-03-10 DIAGNOSIS — J01.00 ACUTE NON-RECURRENT MAXILLARY SINUSITIS: Primary | ICD-10-CM

## 2025-03-10 DIAGNOSIS — J40 BRONCHITIS: ICD-10-CM

## 2025-03-10 PROCEDURE — 98006 SYNCH AUDIO-VIDEO EST MOD 30: CPT | Performed by: INTERNAL MEDICINE

## 2025-03-10 RX ORDER — BENZONATATE 200 MG/1
200 CAPSULE ORAL 3 TIMES DAILY PRN
Qty: 30 CAPSULE | Refills: 0 | Status: SHIPPED | OUTPATIENT
Start: 2025-03-10

## 2025-03-10 NOTE — PROGRESS NOTES
"  If patient has telephone visit, have they been educated on video visit as preferred visit method and offered to change to video visit? NOT APPLICABLE        Instructions Relayed to Patient by Virtual Roomer:     Patient is active on [employer]:   Relayed following to patient: \"It looks like you are active on [employer], are you able to join the visit this way? If not, do you need us to send you a link now or would you like your provider to send a link via text or email when they are ready to initiate the visit?\"      Patient Confirmed they will join visit via: [employer]  Reminded patient to ensure they were logged on to virtual visit by arrival time listed.   Asked if patient has flexibility to initiate visit sooner than arrival time: patient stated yes, documented in appointment notes availability to initiate visit earlier than arrival time     If pediatric virtual visit, ensured pediatric patient along with parent/guardian will be present for video visit.     Patient offered the website [email or web address] and/or phone number to [facility] Help line: [phone]      Kandi is a 69 year old who is being evaluated via a billable video visit.    How would you like to obtain your AVS? AVOS [employer]  If the video visit is dropped, the invitation should be resent by: Text to cell phone: [phone]  Will anyone else be joining your video visit? No      Assessment & Plan     Acute non-recurrent maxillary sinusitis  Has been having some ongoing sinusitis symptoms for the last 10 days  Initially had some sore throat and fever for 1 day  Later started having sinus drainage   Feels congested  Tried multiple OTC remedies without much benefit  Having thick nasal discharge  Also has cheekbone pain and headache  Since symptoms have been ongoing for more than 10 days I think it is reasonable to try a course of antibiotics  Discussed about Vivienne martiniam inhalation  - amoxicillin-clavulanate (AUGMENTIN) 875-125 MG tablet; " "Take 1 tablet by mouth 2 times daily.    Bronchitis  She is having cough mainly because of postnasal drainage  Discussed about OTC remedies for cough including Tessalon Perles  She has no wheezing  - benzonatate (TESSALON) 200 MG capsule; Take 1 capsule (200 mg) by mouth 3 times daily as needed for cough.        MED REC REQUIRED  Post Medication Reconciliation Status: discharge medications reconciled, continue medications without change  BMI  Estimated body mass index is 35.48 kg/m  as calculated from the following:    Height as of 12/10/24: 1.575 m (5' 2\").    Weight as of 12/10/24: 88 kg (194 lb).             Nhung Chau is a 69 year old, presenting for the following health issues:  No chief complaint on file.        3/10/2025     1:32 PM   Additional Questions   Roomed by Feliberto   Accompanied by self       Video Start Time:  200PM    [facility]      ED/UC Followup:    Facility:  [facility]  Date of visit: 3/7/2025  Reason for visit: URI  Current Status: Coughing up green mucus, patients states symptoms has worsened        Review of Systems  Constitutional, HEENT, cardiovascular, pulmonary, gi and gu systems are negative, except as otherwise noted.      Objective           Vitals:  No vitals were obtained today due to virtual visit.    Physical Exam   GENERAL: alert and no distress  EYES: Eyes grossly normal to inspection.  No discharge or erythema, or obvious scleral/conjunctival abnormalities.  RESP: No audible wheeze, cough, or visible cyanosis.    SKIN: Visible skin clear. No significant rash, abnormal pigmentation or lesions.  NEURO: Cranial nerves grossly intact.  Mentation and speech appropriate for age.  PSYCH: Appropriate affect, tone, and pace of words          Video-Visit Details    Type of service:  Video Visit   Video End Time: 215PM  Originating Location (pt. Location): Home    Distant Location (provider location):  Off-site  Platform used for Video Visit: Mera  Signed Electronically by: Sam" MD Vy

## 2025-03-15 DIAGNOSIS — Z96.653 STATUS POST TOTAL BILATERAL KNEE REPLACEMENT: ICD-10-CM

## 2025-03-17 RX ORDER — AMOXICILLIN 500 MG/1
2000 CAPSULE ORAL ONCE
Qty: 4 CAPSULE | Refills: 3 | Status: SHIPPED | OUTPATIENT
Start: 2025-03-17 | End: 2025-03-17

## 2025-03-19 ENCOUNTER — TELEPHONE (OUTPATIENT)
Dept: PHARMACY | Facility: OTHER | Age: 70
End: 2025-03-19
Payer: COMMERCIAL

## 2025-03-19 NOTE — TELEPHONE ENCOUNTER
MT Recruitment: On license of [facility]     Referral outreach attempt #1 on March 19, 2025      Outcome: left voicemail- Call back number [phone]    Conor Lopez  [facility]

## 2025-03-27 DIAGNOSIS — F32.A DEPRESSION, UNSPECIFIED DEPRESSION TYPE: ICD-10-CM

## 2025-03-27 RX ORDER — CITALOPRAM HYDROBROMIDE 20 MG/1
20 TABLET ORAL
Qty: 90 TABLET | Refills: 3 | Status: SHIPPED | OUTPATIENT
Start: 2025-03-27

## 2025-04-02 ENCOUNTER — TELEPHONE (OUTPATIENT)
Dept: PHARMACY | Facility: OTHER | Age: 70
End: 2025-04-02
Payer: COMMERCIAL

## 2025-04-02 NOTE — TELEPHONE ENCOUNTER
MT Recruitment: [facility]     Referral outreach attempt #2 on April 2, 2025      Outcome: left voicemail- Call back number [phone]    Conor Lopez  [facility]

## 2025-04-05 ENCOUNTER — LAB (OUTPATIENT)
Dept: LAB | Facility: CLINIC | Age: 70
End: 2025-04-05
Payer: COMMERCIAL

## 2025-04-05 DIAGNOSIS — E03.9 ACQUIRED HYPOTHYROIDISM: ICD-10-CM

## 2025-04-05 LAB — TSH SERPL DL<=0.005 MIU/L-ACNC: 0.49 UIU/ML (ref 0.3–4.2)

## 2025-04-05 PROCEDURE — 84443 ASSAY THYROID STIM HORMONE: CPT

## 2025-04-05 PROCEDURE — 36415 COLL VENOUS BLD VENIPUNCTURE: CPT

## 2025-04-18 ENCOUNTER — ANCILLARY PROCEDURE (OUTPATIENT)
Dept: GENERAL RADIOLOGY | Facility: CLINIC | Age: 70
End: 2025-04-18
Attending: INTERNAL MEDICINE
Payer: COMMERCIAL

## 2025-04-18 DIAGNOSIS — R05.1 ACUTE COUGH: ICD-10-CM

## 2025-04-18 PROCEDURE — 71046 X-RAY EXAM CHEST 2 VIEWS: CPT | Mod: TC | Performed by: RADIOLOGY

## 2025-04-24 ENCOUNTER — OFFICE VISIT (OUTPATIENT)
Dept: URGENT CARE | Facility: URGENT CARE | Age: 70
End: 2025-04-24
Payer: COMMERCIAL

## 2025-04-24 ENCOUNTER — E-VISIT (OUTPATIENT)
Dept: URGENT CARE | Facility: CLINIC | Age: 70
End: 2025-04-24
Payer: COMMERCIAL

## 2025-04-24 VITALS
OXYGEN SATURATION: 95 % | BODY MASS INDEX: 36.49 KG/M2 | SYSTOLIC BLOOD PRESSURE: 114 MMHG | DIASTOLIC BLOOD PRESSURE: 77 MMHG | RESPIRATION RATE: 20 BRPM | HEIGHT: 62 IN | WEIGHT: 198.3 LBS | HEART RATE: 106 BPM | TEMPERATURE: 98.3 F

## 2025-04-24 DIAGNOSIS — R19.7 DIARRHEA, UNSPECIFIED TYPE: ICD-10-CM

## 2025-04-24 DIAGNOSIS — E87.1 HYPONATREMIA: ICD-10-CM

## 2025-04-24 DIAGNOSIS — R19.7 DIARRHEA, UNSPECIFIED TYPE: Primary | ICD-10-CM

## 2025-04-24 DIAGNOSIS — N18.30 STAGE 3 CHRONIC KIDNEY DISEASE, UNSPECIFIED WHETHER STAGE 3A OR 3B CKD (H): ICD-10-CM

## 2025-04-24 DIAGNOSIS — T36.95XA: Primary | ICD-10-CM

## 2025-04-24 LAB
ALBUMIN SERPL-MCNC: 3.6 G/DL (ref 3.4–5)
ALP SERPL-CCNC: 100 U/L (ref 40–150)
ALT SERPL W P-5'-P-CCNC: 14 U/L (ref 0–50)
ANION GAP SERPL CALCULATED.3IONS-SCNC: 1 MMOL/L (ref 3–14)
AST SERPL W P-5'-P-CCNC: 21 U/L (ref 0–45)
BASOPHILS # BLD AUTO: 0 10E3/UL (ref 0–0.2)
BASOPHILS NFR BLD AUTO: 0 %
BILIRUB SERPL-MCNC: 0.6 MG/DL (ref 0.2–1.3)
BUN SERPL-MCNC: 14 MG/DL (ref 7–30)
CALCIUM SERPL-MCNC: 9.3 MG/DL (ref 8.5–10.1)
CHLORIDE BLD-SCNC: 110 MMOL/L (ref 94–109)
CO2 SERPL-SCNC: 21 MMOL/L (ref 20–32)
CREAT SERPL-MCNC: 1 MG/DL (ref 0.52–1.04)
EGFRCR SERPLBLD CKD-EPI 2021: 61 ML/MIN/1.73M2
EOSINOPHIL # BLD AUTO: 0.1 10E3/UL (ref 0–0.7)
EOSINOPHIL NFR BLD AUTO: 1 %
ERYTHROCYTE [DISTWIDTH] IN BLOOD BY AUTOMATED COUNT: 12.5 % (ref 10–15)
GLUCOSE BLD-MCNC: 138 MG/DL (ref 70–99)
HCT VFR BLD AUTO: 40.5 % (ref 35–47)
HGB BLD-MCNC: 13.5 G/DL (ref 11.7–15.7)
IMM GRANULOCYTES # BLD: 0 10E3/UL
IMM GRANULOCYTES NFR BLD: 0 %
LYMPHOCYTES # BLD AUTO: 0.7 10E3/UL (ref 0.8–5.3)
LYMPHOCYTES NFR BLD AUTO: 6 %
MCH RBC QN AUTO: 29.3 PG (ref 26.5–33)
MCHC RBC AUTO-ENTMCNC: 33.3 G/DL (ref 31.5–36.5)
MCV RBC AUTO: 88 FL (ref 78–100)
MONOCYTES # BLD AUTO: 0.8 10E3/UL (ref 0–1.3)
MONOCYTES NFR BLD AUTO: 7 %
NEUTROPHILS # BLD AUTO: 10 10E3/UL (ref 1.6–8.3)
NEUTROPHILS NFR BLD AUTO: 87 %
PLATELET # BLD AUTO: 253 10E3/UL (ref 150–450)
POTASSIUM BLD-SCNC: 4.1 MMOL/L (ref 3.4–5.3)
PROT SERPL-MCNC: 7 G/DL (ref 6.8–8.8)
RBC # BLD AUTO: 4.6 10E6/UL (ref 3.8–5.2)
SODIUM SERPL-SCNC: 132 MMOL/L (ref 135–145)
WBC # BLD AUTO: 11.5 10E3/UL (ref 4–11)

## 2025-04-24 NOTE — PATIENT INSTRUCTIONS
Dear Kandi Kim,    We are sorry you are not feeling well. Based on the responses you provided, it is recommended that you be seen in-person in urgent care so we can better evaluate your symptoms. Please click here to find the nearest urgent care location to you.   You will not be charged for this Visit. Thank you for trusting us with your care.    Rayne Galan, CNP

## 2025-04-24 NOTE — PROGRESS NOTES
"Assessment & Plan     Systemic antibiotic causing adverse effect in therapeutic use, initial encounter    Concern for C Diff  Stool culture pending  - C. difficile Toxin B PCR with reflex to C. difficile EIA  - C. difficile Toxin B PCR with reflex to C. difficile EIA    Diarrhea, unspecified type    Diarrhea is loose, watery stools (bowel movements). The exact cause is often hard to find. Sometimes diarrhea is your body's way of getting rid of what caused an upset stomach. Viruses, food poisoning, and many medicines can cause diarrhea. Some people get diarrhea in response to emotional stress, anxiety, or certain foods.  Almost everyone has diarrhea now and then. It usually isn't serious, and your stools will return to normal soon. The important thing to do is replace the fluids you have lost, so you can prevent dehydration.  The doctor has checked you carefully, but problems can develop later. If you notice any problems or new symptoms, get medical treatment right away.  Follow-up care is a key part of your treatment and safety.    CBC is mildly elevated  C-diff is pending  Stool Culture is pending     by ZAMZAM Stool  - CBC with platelets and differential    Stage 3 chronic kidney disease, unspecified whether stage 3a or 3b CKD (H)    Estimated Creatinine Clearance: 56.9 mL/min (based on SCr of 1 mg/dL).    - Comprehensive metabolic panel (BMP + Alb, Alk Phos, ALT, AST, Total. Bili, TP)  - Comprehensive metabolic panel (BMP + Alb, Alk Phos, ALT, AST, Total. Bili, TP)    Hyponatremia    Hyponatremia (say \"ny-td-[id number]-TREE-praful-uh\") means that you don't have enough sodium in your blood. It can cause nausea, vomiting, and headaches. Or you may not feel hungry. In serious cases, it can cause seizures, a coma, or even death.  Hyponatremia is not a disease. It is a problem caused by something else, such as medicines or exercising for a long time in hot weather.  You can get hyponatremia if you lose a lot of fluids and then you " "drink a lot of water or other liquids that don't have much sodium. You can also get it if you have kidney, liver, heart, or other health problems.  Treatment is focused on getting your sodium levels back to normal.  Follow-up care is a key part of your treatment and safety.       At today's visit with Kandi Kim , we discussed results, diagnosis, medications and formulated a plan.  We also discussed red flags for immediate return to clinic/ER, as well as indications for follow up with PCP if not improved in 3 days. Patient understood and agreed to plan. Kandi iKm was discharged with stable vitals and has no further questions.       No follow-ups on file.    Adams Richard, [facility], PA-C  [facility] U[facility] ROBERT Chau is a 69 year old female who presents to clinic today for the following health issues:  Chief Complaint   Patient presents with    Urgent Care     About 5-10 days ago started a stronger antibiotic and she's been having diarrhea, very loose stools, headaches          4/24/2025     5:08 PM   Additional Questions   Roomed by Ila Dixon   Accompanied by      HPI  Review of Systems  Constitutional, HEENT, cardiovascular, pulmonary, GI, , musculoskeletal, neuro, skin, endocrine and psych systems are negative, except as otherwise noted.      Objective    /77 (BP Location: Right arm, Patient Position: Sitting, Cuff Size: Adult Large)   Pulse 106   Temp 98.3  F (36.8  C) (Tympanic)   Resp 20   Ht 1.575 m (5' 2.01\")   Wt 89.9 kg (198 lb 4.8 oz)   LMP  (LMP Unknown)   SpO2 95%   BMI 36.26 kg/m    Physical Exam   GENERAL: alert and no distress  EYES: Eyes grossly normal to inspection, PERRL and conjunctivae and sclerae normal  HENT: ear canals and TM's normal, nose and mouth without ulcers or lesions  NECK: no adenopathy, no asymmetry, masses, or scars  RESP: lungs clear to auscultation - no rales, rhonchi or wheezes  CV: regular rate and rhythm, " normal S1 S2, no S3 or S4, no murmur, click or rub, no peripheral edema  ABDOMEN: soft, nontender, no hepatosplenomegaly, no masses and bowel sounds normal  MS: no gross musculoskeletal defects noted, no edema  SKIN: no suspicious lesions or rashes  NEURO: Normal strength and tone, mentation intact and speech normal  PSYCH: mentation appears normal, affect normal/bright      Results for orders placed or performed in visit on 04/24/25   Comprehensive metabolic panel (BMP + Alb, Alk Phos, ALT, AST, Total. Bili, TP)     Status: Abnormal   Result Value Ref Range    Sodium 132 (L) 135 - 145 mmol/L    Potassium 4.1 3.4 - 5.3 mmol/L    Chloride 110 (H) 94 - 109 mmol/L    Carbon Dioxide (CO2) 21 20 - 32 mmol/L    Anion Gap 1 (L) 3 - 14 mmol/L    Urea Nitrogen 14 7 - 30 mg/dL    Creatinine 1.00 0.52 - 1.04 mg/dL    GFR Estimate 61 >60 mL/min/1.73m2    Calcium 9.3 8.5 - 10.1 mg/dL    Glucose 138 (H) 70 - 99 mg/dL    Alkaline Phosphatase 100 40 - 150 U/L    AST 21 0 - 45 U/L    ALT 14 0 - 50 U/L    Protein Total 7.0 6.8 - 8.8 g/dL    Albumin 3.6 3.4 - 5.0 g/dL    Bilirubin Total 0.6 0.2 - 1.3 mg/dL   CBC with platelets and differential     Status: Abnormal   Result Value Ref Range    WBC Count 11.5 (H) 4.0 - 11.0 10e3/uL    RBC Count 4.60 3.80 - 5.20 10e6/uL    Hemoglobin 13.5 11.7 - 15.7 g/dL    Hematocrit 40.5 35.0 - 47.0 %    MCV 88 78 - 100 fL    MCH 29.3 26.5 - 33.0 pg    MCHC 33.3 31.5 - 36.5 g/dL    RDW 12.5 10.0 - 15.0 %    Platelet Count 253 150 - 450 10e3/uL    % Neutrophils 87 %    % Lymphocytes 6 %    % Monocytes 7 %    % Eosinophils 1 %    % Basophils 0 %    % Immature Granulocytes 0 %    Absolute Neutrophils 10.0 (H) 1.6 - 8.3 10e3/uL    Absolute Lymphocytes 0.7 (L) 0.8 - 5.3 10e3/uL    Absolute Monocytes 0.8 0.0 - 1.3 10e3/uL    Absolute Eosinophils 0.1 0.0 - 0.7 10e3/uL    Absolute Basophils 0.0 0.0 - 0.2 10e3/uL    Absolute Immature Granulocytes 0.0 <=0.4 10e3/uL   CBC with platelets and differential      Status: Abnormal    Narrative    The following orders were created for panel order CBC with platelets and differential.  Procedure                               Abnormality         Status                     ---------                               -----------         ------                     CBC with platelets and ...[[id number]]  Abnormal            Final result                 Please view results for these tests on the individual orders.

## 2025-04-24 NOTE — PROGRESS NOTES
Urgent Care Clinic Visit    Chief Complaint   Patient presents with    Urgent Care     About 5-10 days ago started a stronger antibiotic and she's been having diarrhea, very loose stools, headaches                4/24/2025     5:08 PM   Additional Questions   Roomed by Ila Dixon   Accompanied by

## 2025-04-26 LAB
ADV 40+41 DNA STL QL NAA+NON-PROBE: NEGATIVE
ASTRO TYP 1-8 RNA STL QL NAA+NON-PROBE: NEGATIVE
C CAYETANENSIS DNA STL QL NAA+NON-PROBE: NEGATIVE
CAMPYLOBACTER DNA SPEC NAA+PROBE: NEGATIVE
CRYPTOSP DNA STL QL NAA+NON-PROBE: NEGATIVE
E COLI O157 DNA STL QL NAA+NON-PROBE: NORMAL
E HISTOLYT DNA STL QL NAA+NON-PROBE: NEGATIVE
EAEC ASTA GENE ISLT QL NAA+PROBE: NEGATIVE
EC STX1+STX2 GENES STL QL NAA+NON-PROBE: NEGATIVE
EPEC EAE GENE STL QL NAA+NON-PROBE: NEGATIVE
ETEC LTA+ST1A+ST1B TOX ST NAA+NON-PROBE: NEGATIVE
G LAMBLIA DNA STL QL NAA+NON-PROBE: NEGATIVE
NOROVIRUS GI+II RNA STL QL NAA+NON-PROBE: NEGATIVE
P SHIGELLOIDES DNA STL QL NAA+NON-PROBE: NEGATIVE
RVA RNA STL QL NAA+NON-PROBE: NEGATIVE
SALMONELLA SP RPOD STL QL NAA+PROBE: NEGATIVE
SAPO I+II+IV+V RNA STL QL NAA+NON-PROBE: NEGATIVE
SHIGELLA SP+EIEC IPAH ST NAA+NON-PROBE: NEGATIVE
V CHOLERAE DNA SPEC QL NAA+PROBE: NEGATIVE
VIBRIO DNA SPEC NAA+PROBE: NEGATIVE
Y ENTEROCOL DNA STL QL NAA+PROBE: NEGATIVE

## 2025-05-21 ENCOUNTER — OFFICE VISIT (OUTPATIENT)
Dept: URGENT CARE | Facility: URGENT CARE | Age: 70
End: 2025-05-21
Payer: COMMERCIAL

## 2025-05-21 VITALS
BODY MASS INDEX: 36.39 KG/M2 | TEMPERATURE: 98.1 F | SYSTOLIC BLOOD PRESSURE: 99 MMHG | WEIGHT: 199 LBS | HEART RATE: 70 BPM | DIASTOLIC BLOOD PRESSURE: 67 MMHG | RESPIRATION RATE: 18 BRPM | OXYGEN SATURATION: 98 %

## 2025-05-21 DIAGNOSIS — R19.7 DIARRHEA, UNSPECIFIED TYPE: Primary | ICD-10-CM

## 2025-05-21 PROCEDURE — 99213 OFFICE O/P EST LOW 20 MIN: CPT | Performed by: PHYSICIAN ASSISTANT

## 2025-05-21 PROCEDURE — 3078F DIAST BP <80 MM HG: CPT | Performed by: PHYSICIAN ASSISTANT

## 2025-05-21 PROCEDURE — 3074F SYST BP LT 130 MM HG: CPT | Performed by: PHYSICIAN ASSISTANT

## 2025-05-21 NOTE — PROGRESS NOTES
Assessment & Plan     1. Diarrhea, unspecified type (Primary)  Patient with one day of profuse diarrhea along with stomach cramping similar to when she had C Diff about one month ago.   Stool testing ordered today - if C difficile is positive, treatment with fidaxomicin 200 mg orally twice daily for 10 days  - C. difficile Toxin B PCR with reflex to C. difficile EIA; Future  - C. difficile Toxin B PCR with reflex to C. difficile EIA      Diagnosis and treatment plan was reviewed with patient and/or family.   We went over any labs or imaging. Discussed worsening symptoms or little to no relief despite treatment plan to follow-up with PCP or return to clinic.  Patient verbalizes understanding. All questions were addressed and answered.     Brittany Santos PA-C  [facility] URGENT CARE [facility]    CHIEF COMPLAINT:   Chief Complaint   Patient presents with    Diarrhea     Diagnosed with c diff 4-24. Symptoms improved for a while and is now experiencing diarrhea, bloating. Denies nausea or vomiting.     Nhung Chau is a 69 year old female who presents to clinic today for evaluation of diarrhea.    She took a stool softener 4 days ago, as she was constipated. She was able to have a good bowel movement.      Last night started to have some diarrhea. This AM needed to go to the bathroom 7-10 times today. She is feeling bloating and cramping similar to when she had C Diff in the past (diagnosed on 4/25 with C diff and took a course of vancomycin) Prior to C difficile she was on cefdinir and augmentin.    No blood noted in the stool.     She has not had dysuria, hematuria or urinary frequency.       Past Medical History:   Diagnosis Date    Acute right-sided thoracic back pain 12/10/2024    Acute torn meniscus of knee, left, initial encounter 02/08/2018    Acute torn meniscus of knee, left, sequela 05/14/2019    Balance problems 12/01/2023    Bilateral hearing loss 03/26/2021    Hearing aids    Biliary  colic     Chest pain 03/14/2022    Choledocholithiasis 11/06/2024    Chronic foot pain, left 04/22/2024    Chronic left shoulder pain 05/03/2021    Chronic sinusitis     Class 2 severe obesity with serious comorbidity and body mass index (BMI) of 37.0 to 37.9 in adult (H) 07/21/2022    Depression     Diverticulitis 08/09/2016    CT scan normal    Epiploic appendagitis 01/25/2024    Gastroesophageal reflux disease     GERD with esophagitis     Complaints of dysphagia with EGD showing esophagitis.  Negative for eosinophilic esophagitis September 2019    History of colon polyps     Colonoscopy December 2018 with multiple polyps.  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  Added automatically from request for surgery [id number]    Influenza A 03/14/2022    LBBB (left bundle branch block) 11/06/2024    Normal echocardiogram and normal stress test    Left bundle branch block     Migraines     Mild depression 11/06/2024    Open-angle glaucoma of both eyes 07/21/2022    HONEY (obstructive sleep apnea)     Osteoarthritis     knees, hands    PONV (postoperative nausea and vomiting)     Postmenopausal symptoms     Primary osteoarthritis involving multiple joints 07/21/2022    Bilateral TKA and chronic pain involving left ankle and right shoulder    Primary osteoarthritis of both knees 03/26/2021    Bilateral TKA    Primary osteoarthritis of right knee 04/12/2016    R TKA    Rectal bleeding 08/09/2016    Rectocele 11/11/2016    Surgery is planned    Screening     DEXA normal 2016 and normal 2022    Situational anxiety     Status post total bilateral knee replacement 03/14/2022    Tear of right rotator cuff 06/22/2023    Shoulder surg 2023      Past Surgical History:   Procedure Laterality Date    ARTHRODESIS ANKLE Left 05/20/2024    Procedure: LEFT TALONAVICULAR FUSION;  Surgeon: Nila Tyler MD;  Location:  OR    ARTHROPLASTY KNEE Right 04/01/2016    ARTHROPLASTY KNEE Left     jan 2020    ARTHROSCOPY KNEE Bilateral 2011 2014    BUNIONECTOMY      BUNIONECTOMY Left 05/20/2024    Procedure: AND LEFT BUNION REPAIR;  Surgeon: Nila Tyler MD;  Location:  OR    CHOLANGIOGRAM N/A 09/26/2024    Procedure: CHOLANGIOGRAMS;  Surgeon: Syd Smith MD;  Location: [facility] OR    COLPORRHAPHY N/A 02/21/2017    Procedure: POSTERIOR COLPORRHAPHY;  Surgeon: Roxana Frias MD;  Location: [facility];  Service:     FOOT SURGERY Left     2024    HYSTERECTOMY VAGINAL N/A 02/21/2017    Procedure: TOTAL VAGINAL HYSTERECTOMY, BILATERAL SALPINGO-OOPHORECTOMY;  Surgeon: Roxana Frias MD;  Location: [facility];  Service:     LAPAROSCOPIC CHOLECYSTECTOMY N/A 09/26/2024    Procedure: CHOLECYSTECTOMY, LAPAROSCOPIC WITH;  Surgeon: Syd Smith MD;  Location: [facility] OR    MIDDLE EAR SURGERY      NEPHRECTOMY Left     Kidney donor    OOPHORECTOMY      RECESSION GASTROCNEMIUS Left 05/20/2024    Procedure: AND LEFT GASTROC SLIDE;  Surgeon: Nila Tyler MD;  Location:  OR    SHOULDER ARTHROSCOPY W/ ROTATOR CUFF REPAIR Right     TUBAL LIGATION       Social History     Tobacco Use    Smoking status: Never     Passive exposure: Never    Smokeless tobacco: Never   Substance Use Topics    Alcohol use: Yes     Comment: Alcoholic Drinks/day: infrequent     Current Outpatient Medications   Medication Sig Dispense Refill    acetaminophen (TYLENOL) 325 MG tablet Take 2 tablets (650 mg) by mouth every 4 hours as needed for mild pain 50 tablet 0    atorvastatin (LIPITOR) 20 MG tablet TAKE 1 TABLET BY MOUTH EVERY DAY 90 tablet 2    augmented betamethasone dipropionate (DIPROLENE AF) 0.05 % external cream 1 APPLICATION TWICE  A DAY TOPICALLY TO AFFECTED AREAS FOR UP TO 2 WEEKS.      benzonatate (TESSALON) 200 MG capsule Take 1 capsule (200 mg) by mouth 3 times daily as needed for cough. 30 capsule 0    citalopram (CELEXA) 20 MG tablet TAKE 1 TABLET BY MOUTH EVERY DAY 90 tablet 3    hydrOXYzine HCl (ATARAX) 25 MG tablet TAKE 1-2 TABLETS (25-50 MG) BY MOUTH EVERY 6 HOURS IF NEEDED FOR ITCHING.      latanoprost (XALATAN) 0.005 % ophthalmic solution Place 1 drop into both eyes every evening      levothyroxine (SYNTHROID/LEVOTHROID) 112 MCG tablet Take 1 tablet (112 mcg) by mouth daily. 90 tablet 3    lisinopril (ZESTRIL) 10 MG tablet Take 1 tablet (10 mg) by mouth daily. 90 tablet 3    omeprazole (PRILOSEC) 20 MG DR capsule Take 1 capsule (20 mg) by mouth 2 times daily. 180 capsule 3    Probiotic Product (FORTIFY PROBIOTIC WOMENS PO) Take 1 capsule by mouth daily.      amoxicillin (AMOXIL) 500 MG capsule TAKE 4 CAPSULES (2,000 MG) BY MOUTH ONCE FOR 1 DOSE PRIOR TO DENTIST VISITS 4 capsule 3     No current facility-administered medications for this visit.     Allergies   Allergen Reactions    Synvisc [Hylan G-F 20]        10 point ROS of systems were all negative except for pertinent positives noted in my HPI.      Exam:   BP 99/67   Pulse 70   Temp 98.1  F (36.7  C) (Oral)   Resp 18   Wt 90.3 kg (199 lb)   LMP  (LMP Unknown)   SpO2 98%   BMI 36.39 kg/m    Constitutional: healthy, alert and no distress  ENT: MMM. Throat clear.   Neck: neck is supple, no cervical lymphadenopathy or nuchal rigidity  Cardiovascular: RRR  Respiratory: CTA bilaterally, no rhonchi or rales  GI: Soft, non tender. Some discomfort in the lower abdomen B/L without rebound, guarding or rigidity  BACK: No CVAT B/L  Skin: no rashes  Neurologic: Speech clear, gait normal. Moves all extremities.    No results found for any visits on 05/21/25.

## 2025-05-21 NOTE — PROGRESS NOTES
Urgent Care Clinic Visit    Chief Complaint   Patient presents with    Diarrhea     Diagnosed with c diff 4-24. Symptoms improved for a while and is now experiencing diarrhea, bloating. Denies nausea or vomiting.               5/21/2025     2:34 PM   Additional Questions   Roomed by mey angela   Accompanied by self

## 2025-05-22 DIAGNOSIS — A04.72 C. DIFFICILE COLITIS: Primary | ICD-10-CM

## 2025-05-22 LAB
C DIFF GDH STL QL IA: POSITIVE
C DIFF TOX A+B STL QL IA: NEGATIVE
C DIFF TOX B STL QL: POSITIVE
LABORATORY COMMENT REPORT: ABNORMAL

## 2025-05-22 RX ORDER — VANCOMYCIN HYDROCHLORIDE 125 MG/1
CAPSULE ORAL
Qty: 70 CAPSULE | Refills: 0 | Status: SHIPPED | OUTPATIENT
Start: 2025-05-22 | End: 2025-06-21

## 2025-06-03 ENCOUNTER — TELEPHONE (OUTPATIENT)
Dept: INTERNAL MEDICINE | Facility: CLINIC | Age: 70
End: 2025-06-03

## 2025-06-03 ENCOUNTER — ANCILLARY PROCEDURE (OUTPATIENT)
Dept: GENERAL RADIOLOGY | Facility: CLINIC | Age: 70
End: 2025-06-03
Attending: INTERNAL MEDICINE
Payer: COMMERCIAL

## 2025-06-03 ENCOUNTER — OFFICE VISIT (OUTPATIENT)
Dept: INTERNAL MEDICINE | Facility: CLINIC | Age: 70
End: 2025-06-03
Payer: COMMERCIAL

## 2025-06-03 VITALS
SYSTOLIC BLOOD PRESSURE: 118 MMHG | HEART RATE: 72 BPM | OXYGEN SATURATION: 98 % | RESPIRATION RATE: 14 BRPM | HEIGHT: 60 IN | WEIGHT: 201 LBS | BODY MASS INDEX: 39.46 KG/M2 | DIASTOLIC BLOOD PRESSURE: 68 MMHG | TEMPERATURE: 98.2 F

## 2025-06-03 DIAGNOSIS — R05.1 ACUTE COUGH: Primary | ICD-10-CM

## 2025-06-03 DIAGNOSIS — I10 PRIMARY HYPERTENSION: ICD-10-CM

## 2025-06-03 DIAGNOSIS — R05.1 ACUTE COUGH: ICD-10-CM

## 2025-06-03 DIAGNOSIS — A04.72 C. DIFFICILE COLITIS: ICD-10-CM

## 2025-06-03 LAB
FLUAV RNA SPEC QL NAA+PROBE: NEGATIVE
FLUBV RNA RESP QL NAA+PROBE: NEGATIVE
RSV RNA SPEC NAA+PROBE: NEGATIVE
SARS-COV-2 RNA RESP QL NAA+PROBE: NEGATIVE

## 2025-06-03 PROCEDURE — G2211 COMPLEX E/M VISIT ADD ON: HCPCS | Performed by: INTERNAL MEDICINE

## 2025-06-03 PROCEDURE — 87637 SARSCOV2&INF A&B&RSV AMP PRB: CPT | Performed by: INTERNAL MEDICINE

## 2025-06-03 PROCEDURE — 3078F DIAST BP <80 MM HG: CPT | Performed by: INTERNAL MEDICINE

## 2025-06-03 PROCEDURE — 3074F SYST BP LT 130 MM HG: CPT | Performed by: INTERNAL MEDICINE

## 2025-06-03 PROCEDURE — 99213 OFFICE O/P EST LOW 20 MIN: CPT | Performed by: INTERNAL MEDICINE

## 2025-06-03 PROCEDURE — 71046 X-RAY EXAM CHEST 2 VIEWS: CPT | Mod: TC | Performed by: RADIOLOGY

## 2025-06-03 RX ORDER — ALBUTEROL SULFATE 90 UG/1
2 INHALANT RESPIRATORY (INHALATION) EVERY 6 HOURS PRN
Qty: 18 G | Refills: 1 | Status: SHIPPED | OUTPATIENT
Start: 2025-06-03

## 2025-06-03 NOTE — TELEPHONE ENCOUNTER
S-(situation): incoming call from patient. She said that she has been battling a cough and then got C-Diff infection.     B-(background):     Urgent care 5/21/25  1. Diarrhea, unspecified type (Primary)  Patient with one day of profuse diarrhea along with stomach cramping similar to when she had C Diff about one month ago.   Stool testing ordered today - if C difficile is positive, treatment with fidaxomicin 200 mg orally twice daily for 10 days  - C. difficile Toxin B PCR with reflex to C. difficile EIA; Future  - C. difficile Toxin B PCR with reflex to C. difficile EIA    A-(assessment): patient is feeling really lightheaded lately and having bad cough/congestion again.     R-(recommendations):  patient is wanting an appointment with Dr. Quezada to be checked out again, Do you want her to come into clinic or go back to urgent care?     Brittany BELTRAN RN

## 2025-06-03 NOTE — PROGRESS NOTES
Assessment & Plan     Acute cough  69-year-old woman here with acute cough lasting for over 1 week.  Denies feeling short of breath.  Associated with sore throat.  Generalized malaise.  No history of asthma or COPD.  Non-smoker.  No home testing for COVID completed.  No household contacts sick.  On exam oropharynx is normal and lungs are clear bilaterally without rales or wheezes.  Presumably viral URI.  I am checking for RSV, COVID and influenza with triple swab.  Will obtain chest x-ray to rule out pneumonia given persistence of symptoms.  I would try to avoid any antibiotics with her history of C. difficile colitis.  We discussed using OTC acetaminophen, Mucinex and Delsym cough syrup.  She may also benefit from trying an albuterol inhaler 2 puffs every 6 hours as needed and that was sent to her pharmacy.  - Influenza A/B, RSV and SARS-CoV2 PCR (COVID-19); Future  - XR Chest 2 Views; Future  - albuterol (PROAIR HFA/PROVENTIL HFA/VENTOLIN HFA) 108 (90 Base) MCG/ACT inhaler; Inhale 2 puffs into the lungs every 6 hours as needed for shortness of breath, wheezing or cough.    C. difficile colitis  Recent diagnosis of C. difficile colitis following treatment with antibiotics for respiratory infection.  Responded to vancomycin but symptoms recurred and stool rechecked and positive again for C. difficile.  She is now back on vancomycin having completed 10 days of 125 mg 4 times daily and we are slowly tapering her with a dose twice daily for 10 days followed by once daily for 10 days    Primary hypertension  Blood pressure looks well-controlled.  Continue current medication      The longitudinal plan of care for the diagnosis(es)/condition(s) as documented were addressed during this visit. Due to the added complexity in care, I will continue to support Kandi in the subsequent management and with ongoing continuity of care.     BMI  Estimated body mass index is 39.26 kg/m  as calculated from the following:    Height as  of this encounter: 1.524 m (5').    Weight as of this encounter: 91.2 kg (201 lb).         Follow-up  Return in about 6 months (around 12/3/2025) for follow-up for previously scheduled visit.    Nhung Chau is a 69 year old, presenting for the following health issues: Persistent cough with sore throat and recent C. difficile.  See assessment and plan for details  URI (Cough production yellow/green, congestion, lightheaded, ST, worsening in the last week)      6/3/2025     1:46 PM   Additional Questions   Roomed by      URI    History of Present Illness       Reason for visit:  Followup    She eats 0-1 servings of fruits and vegetables daily.She consumes 1 sweetened beverage(s) daily. She exercises with enough effort to increase her heart rate 3 or less days per week.   She is taking medications regularly.                  Review of Systems  Constitutional, HEENT, cardiovascular, pulmonary, gi and gu systems are negative, except as otherwise noted.      Objective    /68 (BP Location: Right arm, Patient Position: Sitting, Cuff Size: Adult Regular)   Pulse 72   Temp 98.2  F (36.8  C) (Oral)   Resp 14   Ht 1.524 m (5')   Wt 91.2 kg (201 lb)   LMP  (LMP Unknown)   SpO2 98%   Breastfeeding No   BMI 39.26 kg/m    Body mass index is 39.26 kg/m .  Physical Exam     Well-appearing middle-aged woman who does not appear to be acutely short of breath with O2 sats 98% on room air  HEENT normal.  Normal TMs.  Normal oropharynx  No cervical lymphadenopathy  Lungs clear bilaterally without rales or wheezes  Heart regular rate and rhythm          Signed Electronically by: Ho Quezada MD

## 2025-06-03 NOTE — PATIENT INSTRUCTIONS
Continue using over-the-counter Tylenol (acetaminophen, Mucinex and Delsym cough syrup to manage your symptoms.  You can also try using the albuterol inhaler 2 puffs every 6 hours as needed if this provides some relief.

## 2025-06-05 ENCOUNTER — RESULTS FOLLOW-UP (OUTPATIENT)
Dept: INTERNAL MEDICINE | Facility: CLINIC | Age: 70
End: 2025-06-05

## 2025-06-18 DIAGNOSIS — A04.72 C. DIFFICILE COLITIS: ICD-10-CM

## 2025-06-18 RX ORDER — VANCOMYCIN HYDROCHLORIDE 125 MG/1
CAPSULE ORAL
Qty: 70 CAPSULE | Refills: 0 | OUTPATIENT
Start: 2025-06-18 | End: 2025-07-18

## 2025-06-18 NOTE — TELEPHONE ENCOUNTER
Based on Dr. Pereira's last note, I was under the impression that we were weaning off of the vancomycin?  Can we call the patient and clarify?

## 2025-06-18 NOTE — TELEPHONE ENCOUNTER
Outgoing call to patient. She said she weaning off of medication and hoping she isn't going to need it anymore. Refused refill request at this time.      Brittany BELTRAN RN

## 2025-06-24 ENCOUNTER — ANCILLARY PROCEDURE (OUTPATIENT)
Dept: MAMMOGRAPHY | Facility: CLINIC | Age: 70
End: 2025-06-24
Attending: INTERNAL MEDICINE
Payer: COMMERCIAL

## 2025-06-24 DIAGNOSIS — Z12.31 VISIT FOR SCREENING MAMMOGRAM: ICD-10-CM

## 2025-06-24 PROCEDURE — 77063 BREAST TOMOSYNTHESIS BI: CPT | Mod: TC | Performed by: RADIOLOGY

## 2025-06-24 PROCEDURE — 77067 SCR MAMMO BI INCL CAD: CPT | Mod: TC | Performed by: RADIOLOGY

## 2025-07-08 ENCOUNTER — TRANSFERRED RECORDS (OUTPATIENT)
Dept: HEALTH INFORMATION MANAGEMENT | Facility: CLINIC | Age: 70
End: 2025-07-08
Payer: COMMERCIAL

## 2025-07-18 ENCOUNTER — TRANSFERRED RECORDS (OUTPATIENT)
Dept: HEALTH INFORMATION MANAGEMENT | Facility: CLINIC | Age: 70
End: 2025-07-18
Payer: COMMERCIAL

## 2025-07-22 ENCOUNTER — TRANSFERRED RECORDS (OUTPATIENT)
Dept: HEALTH INFORMATION MANAGEMENT | Facility: CLINIC | Age: 70
End: 2025-07-22
Payer: COMMERCIAL

## 2025-08-23 DIAGNOSIS — E03.9 ACQUIRED HYPOTHYROIDISM: ICD-10-CM

## 2025-08-23 DIAGNOSIS — I10 PRIMARY HYPERTENSION: ICD-10-CM

## 2025-08-23 DIAGNOSIS — A04.72 C. DIFFICILE COLITIS: ICD-10-CM

## 2025-08-23 RX ORDER — VANCOMYCIN HYDROCHLORIDE 125 MG/1
CAPSULE ORAL
Qty: 70 CAPSULE | Refills: 0 | OUTPATIENT
Start: 2025-08-23 | End: 2025-09-22

## 2025-08-23 RX ORDER — LEVOTHYROXINE SODIUM 112 UG/1
112 TABLET ORAL DAILY
Qty: 90 TABLET | Refills: 1 | Status: SHIPPED | OUTPATIENT
Start: 2025-08-23

## 2025-08-23 RX ORDER — LISINOPRIL 10 MG/1
10 TABLET ORAL DAILY
Qty: 90 TABLET | Refills: 1 | Status: SHIPPED | OUTPATIENT
Start: 2025-08-23

## 2025-08-26 ENCOUNTER — DOCUMENTATION ONLY (OUTPATIENT)
Dept: OTHER | Facility: CLINIC | Age: 70
End: 2025-08-26
Payer: COMMERCIAL

## 2025-09-02 ENCOUNTER — TRANSFERRED RECORDS (OUTPATIENT)
Dept: HEALTH INFORMATION MANAGEMENT | Facility: CLINIC | Age: 70
End: 2025-09-02
Payer: COMMERCIAL

## (undated) DEVICE — GLOVE UNDER INDICATOR PI SZ 7.0 LF 41670

## (undated) DEVICE — ENDO TROCAR FIRST ENTRY KII FIOS Z-THRD 05X100MM CTF03

## (undated) DEVICE — CLIP APPLIER ENDO ROTATING 10MM MED/LG ER320

## (undated) DEVICE — GOWN XXLG REINFORCED 9071EL

## (undated) DEVICE — DRAPE C-ARM 60X42" 1013

## (undated) DEVICE — SOL WATER IRRIG 1000ML BOTTLE 2F7114

## (undated) DEVICE — TUBING SMOKE EVAC PNEUMOCLEAR HIGH FLOW 0620050250

## (undated) DEVICE — STOPCOCK 3 WAY 500 PSI M3SNC

## (undated) DEVICE — IMM LIMB ELEVATOR DC40-0203

## (undated) DEVICE — SOL RINGERS LACTATED 1000ML BAG 2B2324X

## (undated) DEVICE — Device

## (undated) DEVICE — CAST PADDING 4" UNSTERILE 9044

## (undated) DEVICE — TUBING IV EXTENSION SET ANESTHESIA 34" MLL 2C6227

## (undated) DEVICE — BNDG ELASTIC 4" DBL LENGTH UNSTERILE 6611-14

## (undated) DEVICE — CATH CHOLANGIOGRAM 4.5FR TAUT METAL TIP 20018-M55

## (undated) DEVICE — SU VICRYL 0 CT-1 27" J340H

## (undated) DEVICE — SOL NACL 0.9% IRRIG 1000ML BOTTLE 2F7124

## (undated) DEVICE — SU ETHILON 3-0 FS-1 18" 669H

## (undated) DEVICE — GOWN LG DISP 9515

## (undated) DEVICE — DRILL BIT ARTHREX MTP 2.0MM AR-8944-22

## (undated) DEVICE — GUIDE WIRE ARTHREX 0.94"X8" AR-8967K

## (undated) DEVICE — ENDO TROCAR FIRST ENTRY KII FIOS Z-THRD 11X100MM CTF33

## (undated) DEVICE — CATH IV 14GA 2IN REM FLASHPLUG DEHP-FR PVC FR 4251717-02

## (undated) DEVICE — PREP CHLORAPREP 26ML TINTED HI-LITE ORANGE 930815

## (undated) DEVICE — ESU GROUND PAD ADULT REM W/15' CORD E7507DB

## (undated) DEVICE — BNDG ELASTIC 4"X5YDS UNSTERILE 6611-40

## (undated) DEVICE — DECANTER BAG 2002S

## (undated) DEVICE — CAST PADDING 4" STERILE 9044S

## (undated) DEVICE — SU MONOCRYL+ 4-0 18IN PS2 UND MCP496G

## (undated) DEVICE — LINEN TOWEL PACK X5 5464

## (undated) DEVICE — ENDO TROCAR SLEEVE KII Z-THREADED 05X100MM CTS02

## (undated) DEVICE — BLADE KNIFE SURG 15 371115

## (undated) DEVICE — DRSG ABDOMINAL 07 1/2X8" 7197D

## (undated) DEVICE — ESU CORD MONOPOLAR 10'  E0510

## (undated) DEVICE — DRAPE SHEET REV FOLD 3/4 9349

## (undated) DEVICE — BLADE SAW OSCILLATING STRYK MED 9.0X25X0.38MM 2296-003-111

## (undated) DEVICE — SYR 30ML LL W/O NDL 302832

## (undated) DEVICE — NDL INSUFFLATION 13GA 120MM C2201

## (undated) DEVICE — SU VICRYL 3-0 PS-1 18" UND J683

## (undated) DEVICE — CUSTOM PACK LAP CHOLE SBA5BLCHEA

## (undated) DEVICE — PACK EXTREMITY SOP15EXFSD

## (undated) DEVICE — SUCTION STRYKERFLOW II 250-070-500

## (undated) DEVICE — ENDO SHEARS RENEW LAP ENDOCUT SCISSOR TIP 16.5MM 3142

## (undated) DEVICE — CAST FIBERGLASS 3" ROLL WHITE 82003

## (undated) DEVICE — GLOVE PI ULTRATCH M LF SZ 6.5 PF CUFF TEXT STRL LF 42665

## (undated) DEVICE — DECANTER VIAL 2006S

## (undated) DEVICE — STPL SKIN 35W ROTATING HEAD PRW35

## (undated) DEVICE — SUCTION MANIFOLD NEPTUNE 2 SYS 1 PORT 702-025-000

## (undated) DEVICE — CAST PLASTER SPLINT 5X30" 7395

## (undated) DEVICE — CAST FIBERGLASS 4" ROLL WHITE 82004

## (undated) RX ORDER — SCOLOPAMINE TRANSDERMAL SYSTEM 1 MG/1
PATCH, EXTENDED RELEASE TRANSDERMAL
Status: DISPENSED
Start: 2024-05-20

## (undated) RX ORDER — FENTANYL CITRATE 0.05 MG/ML
INJECTION, SOLUTION INTRAMUSCULAR; INTRAVENOUS
Status: DISPENSED
Start: 2024-05-20

## (undated) RX ORDER — ONDANSETRON 2 MG/ML
INJECTION INTRAMUSCULAR; INTRAVENOUS
Status: DISPENSED
Start: 2024-05-20

## (undated) RX ORDER — CEFAZOLIN SODIUM/WATER 2 G/20 ML
SYRINGE (ML) INTRAVENOUS
Status: DISPENSED
Start: 2024-05-20

## (undated) RX ORDER — PROPOFOL 10 MG/ML
INJECTION, EMULSION INTRAVENOUS
Status: DISPENSED
Start: 2024-05-20

## (undated) RX ORDER — OXYCODONE HYDROCHLORIDE 5 MG/1
TABLET ORAL
Status: DISPENSED
Start: 2024-05-20

## (undated) RX ORDER — DEXAMETHASONE SODIUM PHOSPHATE 4 MG/ML
INJECTION, SOLUTION INTRA-ARTICULAR; INTRALESIONAL; INTRAMUSCULAR; INTRAVENOUS; SOFT TISSUE
Status: DISPENSED
Start: 2024-05-20

## (undated) RX ORDER — BUPIVACAINE HYDROCHLORIDE 2.5 MG/ML
INJECTION, SOLUTION EPIDURAL; INFILTRATION; INTRACAUDAL
Status: DISPENSED
Start: 2024-09-26

## (undated) RX ORDER — FENTANYL CITRATE 50 UG/ML
INJECTION, SOLUTION INTRAMUSCULAR; INTRAVENOUS
Status: DISPENSED
Start: 2024-05-20

## (undated) RX ORDER — FENTANYL CITRATE 50 UG/ML
INJECTION, SOLUTION INTRAMUSCULAR; INTRAVENOUS
Status: DISPENSED
Start: 2024-09-26